# Patient Record
Sex: FEMALE | Race: WHITE | NOT HISPANIC OR LATINO
[De-identification: names, ages, dates, MRNs, and addresses within clinical notes are randomized per-mention and may not be internally consistent; named-entity substitution may affect disease eponyms.]

---

## 2017-09-05 ENCOUNTER — APPOINTMENT (OUTPATIENT)
Dept: MRI IMAGING | Facility: CLINIC | Age: 47
End: 2017-09-05
Payer: COMMERCIAL

## 2017-09-05 ENCOUNTER — OUTPATIENT (OUTPATIENT)
Dept: OUTPATIENT SERVICES | Facility: HOSPITAL | Age: 47
LOS: 1 days | End: 2017-09-05
Payer: COMMERCIAL

## 2017-09-05 ENCOUNTER — APPOINTMENT (OUTPATIENT)
Dept: RADIOLOGY | Facility: CLINIC | Age: 47
End: 2017-09-05
Payer: COMMERCIAL

## 2017-09-05 DIAGNOSIS — Z00.8 ENCOUNTER FOR OTHER GENERAL EXAMINATION: ICD-10-CM

## 2017-09-05 PROCEDURE — 27093 INJECTION FOR HIP X-RAY: CPT | Mod: LT

## 2017-09-05 PROCEDURE — 77002 NEEDLE LOCALIZATION BY XRAY: CPT | Mod: 26

## 2017-09-05 PROCEDURE — 73722 MRI JOINT OF LWR EXTR W/DYE: CPT | Mod: 26,LT

## 2017-09-05 PROCEDURE — 73722 MRI JOINT OF LWR EXTR W/DYE: CPT

## 2017-09-05 PROCEDURE — 27093 INJECTION FOR HIP X-RAY: CPT

## 2017-09-05 PROCEDURE — 77002 NEEDLE LOCALIZATION BY XRAY: CPT

## 2017-09-11 DIAGNOSIS — M76.02 GLUTEAL TENDINITIS, LEFT HIP: ICD-10-CM

## 2017-09-11 DIAGNOSIS — M25.852 OTHER SPECIFIED JOINT DISORDERS, LEFT HIP: ICD-10-CM

## 2017-09-11 DIAGNOSIS — M25.552 PAIN IN LEFT HIP: ICD-10-CM

## 2018-01-11 ENCOUNTER — APPOINTMENT (OUTPATIENT)
Dept: PULMONOLOGY | Facility: CLINIC | Age: 48
End: 2018-01-11
Payer: COMMERCIAL

## 2018-01-11 ENCOUNTER — LABORATORY RESULT (OUTPATIENT)
Age: 48
End: 2018-01-11

## 2018-01-11 VITALS
HEART RATE: 78 BPM | SYSTOLIC BLOOD PRESSURE: 124 MMHG | DIASTOLIC BLOOD PRESSURE: 80 MMHG | HEIGHT: 64 IN | BODY MASS INDEX: 25.27 KG/M2 | WEIGHT: 148 LBS | OXYGEN SATURATION: 98 %

## 2018-01-11 DIAGNOSIS — Z87.891 PERSONAL HISTORY OF NICOTINE DEPENDENCE: ICD-10-CM

## 2018-01-11 DIAGNOSIS — Z82.61 FAMILY HISTORY OF ARTHRITIS: ICD-10-CM

## 2018-01-11 DIAGNOSIS — Z82.49 FAMILY HISTORY OF ISCHEMIC HEART DISEASE AND OTHER DISEASES OF THE CIRCULATORY SYSTEM: ICD-10-CM

## 2018-01-11 DIAGNOSIS — Z86.39 PERSONAL HISTORY OF OTHER ENDOCRINE, NUTRITIONAL AND METABOLIC DISEASE: ICD-10-CM

## 2018-01-11 DIAGNOSIS — Z78.9 OTHER SPECIFIED HEALTH STATUS: ICD-10-CM

## 2018-01-11 DIAGNOSIS — Z87.01 PERSONAL HISTORY OF PNEUMONIA (RECURRENT): ICD-10-CM

## 2018-01-11 PROCEDURE — 71046 X-RAY EXAM CHEST 2 VIEWS: CPT

## 2018-01-11 PROCEDURE — 94729 DIFFUSING CAPACITY: CPT

## 2018-01-11 PROCEDURE — 94618 PULMONARY STRESS TESTING: CPT

## 2018-01-11 PROCEDURE — 99205 OFFICE O/P NEW HI 60 MIN: CPT | Mod: 25

## 2018-01-11 PROCEDURE — 94010 BREATHING CAPACITY TEST: CPT | Mod: 59

## 2018-01-11 RX ORDER — LINACLOTIDE 290 UG/1
290 CAPSULE, GELATIN COATED ORAL
Qty: 90 | Refills: 0 | Status: ACTIVE | COMMUNITY
Start: 2017-09-21

## 2018-01-11 RX ORDER — TEMAZEPAM 30 MG/1
30 CAPSULE ORAL
Qty: 30 | Refills: 0 | Status: ACTIVE | COMMUNITY
Start: 2017-11-08

## 2018-01-13 LAB
CLAM IGE QN: 0.14 KUA/L
CODFISH IGE QN: <0.1 KUA/L
CORN IGE QN: <0.1 KUA/L
COW MILK IGE QN: <0.1 KUA/L
DEPRECATED CLAM IGE RAST QL: NORMAL
DEPRECATED CODFISH IGE RAST QL: 0
DEPRECATED CORN IGE RAST QL: 0
DEPRECATED COW MILK IGE RAST QL: 0
DEPRECATED EGG WHITE IGE RAST QL: 0
DEPRECATED PEANUT IGE RAST QL: 0
DEPRECATED SCALLOP IGE RAST QL: 0.32 KUA/L
DEPRECATED SESAME SEED IGE RAST QL: 0
DEPRECATED SHRIMP IGE RAST QL: ABNORMAL
DEPRECATED SOYBEAN IGE RAST QL: 0
DEPRECATED WALNUT IGE RAST QL: 0
DEPRECATED WHEAT IGE RAST QL: 0
EGG WHITE IGE QN: <0.1 KUA/L
PEANUT IGE QN: <0.1 KUA/L
SCALLOP IGE QN: 1.78 KUA/L
SCALLOP IGE QN: NORMAL
SESAME SEED IGE QN: <0.1 KUA/L
SOYBEAN IGE QN: <0.1 KUA/L
WALNUT IGE QN: <0.1 KUA/L
WHEAT IGE QN: <0.1 KUA/L

## 2018-01-16 ENCOUNTER — FORM ENCOUNTER (OUTPATIENT)
Age: 48
End: 2018-01-16

## 2018-01-16 LAB
A ALTERNATA IGE QN: <0.1 KUA/L
A FUMIGATUS IGE QN: <0.1 KUA/L
C ALBICANS IGE QN: <0.1 KUA/L
C HERBARUM IGE QN: <0.1 KUA/L
CAT DANDER IGE QN: <0.1 KUA/L
CHLAMYDIA PNEUMONIAE AB IGA: NORMAL TITER
CHLAMYDIA PNEUMONIAE AB IGG: NORMAL TITER
CHLAMYDIA PNEUMONIAE AB IGM: NORMAL TITER
COMMON RAGWEED IGE QN: <0.1 KUA/L
D FARINAE IGE QN: 1.56 KUA/L
D PTERONYSS IGE QN: 1.87 KUA/L
DEPRECATED A ALTERNATA IGE RAST QL: 0
DEPRECATED A FUMIGATUS IGE RAST QL: 0
DEPRECATED C ALBICANS IGE RAST QL: 0
DEPRECATED C HERBARUM IGE RAST QL: 0
DEPRECATED CAT DANDER IGE RAST QL: 0
DEPRECATED COMMON RAGWEED IGE RAST QL: 0
DEPRECATED D FARINAE IGE RAST QL: ABNORMAL
DEPRECATED D PTERONYSS IGE RAST QL: ABNORMAL
DEPRECATED DOG DANDER IGE RAST QL: 0
DEPRECATED M RACEMOSUS IGE RAST QL: 0
DEPRECATED ROACH IGE RAST QL: ABNORMAL
DEPRECATED TIMOTHY IGE RAST QL: 0
DEPRECATED WHITE OAK IGE RAST QL: 0
DOG DANDER IGE QN: <0.1 KUA/L
M RACEMOSUS IGE QN: <0.1 KUA/L
ROACH IGE QN: 3.6 KUA/L
TIMOTHY IGE QN: <0.1 KUA/L
WHITE OAK IGE QN: <0.1 KUA/L

## 2018-01-17 ENCOUNTER — OUTPATIENT (OUTPATIENT)
Dept: OUTPATIENT SERVICES | Facility: HOSPITAL | Age: 48
LOS: 1 days | End: 2018-01-17
Payer: COMMERCIAL

## 2018-01-17 ENCOUNTER — APPOINTMENT (OUTPATIENT)
Dept: CT IMAGING | Facility: IMAGING CENTER | Age: 48
End: 2018-01-17

## 2018-01-17 DIAGNOSIS — J39.8 OTHER SPECIFIED DISEASES OF UPPER RESPIRATORY TRACT: ICD-10-CM

## 2018-01-17 PROCEDURE — 71250 CT THORAX DX C-: CPT | Mod: 26

## 2018-01-17 PROCEDURE — 71250 CT THORAX DX C-: CPT

## 2018-01-30 PROBLEM — J06.9 ACUTE URI: Status: RESOLVED | Noted: 2018-01-23 | Resolved: 2018-01-30

## 2018-01-30 RX ORDER — AZITHROMYCIN 500 MG/1
500 TABLET, FILM COATED ORAL DAILY
Qty: 7 | Refills: 0 | Status: DISCONTINUED | COMMUNITY
Start: 2018-01-17 | End: 2018-01-30

## 2018-01-30 RX ORDER — OXYCODONE AND ACETAMINOPHEN 5; 325 MG/1; MG/1
5-325 TABLET ORAL
Qty: 30 | Refills: 0 | Status: DISCONTINUED | COMMUNITY
Start: 2017-10-16 | End: 2018-01-30

## 2018-01-30 RX ORDER — AZITHROMYCIN 250 MG/1
250 TABLET, FILM COATED ORAL
Qty: 6 | Refills: 0 | Status: DISCONTINUED | COMMUNITY
Start: 2017-12-27 | End: 2018-01-30

## 2018-01-30 RX ORDER — DOXYCYCLINE 100 MG/1
100 CAPSULE ORAL
Qty: 20 | Refills: 0 | Status: DISCONTINUED | COMMUNITY
Start: 2017-11-16 | End: 2018-01-30

## 2018-02-01 ENCOUNTER — APPOINTMENT (OUTPATIENT)
Dept: THORACIC SURGERY | Facility: CLINIC | Age: 48
End: 2018-02-01
Payer: COMMERCIAL

## 2018-02-01 VITALS
RESPIRATION RATE: 18 BRPM | TEMPERATURE: 96.9 F | DIASTOLIC BLOOD PRESSURE: 70 MMHG | OXYGEN SATURATION: 97 % | SYSTOLIC BLOOD PRESSURE: 120 MMHG | HEART RATE: 77 BPM

## 2018-02-01 DIAGNOSIS — J06.9 ACUTE UPPER RESPIRATORY INFECTION, UNSPECIFIED: ICD-10-CM

## 2018-02-01 PROCEDURE — 99205 OFFICE O/P NEW HI 60 MIN: CPT

## 2018-02-01 RX ORDER — OMEPRAZOLE 40 MG/1
40 CAPSULE, DELAYED RELEASE ORAL
Qty: 30 | Refills: 0 | Status: COMPLETED | COMMUNITY
Start: 2017-10-16 | End: 2018-02-01

## 2018-02-01 RX ORDER — CELECOXIB 200 MG/1
200 CAPSULE ORAL
Qty: 30 | Refills: 0 | Status: COMPLETED | COMMUNITY
Start: 2017-10-16 | End: 2018-02-01

## 2018-02-01 RX ORDER — GUAIFENESIN AND CODEINE PHOSPHATE 10; 100 MG/5ML; MG/5ML
100-10 SOLUTION ORAL
Qty: 180 | Refills: 0 | Status: COMPLETED | COMMUNITY
Start: 2017-11-16 | End: 2018-02-01

## 2018-02-01 RX ORDER — PREDNISONE 10 MG/1
10 TABLET ORAL
Qty: 1 | Refills: 0 | Status: COMPLETED | COMMUNITY
Start: 2018-01-23 | End: 2018-02-01

## 2018-02-01 RX ORDER — ALPRAZOLAM 0.5 MG/1
0.5 TABLET ORAL
Qty: 60 | Refills: 0 | Status: COMPLETED | COMMUNITY
Start: 2017-11-08 | End: 2018-02-01

## 2018-02-01 RX ORDER — PREDNISONE 20 MG/1
20 TABLET ORAL
Qty: 7 | Refills: 0 | Status: COMPLETED | COMMUNITY
Start: 2017-12-22 | End: 2018-02-01

## 2018-02-01 RX ORDER — BECLOMETHASONE DIPROPIONATE 80 UG/1
80 AEROSOL, METERED RESPIRATORY (INHALATION) TWICE DAILY
Qty: 3 | Refills: 1 | Status: COMPLETED | COMMUNITY
Start: 2018-01-11 | End: 2018-02-01

## 2018-02-01 RX ORDER — DOXYCYCLINE 100 MG/1
100 TABLET, FILM COATED ORAL
Qty: 1 | Refills: 0 | Status: COMPLETED | COMMUNITY
Start: 2018-01-23 | End: 2018-02-01

## 2018-02-01 RX ORDER — VALACYCLOVIR 1 G/1
1 TABLET, FILM COATED ORAL
Qty: 21 | Refills: 0 | Status: COMPLETED | COMMUNITY
Start: 2017-07-21 | End: 2018-02-01

## 2018-02-01 RX ORDER — FLUTICASONE PROPIONATE 50 UG/1
50 SPRAY, METERED NASAL
Qty: 16 | Refills: 0 | Status: COMPLETED | COMMUNITY
Start: 2017-11-16 | End: 2018-02-01

## 2018-02-01 RX ORDER — TRAMADOL HYDROCHLORIDE 50 MG/1
50 TABLET, COATED ORAL
Qty: 40 | Refills: 0 | Status: COMPLETED | COMMUNITY
Start: 2017-07-27 | End: 2018-02-01

## 2018-02-06 VITALS
OXYGEN SATURATION: 100 % | WEIGHT: 179.24 LBS | DIASTOLIC BLOOD PRESSURE: 66 MMHG | SYSTOLIC BLOOD PRESSURE: 117 MMHG | HEIGHT: 64 IN | HEART RATE: 70 BPM | RESPIRATION RATE: 16 BRPM | TEMPERATURE: 97 F

## 2018-02-06 NOTE — ASU PATIENT PROFILE, ADULT - PSH
H/O arthroscopy of right knee    H/O bilateral breast reduction surgery    History of abdominoplasty    History of carpal tunnel release  left  History of cataract surgery  bilateral  History of parotidectomy  left  Status post arthroscopy of hip  left H/O arthroscopy of right knee    H/O bilateral breast reduction surgery    H/O tubal ligation    History of abdominoplasty    History of bladder surgery  sling  History of carpal tunnel release  left  History of cataract surgery  bilateral  History of lipoma  removed from left shoulder  History of parotidectomy  left  Status post arthroscopy of hip  left

## 2018-02-07 ENCOUNTER — APPOINTMENT (OUTPATIENT)
Dept: THORACIC SURGERY | Facility: HOSPITAL | Age: 48
End: 2018-02-07

## 2018-02-07 ENCOUNTER — OUTPATIENT (OUTPATIENT)
Dept: OUTPATIENT SERVICES | Facility: HOSPITAL | Age: 48
LOS: 1 days | Discharge: HOME | End: 2018-02-07
Payer: COMMERCIAL

## 2018-02-07 VITALS
OXYGEN SATURATION: 98 % | RESPIRATION RATE: 26 BRPM | DIASTOLIC BLOOD PRESSURE: 61 MMHG | HEART RATE: 72 BPM | TEMPERATURE: 99 F | SYSTOLIC BLOOD PRESSURE: 107 MMHG

## 2018-02-07 DIAGNOSIS — Z86.018 PERSONAL HISTORY OF OTHER BENIGN NEOPLASM: Chronic | ICD-10-CM

## 2018-02-07 DIAGNOSIS — Z98.890 OTHER SPECIFIED POSTPROCEDURAL STATES: Chronic | ICD-10-CM

## 2018-02-07 DIAGNOSIS — Z98.49 CATARACT EXTRACTION STATUS, UNSPECIFIED EYE: Chronic | ICD-10-CM

## 2018-02-07 DIAGNOSIS — Z98.51 TUBAL LIGATION STATUS: Chronic | ICD-10-CM

## 2018-02-07 PROCEDURE — 86901 BLOOD TYPING SEROLOGIC RH(D): CPT

## 2018-02-07 PROCEDURE — 86850 RBC ANTIBODY SCREEN: CPT

## 2018-02-07 PROCEDURE — 31622 DX BRONCHOSCOPE/WASH: CPT

## 2018-02-07 PROCEDURE — 86900 BLOOD TYPING SEROLOGIC ABO: CPT

## 2018-02-07 RX ORDER — SODIUM CHLORIDE 9 MG/ML
300 INJECTION INTRAMUSCULAR; INTRAVENOUS; SUBCUTANEOUS ONCE
Qty: 0 | Refills: 0 | Status: DISCONTINUED | OUTPATIENT
Start: 2018-02-07 | End: 2018-02-07

## 2018-02-17 PROBLEM — K58.9 IRRITABLE BOWEL SYNDROME, UNSPECIFIED: Chronic | Status: ACTIVE | Noted: 2018-02-06

## 2018-02-17 PROBLEM — E03.9 HYPOTHYROIDISM, UNSPECIFIED: Chronic | Status: ACTIVE | Noted: 2018-02-06

## 2018-02-17 PROBLEM — K58.9 IRRITABLE BOWEL SYNDROME WITHOUT DIARRHEA: Chronic | Status: ACTIVE | Noted: 2018-02-06

## 2018-02-17 PROBLEM — F41.9 ANXIETY DISORDER, UNSPECIFIED: Chronic | Status: ACTIVE | Noted: 2018-02-06

## 2018-02-21 ENCOUNTER — APPOINTMENT (OUTPATIENT)
Dept: PULMONOLOGY | Facility: CLINIC | Age: 48
End: 2018-02-21

## 2018-03-01 ENCOUNTER — LABORATORY RESULT (OUTPATIENT)
Age: 48
End: 2018-03-01

## 2018-03-01 ENCOUNTER — APPOINTMENT (OUTPATIENT)
Dept: THORACIC SURGERY | Facility: CLINIC | Age: 48
End: 2018-03-01
Payer: COMMERCIAL

## 2018-03-01 VITALS
SYSTOLIC BLOOD PRESSURE: 142 MMHG | TEMPERATURE: 97.7 F | HEART RATE: 71 BPM | RESPIRATION RATE: 18 BRPM | OXYGEN SATURATION: 98 % | DIASTOLIC BLOOD PRESSURE: 68 MMHG

## 2018-03-01 PROCEDURE — 99214 OFFICE O/P EST MOD 30 MIN: CPT

## 2018-03-01 RX ORDER — ACLIDINIUM BROMIDE 400 UG/1
400 POWDER, METERED RESPIRATORY (INHALATION) TWICE DAILY
Qty: 3 | Refills: 1 | Status: COMPLETED | COMMUNITY
Start: 2018-01-17 | End: 2018-03-01

## 2018-03-01 RX ORDER — DESLORATADINE 5 MG/1
5 TABLET, FILM COATED ORAL
Qty: 1 | Refills: 1 | Status: COMPLETED | COMMUNITY
Start: 2018-01-17 | End: 2018-03-01

## 2018-03-01 RX ORDER — GLYCOPYRROLATE AND FORMOTEROL FUMARATE 9; 4.8 UG/1; UG/1
9-4.8 AEROSOL, METERED RESPIRATORY (INHALATION)
Qty: 3 | Refills: 1 | Status: COMPLETED | COMMUNITY
Start: 2018-01-11 | End: 2018-03-01

## 2018-03-01 RX ORDER — IBUPROFEN AND FAMOTIDINE 800; 26.6 MG/1; MG/1
800-26.6 TABLET, COATED ORAL
Qty: 90 | Refills: 0 | Status: COMPLETED | COMMUNITY
Start: 2017-11-06 | End: 2018-03-01

## 2018-03-01 RX ORDER — AMITRIPTYLINE HYDROCHLORIDE 10 MG/1
10 TABLET, FILM COATED ORAL
Qty: 90 | Refills: 1 | Status: COMPLETED | COMMUNITY
Start: 2018-01-17 | End: 2018-03-01

## 2018-03-01 RX ORDER — INHALER, ASSIST DEVICES
SPACER (EA) MISCELLANEOUS
Qty: 1 | Refills: 2 | Status: COMPLETED | COMMUNITY
Start: 2018-01-11 | End: 2018-03-01

## 2018-03-01 RX ORDER — AZELASTINE HYDROCHLORIDE 137 UG/1
0.1 SPRAY, METERED NASAL TWICE DAILY
Qty: 1 | Refills: 0 | Status: COMPLETED | COMMUNITY
Start: 2018-01-23 | End: 2018-03-01

## 2018-03-01 RX ORDER — AMITRIPTYLINE HYDROCHLORIDE 10 MG/1
10 TABLET, FILM COATED ORAL
Qty: 120 | Refills: 3 | Status: COMPLETED | COMMUNITY
Start: 2018-02-07 | End: 2018-03-01

## 2018-03-01 RX ORDER — ERGOCALCIFEROL 1.25 MG/1
1.25 MG CAPSULE, LIQUID FILLED ORAL
Qty: 6 | Refills: 2 | Status: COMPLETED | COMMUNITY
Start: 2018-01-11 | End: 2018-03-01

## 2018-03-09 RX ORDER — FLUTICASONE PROPIONATE 220 MCG
1 AEROSOL WITH ADAPTER (GRAM) INHALATION
Qty: 0 | Refills: 0 | COMMUNITY

## 2018-03-09 NOTE — PATIENT PROFILE ADULT. - PSH
H/O arthroscopy of right knee    H/O bilateral breast reduction surgery    H/O tubal ligation    History of abdominoplasty    History of bladder surgery  sling  History of carpal tunnel release  left  History of cataract surgery  bilateral  History of lipoma  removed from left shoulder  History of parotidectomy  left  Status post arthroscopy of hip  left

## 2018-03-12 ENCOUNTER — APPOINTMENT (OUTPATIENT)
Dept: THORACIC SURGERY | Facility: HOSPITAL | Age: 48
End: 2018-03-12
Payer: COMMERCIAL

## 2018-03-12 ENCOUNTER — RESULT REVIEW (OUTPATIENT)
Age: 48
End: 2018-03-12

## 2018-03-12 ENCOUNTER — INPATIENT (INPATIENT)
Facility: HOSPITAL | Age: 48
LOS: 2 days | Discharge: HOME HEALTH | DRG: 165 | End: 2018-03-15
Attending: SPECIALIST | Admitting: SPECIALIST
Payer: COMMERCIAL

## 2018-03-12 VITALS — RESPIRATION RATE: 15 BRPM | HEART RATE: 92 BPM | OXYGEN SATURATION: 100 % | TEMPERATURE: 98 F

## 2018-03-12 DIAGNOSIS — Z86.018 PERSONAL HISTORY OF OTHER BENIGN NEOPLASM: Chronic | ICD-10-CM

## 2018-03-12 DIAGNOSIS — Z98.890 OTHER SPECIFIED POSTPROCEDURAL STATES: Chronic | ICD-10-CM

## 2018-03-12 DIAGNOSIS — Z98.49 CATARACT EXTRACTION STATUS, UNSPECIFIED EYE: Chronic | ICD-10-CM

## 2018-03-12 DIAGNOSIS — Z98.51 TUBAL LIGATION STATUS: Chronic | ICD-10-CM

## 2018-03-12 LAB
ANION GAP SERPL CALC-SCNC: 14 MMOL/L — SIGNIFICANT CHANGE UP (ref 5–17)
APTT BLD: 25.3 SEC — LOW (ref 27.5–37.4)
APTT BLD: 28.6 SEC — SIGNIFICANT CHANGE UP (ref 27.5–37.4)
BLD GP AB SCN SERPL QL: NEGATIVE — SIGNIFICANT CHANGE UP
BUN SERPL-MCNC: 16 MG/DL — SIGNIFICANT CHANGE UP (ref 7–23)
CALCIUM SERPL-MCNC: 8.7 MG/DL — SIGNIFICANT CHANGE UP (ref 8.4–10.5)
CHLORIDE SERPL-SCNC: 99 MMOL/L — SIGNIFICANT CHANGE UP (ref 96–108)
CO2 SERPL-SCNC: 22 MMOL/L — SIGNIFICANT CHANGE UP (ref 22–31)
CREAT SERPL-MCNC: 0.74 MG/DL — SIGNIFICANT CHANGE UP (ref 0.5–1.3)
GAS PNL BLDA: SIGNIFICANT CHANGE UP
GAS PNL BLDA: SIGNIFICANT CHANGE UP
GLUCOSE SERPL-MCNC: 150 MG/DL — HIGH (ref 70–99)
HCT VFR BLD CALC: 35.3 % — SIGNIFICANT CHANGE UP (ref 34.5–45)
HCT VFR BLD CALC: 41 % — SIGNIFICANT CHANGE UP (ref 34.5–45)
HGB BLD-MCNC: 12 G/DL — SIGNIFICANT CHANGE UP (ref 11.5–15.5)
HGB BLD-MCNC: 14 G/DL — SIGNIFICANT CHANGE UP (ref 11.5–15.5)
INR BLD: 1.01 — SIGNIFICANT CHANGE UP (ref 0.88–1.16)
INR BLD: 1.08 — SIGNIFICANT CHANGE UP (ref 0.88–1.16)
LACTATE SERPL-SCNC: 1 MMOL/L — SIGNIFICANT CHANGE UP (ref 0.5–2)
LYMPHOCYTES # BLD AUTO: 5 % — LOW (ref 13–44)
MAGNESIUM SERPL-MCNC: 2.1 MG/DL — SIGNIFICANT CHANGE UP (ref 1.6–2.6)
MCHC RBC-ENTMCNC: 29.6 PG — SIGNIFICANT CHANGE UP (ref 27–34)
MCHC RBC-ENTMCNC: 30.4 PG — SIGNIFICANT CHANGE UP (ref 27–34)
MCHC RBC-ENTMCNC: 34 G/DL — SIGNIFICANT CHANGE UP (ref 32–36)
MCHC RBC-ENTMCNC: 34.1 G/DL — SIGNIFICANT CHANGE UP (ref 32–36)
MCV RBC AUTO: 87.2 FL — SIGNIFICANT CHANGE UP (ref 80–100)
MCV RBC AUTO: 88.9 FL — SIGNIFICANT CHANGE UP (ref 80–100)
NEUTROPHILS NFR BLD AUTO: 95 % — HIGH (ref 43–77)
PHOSPHATE SERPL-MCNC: 4.2 MG/DL — SIGNIFICANT CHANGE UP (ref 2.5–4.5)
PLATELET # BLD AUTO: 258 K/UL — SIGNIFICANT CHANGE UP (ref 150–400)
PLATELET # BLD AUTO: 263 K/UL — SIGNIFICANT CHANGE UP (ref 150–400)
POTASSIUM SERPL-MCNC: 4.7 MMOL/L — SIGNIFICANT CHANGE UP (ref 3.5–5.3)
POTASSIUM SERPL-SCNC: 4.7 MMOL/L — SIGNIFICANT CHANGE UP (ref 3.5–5.3)
PROTHROM AB SERPL-ACNC: 11.2 SEC — SIGNIFICANT CHANGE UP (ref 9.8–12.7)
PROTHROM AB SERPL-ACNC: 12 SEC — SIGNIFICANT CHANGE UP (ref 9.8–12.7)
RBC # BLD: 4.05 M/UL — SIGNIFICANT CHANGE UP (ref 3.8–5.2)
RBC # BLD: 4.61 M/UL — SIGNIFICANT CHANGE UP (ref 3.8–5.2)
RBC # FLD: 12.6 % — SIGNIFICANT CHANGE UP (ref 10.3–16.9)
RBC # FLD: 13.3 % — SIGNIFICANT CHANGE UP (ref 10.3–16.9)
RH IG SCN BLD-IMP: POSITIVE — SIGNIFICANT CHANGE UP
SODIUM SERPL-SCNC: 135 MMOL/L — SIGNIFICANT CHANGE UP (ref 135–145)
WBC # BLD: 14.7 K/UL — HIGH (ref 3.8–10.5)
WBC # BLD: 20.3 K/UL — HIGH (ref 3.8–10.5)
WBC # FLD AUTO: 14.7 K/UL — HIGH (ref 3.8–10.5)
WBC # FLD AUTO: 20.3 K/UL — HIGH (ref 3.8–10.5)

## 2018-03-12 PROCEDURE — 31760 TRACHEOPLASTY INTRATHORACIC: CPT | Mod: 80

## 2018-03-12 PROCEDURE — 31770 REPAIR/GRAFT OF BRONCHUS: CPT

## 2018-03-12 PROCEDURE — 31622 DX BRONCHOSCOPE/WASH: CPT

## 2018-03-12 PROCEDURE — 71045 X-RAY EXAM CHEST 1 VIEW: CPT | Mod: 26,76

## 2018-03-12 PROCEDURE — S2900 ROBOTIC SURGICAL SYSTEM: CPT | Mod: NC

## 2018-03-12 PROCEDURE — 93010 ELECTROCARDIOGRAM REPORT: CPT

## 2018-03-12 PROCEDURE — 32674 THORACOSCOPY LYMPH NODE EXC: CPT

## 2018-03-12 PROCEDURE — 31770 REPAIR/GRAFT OF BRONCHUS: CPT | Mod: 80

## 2018-03-12 PROCEDURE — 32674 THORACOSCOPY LYMPH NODE EXC: CPT | Mod: 80

## 2018-03-12 PROCEDURE — 31760 TRACHEOPLASTY INTRATHORACIC: CPT

## 2018-03-12 RX ORDER — KETOROLAC TROMETHAMINE 30 MG/ML
15 SYRINGE (ML) INJECTION ONCE
Qty: 0 | Refills: 0 | Status: DISCONTINUED | OUTPATIENT
Start: 2018-03-12 | End: 2018-03-12

## 2018-03-12 RX ORDER — DEXTROSE 50 % IN WATER 50 %
25 SYRINGE (ML) INTRAVENOUS
Qty: 0 | Refills: 0 | Status: DISCONTINUED | OUTPATIENT
Start: 2018-03-12 | End: 2018-03-13

## 2018-03-12 RX ORDER — SODIUM CHLORIDE 9 MG/ML
1000 INJECTION, SOLUTION INTRAVENOUS
Qty: 0 | Refills: 0 | Status: DISCONTINUED | OUTPATIENT
Start: 2018-03-12 | End: 2018-03-13

## 2018-03-12 RX ORDER — FENTANYL CITRATE 50 UG/ML
25 INJECTION INTRAVENOUS
Qty: 0 | Refills: 0 | Status: DISCONTINUED | OUTPATIENT
Start: 2018-03-12 | End: 2018-03-13

## 2018-03-12 RX ORDER — FENTANYL CITRATE 50 UG/ML
12.5 INJECTION INTRAVENOUS ONCE
Qty: 0 | Refills: 0 | Status: DISCONTINUED | OUTPATIENT
Start: 2018-03-12 | End: 2018-03-12

## 2018-03-12 RX ORDER — MORPHINE SULFATE 50 MG/1
4 CAPSULE, EXTENDED RELEASE ORAL ONCE
Qty: 0 | Refills: 0 | Status: DISCONTINUED | OUTPATIENT
Start: 2018-03-12 | End: 2018-03-12

## 2018-03-12 RX ORDER — IPRATROPIUM BROMIDE 0.2 MG/ML
500 SOLUTION, NON-ORAL INHALATION ONCE
Qty: 0 | Refills: 0 | Status: COMPLETED | OUTPATIENT
Start: 2018-03-12 | End: 2018-03-13

## 2018-03-12 RX ORDER — ALBUMIN HUMAN 25 %
250 VIAL (ML) INTRAVENOUS ONCE
Qty: 0 | Refills: 0 | Status: COMPLETED | OUTPATIENT
Start: 2018-03-12 | End: 2018-03-13

## 2018-03-12 RX ORDER — INSULIN HUMAN 100 [IU]/ML
2 INJECTION, SOLUTION SUBCUTANEOUS
Qty: 50 | Refills: 0 | Status: DISCONTINUED | OUTPATIENT
Start: 2018-03-12 | End: 2018-03-13

## 2018-03-12 RX ORDER — HEPARIN SODIUM 5000 [USP'U]/ML
5000 INJECTION INTRAVENOUS; SUBCUTANEOUS EVERY 8 HOURS
Qty: 0 | Refills: 0 | Status: DISCONTINUED | OUTPATIENT
Start: 2018-03-12 | End: 2018-03-15

## 2018-03-12 RX ORDER — DEXMEDETOMIDINE HYDROCHLORIDE IN 0.9% SODIUM CHLORIDE 4 UG/ML
0.3 INJECTION INTRAVENOUS
Qty: 200 | Refills: 0 | Status: DISCONTINUED | OUTPATIENT
Start: 2018-03-12 | End: 2018-03-13

## 2018-03-12 RX ORDER — LEVOTHYROXINE SODIUM 125 MCG
12.5 TABLET ORAL AT BEDTIME
Qty: 0 | Refills: 0 | Status: DISCONTINUED | OUTPATIENT
Start: 2018-03-12 | End: 2018-03-12

## 2018-03-12 RX ORDER — BUPIVACAINE 13.3 MG/ML
20 INJECTION, SUSPENSION, LIPOSOMAL INFILTRATION ONCE
Qty: 0 | Refills: 0 | Status: DISCONTINUED | OUTPATIENT
Start: 2018-03-12 | End: 2018-03-13

## 2018-03-12 RX ORDER — HYDROMORPHONE HYDROCHLORIDE 2 MG/ML
0.5 INJECTION INTRAMUSCULAR; INTRAVENOUS; SUBCUTANEOUS ONCE
Qty: 0 | Refills: 0 | Status: DISCONTINUED | OUTPATIENT
Start: 2018-03-12 | End: 2018-03-12

## 2018-03-12 RX ORDER — LEVOTHYROXINE SODIUM 125 MCG
12.5 TABLET ORAL AT BEDTIME
Qty: 0 | Refills: 0 | Status: DISCONTINUED | OUTPATIENT
Start: 2018-03-12 | End: 2018-03-13

## 2018-03-12 RX ORDER — DEXTROSE 50 % IN WATER 50 %
50 SYRINGE (ML) INTRAVENOUS
Qty: 0 | Refills: 0 | Status: DISCONTINUED | OUTPATIENT
Start: 2018-03-12 | End: 2018-03-13

## 2018-03-12 RX ORDER — VANCOMYCIN HCL 1 G
1000 VIAL (EA) INTRAVENOUS EVERY 12 HOURS
Qty: 0 | Refills: 0 | Status: COMPLETED | OUTPATIENT
Start: 2018-03-12 | End: 2018-03-13

## 2018-03-12 RX ORDER — PANTOPRAZOLE SODIUM 20 MG/1
40 TABLET, DELAYED RELEASE ORAL DAILY
Qty: 0 | Refills: 0 | Status: DISCONTINUED | OUTPATIENT
Start: 2018-03-12 | End: 2018-03-13

## 2018-03-12 RX ADMIN — Medication 1200 MILLIGRAM(S): at 22:36

## 2018-03-12 RX ADMIN — HYDROMORPHONE HYDROCHLORIDE 0.5 MILLIGRAM(S): 2 INJECTION INTRAMUSCULAR; INTRAVENOUS; SUBCUTANEOUS at 20:15

## 2018-03-12 RX ADMIN — FENTANYL CITRATE 25 MICROGRAM(S): 50 INJECTION INTRAVENOUS at 15:35

## 2018-03-12 RX ADMIN — HEPARIN SODIUM 5000 UNIT(S): 5000 INJECTION INTRAVENOUS; SUBCUTANEOUS at 22:45

## 2018-03-12 RX ADMIN — Medication 15 MILLIGRAM(S): at 17:09

## 2018-03-12 RX ADMIN — Medication 15 MILLIGRAM(S): at 17:30

## 2018-03-12 RX ADMIN — HYDROMORPHONE HYDROCHLORIDE 0.5 MILLIGRAM(S): 2 INJECTION INTRAMUSCULAR; INTRAVENOUS; SUBCUTANEOUS at 19:45

## 2018-03-12 RX ADMIN — FENTANYL CITRATE 25 MICROGRAM(S): 50 INJECTION INTRAVENOUS at 15:05

## 2018-03-12 RX ADMIN — HEPARIN SODIUM 5000 UNIT(S): 5000 INJECTION INTRAVENOUS; SUBCUTANEOUS at 16:09

## 2018-03-12 RX ADMIN — Medication 250 MILLIGRAM(S): at 22:36

## 2018-03-12 RX ADMIN — FENTANYL CITRATE 12.5 MICROGRAM(S): 50 INJECTION INTRAVENOUS at 14:15

## 2018-03-12 RX ADMIN — FENTANYL CITRATE 12.5 MICROGRAM(S): 50 INJECTION INTRAVENOUS at 14:00

## 2018-03-12 NOTE — PROGRESS NOTE ADULT - ASSESSMENT
Pt is a 46 yo F with h/o pertussis, cough, SOB worked up with the following  1) PFT showed FEV1= 2.48, 93 % of predicted value, FVC =2.76, 86% of predicted value, PEF= 5.33, 89% of predicted value and DLCO -23.3, 108 % of predicted value.   2) CT chest on 1/17/18 revealed: 70% narrowing of trachea on the expiratory phase, which is consistent with tracheomalacia, as well as a 3 mm RLL nodule noted.   3) Bronchoscopy 2/7/18 demonstrated that there was a 75% distral tracheal collapse on forced expiration, 90 % collapse of the left main bronchus, and 75% collapse of the right main bronchus with force exhalation. There was no significant collapse of the trachea with quiet breathing.   Pt dx'd with tracheomalacia and today is s/p R VATS, robotic assisted tracheobronchoblasty with prolene mesh and placement of R CT. EBL 50ml Intra-op fluids 700ml Crystalloids UO 200ml    Resp: R CT removed/ Supplemental O2 prn  ID: Vanco prophylaxis  CVS:  HEME: Heparin sq  FEN: NPO  Endo: Adjust Insulin drip to Glu/ Cont Synthroid  Renal: Follow BUN/Cr and UO  Pain management prn/ OOB->chair    CCT: 40 min

## 2018-03-12 NOTE — H&P ADULT - HISTORY OF PRESENT ILLNESS
47 year old female with PMH of pertussis, cough, SOB, originally referred by Dr. Jack Summers, presents for surgical correction of tracheomalacia.     Patient chief complaint is a dry non productive cough and repeated pulmonary infections which started approximately 2 years ago. She has been evaluated by an ENT for post nasal drip and GERD, and she was told she had neither. She was diagnosed with pertussis 1 year ago, which was treated with antibiotics. She feels her cough has progressively worsened in the past few months and she is now experiencing SOB on exertion.     PFT showed FEV1= 2.48, 93 % of predicted value, FVC =2.76, 86% of predicted value, PEF= 5.33, 89% of predicted value and DLCO -23.3, 108 % of predicted value.     CT chest on 1/17/18 revealed: 70% narrowing of trachea on the expiratory phase, which is consistent with tracheomalacia, as well as a 3 mm RLL nodule noted.     Bronchoscopy 2/7/18 demonstrated that there was a 75% distral tracheal collapse on forced expiration, 90 % collapse of the left main bronchus, and 75% collapse of 47 year old female with PMH of pertussis, cough, SOB, originally referred by Dr. Jack Summers, presents for surgical correction of tracheomalacia.     Patient chief complaint is a dry non productive cough and repeated pulmonary infections which started approximately 2 years ago. She has been evaluated by an ENT for post nasal drip and GERD, and she was told she had neither. She was diagnosed with pertussis 1 year ago, which was treated with antibiotics. She feels her cough has progressively worsened in the past few months and she is now experiencing SOB on exertion.     PFT showed FEV1= 2.48, 93 % of predicted value, FVC =2.76, 86% of predicted value, PEF= 5.33, 89% of predicted value and DLCO -23.3, 108 % of predicted value.     CT chest on 1/17/18 revealed: 70% narrowing of trachea on the expiratory phase, which is consistent with tracheomalacia, as well as a 3 mm RLL nodule noted.     Bronchoscopy 2/7/18 demonstrated that there was a 75% distral tracheal collapse on forced expiration, 90 % collapse of the left main bronchus, and 75% collapse of the right main bronchus with force exhalation. There was no significant collapse of the trachea with quiet breathing. She was given a trial of Elavil for the cough and is on 30 mg nightly.     She presents today for surgical repair as her cough and shortness of breath are affecting her quality of life. She reports shortness of breath and cough on minimal exertion. Patient denies hemoptysis, nausea, vomiting, diarrhea, chest pain, fever, or chills.

## 2018-03-12 NOTE — BRIEF OPERATIVE NOTE - PROCEDURE
<<-----Click on this checkbox to enter Procedure VATS (video-assisted thoracoscopic surgery)  03/12/2018  right, robotic assisted, Tracheobronchoblasty with prolene mesh  Active  PMAINGON VATS (video-assisted thoracoscopic surgery)  03/12/2018  right, robotic assisted, Tracheobronchoblasty with prolene mesh  Active  Emma Rosario

## 2018-03-12 NOTE — H&P ADULT - NSHPPHYSICALEXAM_GEN_ALL_CORE
Eyes: the sclera and conjunctiva were normal and pupils were equal in size, round, and reactive to light  Neck: appearance of neck was normal and supple  Pulm: no resp distress, CTABL  Heart: RRR, no m/r/g  Abdomen: +BS, soft non tender  Vasc: Carotid, radials 2 + bilaterally  MS: normal gait  Skin: normal skin color, pigmentation, turgor. no rashes or lesions  Neurological: non focal, alert and oriented x 3

## 2018-03-12 NOTE — H&P ADULT - NSHPREVIEWOFSYSTEMS_GEN_ALL_CORE
Constitutional: as noted in HPI  Respiratory: as noted in HPI  Eyes, ENT, Cardiovascular, GI, , Musculoskeletal, Integumentary, Neurological, Psychiatric, Endocrine, and Heme/Lymph are otherwise negative.

## 2018-03-12 NOTE — PROGRESS NOTE ADULT - SUBJECTIVE AND OBJECTIVE BOX
HPI:  Pt is a 46 yo F with h/o pertussis, cough, SOB worked up with the following  1) PFT showed FEV1= 2.48, 93 % of predicted value, FVC =2.76, 86% of predicted value, PEF= 5.33, 89% of predicted value and DLCO -23.3, 108 % of predicted value.   2) CT chest on 1/17/18 revealed: 70% narrowing of trachea on the expiratory phase, which is consistent with tracheomalacia, as well as a 3 mm RLL nodule noted.   3) Bronchoscopy 2/7/18 demonstrated that there was a 75% distral tracheal collapse on forced expiration, 90 % collapse of the left main bronchus, and 75% collapse of the right main bronchus with force exhalation. There was no significant collapse of the trachea with quiet breathing.   Pt dx'd with tracheomalacia and today is s/p R VATS robotic assisted tracheobronchoblasty with prolene mesh and placement of R CT. EBL 50ml Intra-op fluids 700ml Crystalloids UO 200ml      24 hr events:      ## Labs:  CBC:                        14.0   20.3  )-----------( 258      ( 12 Mar 2018 14:11 )             41.0     Chem:  03-12    135  |  99  |  16  ----------------------------<  150<H>  4.7   |  22  |  0.74    Ca    8.7      12 Mar 2018 14:11  Phos  4.2     03-12  Mg     2.1     03-12      Coags:  PT/INR - ( 12 Mar 2018 14:11 )   PT: 11.2 sec;   INR: 1.01          PTT - ( 12 Mar 2018 14:11 )  PTT:25.3 sec        ## Imaging:    ## Medications:  vancomycin  IVPB 1000 milliGRAM(s) IV Intermittent every 12 hours        dextrose 50% Injectable 50 milliLiter(s) IV Push every 15 minutes  dextrose 50% Injectable 25 milliLiter(s) IV Push every 15 minutes  insulin Infusion 2 Unit(s)/Hr IV Continuous <Continuous>  levothyroxine Injectable 12.5 MICROGram(s) IV Push at bedtime    heparin  Injectable 5000 Unit(s) SubCutaneous every 8 hours    pantoprazole  Injectable 40 milliGRAM(s) IV Push daily    fentaNYL    Injectable 25 MICROGram(s) IV Push every 3 hours PRN      ## Vitals:  T(C): 36.7 (03-12-18 @ 17:14), Max: 36.7 (03-12-18 @ 17:14)  HR: 82 (03-12-18 @ 17:00) (82 - 92)  BP: --  BP(mean): --  RR: 20 (03-12-18 @ 17:00) (12 - 21)  SpO2: 98% (03-12-18 @ 17:00) (96% - 100%)  Wt(kg): --  Vent:   ABG: ABG - ( 12 Mar 2018 14:10 )  pH: 7.37  /  pCO2: 40    /  pO2: 102   / HCO3: 23    / Base Excess: -2.1  /  SaO2: 98                        ## P/E:  Gen: lying comfortably in bed in no apparent distress  Mouth:   Neck:  Lungs:   Heart:   Abd:  Ext:  Neuro:    CENTRAL LINE: [ ] YES [ ] NO  LOCATION:   DATE INSERTED:  REMOVE: [ ] YES [ ] NO      MELVIN: [ ] YES [ ] NO    DATE INSERTED:  REMOVE:  [ ] YES [ ] NO      A-LINE:  [ ] YES [ ] NO  LOCATION:   DATE INSERTED:  REMOVE:  [ ] YES [ ] NO  EXPLAIN:    GLOBAL ISSUE/BEST PRACTICE:  Analgesia:  Sedation:  HOB elevation: yes  Stress ulcer prophylaxis:  VTE prophylaxis:  Oral Care:  Glycemic control:  Nutrition:    CODE STATUS: [ ] full code  [ ] DNR  [ ] DNI  [ ] MOLST  Goals of care discussion: [ ] yes HPI:  Pt is a 46 yo F with h/o pertussis, cough, SOB worked up with the following  1) PFT showed FEV1= 2.48, 93 % of predicted value, FVC =2.76, 86% of predicted value, PEF= 5.33, 89% of predicted value and DLCO -23.3, 108 % of predicted value.   2) CT chest on 1/17/18 revealed: 70% narrowing of trachea on the expiratory phase, which is consistent with tracheomalacia, as well as a 3 mm RLL nodule noted.   3) Bronchoscopy 2/7/18 demonstrated that there was a 75% distral tracheal collapse on forced expiration, 90 % collapse of the left main bronchus, and 75% collapse of the right main bronchus with force exhalation. There was no significant collapse of the trachea with quiet breathing.   Pt dx'd with tracheomalacia and today is s/p R VATS, robotic assisted tracheobronchoblasty with prolene mesh and placement of R CT. EBL 50ml Intra-op fluids 700ml Crystalloids UO 200ml    ## Labs:  CBC:                        14.0   20.3  )-----------( 258      ( 12 Mar 2018 14:11 )             41.0     Chem:  03-12    135  |  99  |  16  ----------------------------<  150<H>  4.7   |  22  |  0.74    Ca    8.7      12 Mar 2018 14:11  Phos  4.2     03-12  Mg     2.1     03-12      Coags:  PT/INR - ( 12 Mar 2018 14:11 )   PT: 11.2 sec;   INR: 1.01        PTT - ( 12 Mar 2018 14:11 )  PTT:25.3 sec      ## Imaging:    ## Medications:  vancomycin  IVPB 1000 milliGRAM(s) IV Intermittent every 12 hours      dextrose 50% Injectable 50 milliLiter(s) IV Push every 15 minutes  dextrose 50% Injectable 25 milliLiter(s) IV Push every 15 minutes  insulin Infusion 2 Unit(s)/Hr IV Continuous <Continuous>  levothyroxine Injectable 12.5 MICROGram(s) IV Push at bedtime    heparin  Injectable 5000 Unit(s) SubCutaneous every 8 hours    pantoprazole  Injectable 40 milliGRAM(s) IV Push daily    fentaNYL    Injectable 25 MICROGram(s) IV Push every 3 hours PRN      ## Vitals:  T(C): 36.7 (03-12-18 @ 17:14), Max: 36.7 (03-12-18 @ 17:14)  HR: 82 (03-12-18 @ 17:00) (82 - 92)  BP: --  BP(mean): --  RR: 20 (03-12-18 @ 17:00) (12 - 21)  SpO2: 98% (03-12-18 @ 17:00) (96% - 100%)  Wt(kg): --  Vent:   ABG: ABG - ( 12 Mar 2018 14:10 )  pH: 7.37  /  pCO2: 40    /  pO2: 102   / HCO3: 23    / Base Excess: -2.1  /  SaO2: 98          ## P/E:  Gen: lying c/o pain  Lungs: CTA/ R CT  Heart: RRR  Abd: Soft/+BS  Ext: No edema  Neuro: Awake/alert    CENTRAL LINE: [ ] YES [ ] NO  LOCATION:   DATE INSERTED:  REMOVE: [ ] YES [ ] NO      MELVIN: [ ] YES [ ] NO    DATE INSERTED:  REMOVE:  [ ] YES [ ] NO      A-LINE:  [x ] YES [ ] NO  LOCATION:   DATE INSERTED:  REMOVE:  [ ] YES [ ] NO  EXPLAIN:    CODE STATUS: [x ] full code  [ ] DNR  [ ] DNI  [ ] [facility]  Goals of care discussion: [ ] yes

## 2018-03-12 NOTE — H&P ADULT - ASSESSMENT
47 year old female with PMH of pertussis, cough, SOB, originally referred by Dr. Jack Summers, presents for surgical correction of tracheomalacia.     -NPO since midnight  -Consent signed and in the chart  -Will send type and screen, blood on hold for the OR.

## 2018-03-13 LAB
ALBUMIN SERPL ELPH-MCNC: 3.5 G/DL — SIGNIFICANT CHANGE UP (ref 3.3–5)
ALP SERPL-CCNC: 41 U/L — SIGNIFICANT CHANGE UP (ref 40–120)
ALT FLD-CCNC: 19 U/L — SIGNIFICANT CHANGE UP (ref 10–45)
ANION GAP SERPL CALC-SCNC: 12 MMOL/L — SIGNIFICANT CHANGE UP (ref 5–17)
ANION GAP SERPL CALC-SCNC: 13 MMOL/L — SIGNIFICANT CHANGE UP (ref 5–17)
APTT BLD: 29.5 SEC — SIGNIFICANT CHANGE UP (ref 27.5–37.4)
AST SERPL-CCNC: 23 U/L — SIGNIFICANT CHANGE UP (ref 10–40)
BILIRUB SERPL-MCNC: 1.2 MG/DL — SIGNIFICANT CHANGE UP (ref 0.2–1.2)
BUN SERPL-MCNC: 17 MG/DL — SIGNIFICANT CHANGE UP (ref 7–23)
BUN SERPL-MCNC: 18 MG/DL — SIGNIFICANT CHANGE UP (ref 7–23)
CALCIUM SERPL-MCNC: 8.2 MG/DL — LOW (ref 8.4–10.5)
CALCIUM SERPL-MCNC: 8.3 MG/DL — LOW (ref 8.4–10.5)
CHLORIDE SERPL-SCNC: 100 MMOL/L — SIGNIFICANT CHANGE UP (ref 96–108)
CHLORIDE SERPL-SCNC: 98 MMOL/L — SIGNIFICANT CHANGE UP (ref 96–108)
CO2 SERPL-SCNC: 23 MMOL/L — SIGNIFICANT CHANGE UP (ref 22–31)
CO2 SERPL-SCNC: 25 MMOL/L — SIGNIFICANT CHANGE UP (ref 22–31)
CREAT SERPL-MCNC: 0.71 MG/DL — SIGNIFICANT CHANGE UP (ref 0.5–1.3)
CREAT SERPL-MCNC: 0.72 MG/DL — SIGNIFICANT CHANGE UP (ref 0.5–1.3)
GLUCOSE SERPL-MCNC: 106 MG/DL — HIGH (ref 70–99)
GLUCOSE SERPL-MCNC: 136 MG/DL — HIGH (ref 70–99)
HCT VFR BLD CALC: 35.5 % — SIGNIFICANT CHANGE UP (ref 34.5–45)
HGB BLD-MCNC: 11.7 G/DL — SIGNIFICANT CHANGE UP (ref 11.5–15.5)
INR BLD: 0.98 — SIGNIFICANT CHANGE UP (ref 0.88–1.16)
MAGNESIUM SERPL-MCNC: 2 MG/DL — SIGNIFICANT CHANGE UP (ref 1.6–2.6)
MCHC RBC-ENTMCNC: 29.4 PG — SIGNIFICANT CHANGE UP (ref 27–34)
MCHC RBC-ENTMCNC: 33 G/DL — SIGNIFICANT CHANGE UP (ref 32–36)
MCV RBC AUTO: 89.2 FL — SIGNIFICANT CHANGE UP (ref 80–100)
PHOSPHATE SERPL-MCNC: 4.1 MG/DL — SIGNIFICANT CHANGE UP (ref 2.5–4.5)
PLATELET # BLD AUTO: 239 K/UL — SIGNIFICANT CHANGE UP (ref 150–400)
POTASSIUM SERPL-MCNC: 3.8 MMOL/L — SIGNIFICANT CHANGE UP (ref 3.5–5.3)
POTASSIUM SERPL-MCNC: 4 MMOL/L — SIGNIFICANT CHANGE UP (ref 3.5–5.3)
POTASSIUM SERPL-SCNC: 3.8 MMOL/L — SIGNIFICANT CHANGE UP (ref 3.5–5.3)
POTASSIUM SERPL-SCNC: 4 MMOL/L — SIGNIFICANT CHANGE UP (ref 3.5–5.3)
PROT SERPL-MCNC: 6.4 G/DL — SIGNIFICANT CHANGE UP (ref 6–8.3)
PROTHROM AB SERPL-ACNC: 10.9 SEC — SIGNIFICANT CHANGE UP (ref 9.8–12.7)
RBC # BLD: 3.98 M/UL — SIGNIFICANT CHANGE UP (ref 3.8–5.2)
RBC # FLD: 13.1 % — SIGNIFICANT CHANGE UP (ref 10.3–16.9)
SODIUM SERPL-SCNC: 135 MMOL/L — SIGNIFICANT CHANGE UP (ref 135–145)
SODIUM SERPL-SCNC: 136 MMOL/L — SIGNIFICANT CHANGE UP (ref 135–145)
WBC # BLD: 11.2 K/UL — HIGH (ref 3.8–10.5)
WBC # FLD AUTO: 11.2 K/UL — HIGH (ref 3.8–10.5)

## 2018-03-13 PROCEDURE — 99292 CRITICAL CARE ADDL 30 MIN: CPT

## 2018-03-13 PROCEDURE — 71045 X-RAY EXAM CHEST 1 VIEW: CPT | Mod: 26,76

## 2018-03-13 PROCEDURE — 99291 CRITICAL CARE FIRST HOUR: CPT

## 2018-03-13 RX ORDER — FENTANYL CITRATE 50 UG/ML
30 INJECTION INTRAVENOUS
Qty: 0 | Refills: 0 | Status: DISCONTINUED | OUTPATIENT
Start: 2018-03-13 | End: 2018-03-13

## 2018-03-13 RX ORDER — NALOXONE HYDROCHLORIDE 4 MG/.1ML
0.1 SPRAY NASAL
Qty: 0 | Refills: 0 | Status: DISCONTINUED | OUTPATIENT
Start: 2018-03-13 | End: 2018-03-13

## 2018-03-13 RX ORDER — ONDANSETRON 8 MG/1
4 TABLET, FILM COATED ORAL EVERY 6 HOURS
Qty: 0 | Refills: 0 | Status: DISCONTINUED | OUTPATIENT
Start: 2018-03-13 | End: 2018-03-14

## 2018-03-13 RX ORDER — LEVOTHYROXINE SODIUM 125 MCG
25 TABLET ORAL DAILY
Qty: 0 | Refills: 0 | Status: DISCONTINUED | OUTPATIENT
Start: 2018-03-13 | End: 2018-03-13

## 2018-03-13 RX ORDER — HYDROMORPHONE HYDROCHLORIDE 2 MG/ML
30 INJECTION INTRAMUSCULAR; INTRAVENOUS; SUBCUTANEOUS
Qty: 0 | Refills: 0 | Status: DISCONTINUED | OUTPATIENT
Start: 2018-03-13 | End: 2018-03-13

## 2018-03-13 RX ORDER — HYDROMORPHONE HYDROCHLORIDE 2 MG/ML
0.5 INJECTION INTRAMUSCULAR; INTRAVENOUS; SUBCUTANEOUS ONCE
Qty: 0 | Refills: 0 | Status: DISCONTINUED | OUTPATIENT
Start: 2018-03-13 | End: 2018-03-13

## 2018-03-13 RX ORDER — ALPRAZOLAM 0.25 MG
0.5 TABLET ORAL THREE TIMES A DAY
Qty: 0 | Refills: 0 | Status: DISCONTINUED | OUTPATIENT
Start: 2018-03-13 | End: 2018-03-15

## 2018-03-13 RX ORDER — SODIUM CHLORIDE 9 MG/ML
3 INJECTION INTRAMUSCULAR; INTRAVENOUS; SUBCUTANEOUS EVERY 8 HOURS
Qty: 0 | Refills: 0 | Status: DISCONTINUED | OUTPATIENT
Start: 2018-03-13 | End: 2018-03-14

## 2018-03-13 RX ORDER — LEVOTHYROXINE SODIUM 125 MCG
25 TABLET ORAL DAILY
Qty: 0 | Refills: 0 | Status: DISCONTINUED | OUTPATIENT
Start: 2018-03-13 | End: 2018-03-15

## 2018-03-13 RX ORDER — OXYCODONE AND ACETAMINOPHEN 5; 325 MG/1; MG/1
1 TABLET ORAL EVERY 6 HOURS
Qty: 0 | Refills: 0 | Status: DISCONTINUED | OUTPATIENT
Start: 2018-03-13 | End: 2018-03-14

## 2018-03-13 RX ORDER — SENNA PLUS 8.6 MG/1
2 TABLET ORAL AT BEDTIME
Qty: 0 | Refills: 0 | Status: DISCONTINUED | OUTPATIENT
Start: 2018-03-13 | End: 2018-03-15

## 2018-03-13 RX ORDER — DOCUSATE SODIUM 100 MG
100 CAPSULE ORAL THREE TIMES A DAY
Qty: 0 | Refills: 0 | Status: DISCONTINUED | OUTPATIENT
Start: 2018-03-13 | End: 2018-03-15

## 2018-03-13 RX ORDER — KETOROLAC TROMETHAMINE 30 MG/ML
15 SYRINGE (ML) INJECTION EVERY 6 HOURS
Qty: 0 | Refills: 0 | Status: DISCONTINUED | OUTPATIENT
Start: 2018-03-13 | End: 2018-03-14

## 2018-03-13 RX ORDER — ONDANSETRON 8 MG/1
4 TABLET, FILM COATED ORAL EVERY 6 HOURS
Qty: 0 | Refills: 0 | Status: DISCONTINUED | OUTPATIENT
Start: 2018-03-13 | End: 2018-03-13

## 2018-03-13 RX ORDER — ACETAMINOPHEN 500 MG
1000 TABLET ORAL ONCE
Qty: 0 | Refills: 0 | Status: COMPLETED | OUTPATIENT
Start: 2018-03-13 | End: 2018-03-13

## 2018-03-13 RX ORDER — NALOXONE HYDROCHLORIDE 4 MG/.1ML
0.1 SPRAY NASAL
Qty: 0 | Refills: 0 | Status: DISCONTINUED | OUTPATIENT
Start: 2018-03-13 | End: 2018-03-15

## 2018-03-13 RX ORDER — POLYETHYLENE GLYCOL 3350 17 G/17G
17 POWDER, FOR SOLUTION ORAL DAILY
Qty: 0 | Refills: 0 | Status: DISCONTINUED | OUTPATIENT
Start: 2018-03-13 | End: 2018-03-15

## 2018-03-13 RX ORDER — ONDANSETRON 8 MG/1
4 TABLET, FILM COATED ORAL ONCE
Qty: 0 | Refills: 0 | Status: DISCONTINUED | OUTPATIENT
Start: 2018-03-13 | End: 2018-03-14

## 2018-03-13 RX ORDER — PANTOPRAZOLE SODIUM 20 MG/1
40 TABLET, DELAYED RELEASE ORAL
Qty: 0 | Refills: 0 | Status: DISCONTINUED | OUTPATIENT
Start: 2018-03-13 | End: 2018-03-15

## 2018-03-13 RX ORDER — TEMAZEPAM 15 MG/1
30 CAPSULE ORAL AT BEDTIME
Qty: 0 | Refills: 0 | Status: DISCONTINUED | OUTPATIENT
Start: 2018-03-13 | End: 2018-03-15

## 2018-03-13 RX ADMIN — Medication 100 MILLIGRAM(S): at 14:27

## 2018-03-13 RX ADMIN — HEPARIN SODIUM 5000 UNIT(S): 5000 INJECTION INTRAVENOUS; SUBCUTANEOUS at 14:27

## 2018-03-13 RX ADMIN — HEPARIN SODIUM 5000 UNIT(S): 5000 INJECTION INTRAVENOUS; SUBCUTANEOUS at 07:20

## 2018-03-13 RX ADMIN — SODIUM CHLORIDE 3 MILLILITER(S): 9 INJECTION INTRAMUSCULAR; INTRAVENOUS; SUBCUTANEOUS at 13:21

## 2018-03-13 RX ADMIN — Medication 15 MILLIGRAM(S): at 18:45

## 2018-03-13 RX ADMIN — Medication 250 MILLIGRAM(S): at 08:33

## 2018-03-13 RX ADMIN — OXYCODONE AND ACETAMINOPHEN 1 TABLET(S): 5; 325 TABLET ORAL at 16:23

## 2018-03-13 RX ADMIN — Medication 15 MILLIGRAM(S): at 23:19

## 2018-03-13 RX ADMIN — Medication 1200 MILLIGRAM(S): at 07:20

## 2018-03-13 RX ADMIN — Medication 400 MILLIGRAM(S): at 18:45

## 2018-03-13 RX ADMIN — HYDROMORPHONE HYDROCHLORIDE 0.5 MILLIGRAM(S): 2 INJECTION INTRAMUSCULAR; INTRAVENOUS; SUBCUTANEOUS at 02:00

## 2018-03-13 RX ADMIN — Medication 15 MILLIGRAM(S): at 23:35

## 2018-03-13 RX ADMIN — Medication 100 MILLIGRAM(S): at 22:08

## 2018-03-13 RX ADMIN — SENNA PLUS 2 TABLET(S): 8.6 TABLET ORAL at 22:08

## 2018-03-13 RX ADMIN — TEMAZEPAM 30 MILLIGRAM(S): 15 CAPSULE ORAL at 22:08

## 2018-03-13 RX ADMIN — Medication 15 MILLIGRAM(S): at 19:28

## 2018-03-13 RX ADMIN — Medication 1000 MILLIGRAM(S): at 19:27

## 2018-03-13 RX ADMIN — SODIUM CHLORIDE 3 MILLILITER(S): 9 INJECTION INTRAMUSCULAR; INTRAVENOUS; SUBCUTANEOUS at 21:48

## 2018-03-13 RX ADMIN — HYDROMORPHONE HYDROCHLORIDE 0.5 MILLIGRAM(S): 2 INJECTION INTRAMUSCULAR; INTRAVENOUS; SUBCUTANEOUS at 02:15

## 2018-03-13 RX ADMIN — OXYCODONE AND ACETAMINOPHEN 1 TABLET(S): 5; 325 TABLET ORAL at 11:00

## 2018-03-13 RX ADMIN — Medication 25 MICROGRAM(S): at 09:22

## 2018-03-13 RX ADMIN — Medication 125 MILLILITER(S): at 01:11

## 2018-03-13 RX ADMIN — Medication 1200 MILLIGRAM(S): at 18:45

## 2018-03-13 RX ADMIN — POLYETHYLENE GLYCOL 3350 17 GRAM(S): 17 POWDER, FOR SOLUTION ORAL at 10:39

## 2018-03-13 RX ADMIN — Medication 0.5 MILLIGRAM(S): at 14:31

## 2018-03-13 RX ADMIN — Medication 500 MICROGRAM(S): at 01:10

## 2018-03-13 RX ADMIN — OXYCODONE AND ACETAMINOPHEN 1 TABLET(S): 5; 325 TABLET ORAL at 18:42

## 2018-03-13 RX ADMIN — OXYCODONE AND ACETAMINOPHEN 1 TABLET(S): 5; 325 TABLET ORAL at 10:59

## 2018-03-13 RX ADMIN — HEPARIN SODIUM 5000 UNIT(S): 5000 INJECTION INTRAVENOUS; SUBCUTANEOUS at 22:08

## 2018-03-13 NOTE — PHYSICAL THERAPY INITIAL EVALUATION ADULT - DID THE PATIENT HAVE SURGERY?
yes/VATS (video-assisted thoracoscopic surgery),  right, robotic assisted, Tracheobronchoblasty with prolene mesh

## 2018-03-13 NOTE — PROGRESS NOTE ADULT - SUBJECTIVE AND OBJECTIVE BOX
CTICU  CRITICAL  CARE  attending     Hand off received 					   Pertinent clinical, laboratory, radiographic, hemodynamic, echocardiographic, respiratory data, microbiologic data and chart were reviewed and analyzed frequently throughout the course of the day and night  Patient seen and examined with [facility] attending Dr Singer at bedside  CRITICAL CARE TIME SPENT in evaluation and management, reassessments, review and interpretation of labs and x-rays, ventilator and hemodynamic management, formulating a plan and coordinating care: ___90____ MIN.  Time does not include procedural time.  CTICU ATTENDING     					    Kaleb Rojas MD

## 2018-03-13 NOTE — PROGRESS NOTE ADULT - SUBJECTIVE AND OBJECTIVE BOX
HPI:  Pt is a 46 yo F with h/o pertussis, cough, SOB worked up with the following  1) PFT showed FEV1= 2.48, 93 % of predicted value, FVC =2.76, 86% of predicted value, PEF= 5.33, 89% of predicted value and DLCO -23.3, 108 % of predicted value.   2) CT chest on 1/17/18 revealed: 70% narrowing of trachea on the expiratory phase, which is consistent with tracheomalacia, as well as a 3 mm RLL nodule noted.   3) Bronchoscopy 2/7/18 demonstrated that there was a 75% distral tracheal collapse on forced expiration, 90 % collapse of the left main bronchus, and 75% collapse of the right main bronchus with force exhalation. There was no significant collapse of the trachea with quiet breathing.   Pt dx'd with tracheomalacia and 3/12 s/p R VATS, robotic assisted tracheobronchoblasty with prolene mesh and placement of R CT. EBL 50ml Intra-op fluids 700ml Crystalloids UO 200ml. Today pt developed chest and neck sq emphysema    ## Labs:  CBC:                        11.7   11.2  )-----------( 239      ( 13 Mar 2018 06:08 )             35.5     Chem:  03-13    135  |  98  |  17  ----------------------------<  106<H>  3.8   |  25  |  0.72    Ca    8.2<L>      13 Mar 2018 06:08  Phos  4.1     03-13  Mg     2.0     03-13    TPro  6.4  /  Alb  3.5  /  TBili  1.2  /  DBili  x   /  AST  23  /  ALT  19  /  AlkPhos  41  03-13    Coags:  PT/INR - ( 13 Mar 2018 07:26 )   PT: 10.9 sec;   INR: 0.98        PTT - ( 13 Mar 2018 07:26 )  PTT:29.5 sec    ## Imaging:    ## Medications:    guaiFENesin ER 1200 milliGRAM(s) Oral every 12 hours    levothyroxine 25 MICROGram(s) Oral daily    heparin  Injectable 5000 Unit(s) SubCutaneous every 8 hours    docusate sodium 100 milliGRAM(s) Oral three times a day  pantoprazole    Tablet 40 milliGRAM(s) Oral before breakfast  polyethylene glycol 3350 17 Gram(s) Oral daily  senna 2 Tablet(s) Oral at bedtime    ALPRAZolam 0.5 milliGRAM(s) Oral three times a day PRN  ondansetron Injectable 4 milliGRAM(s) IV Push every 6 hours PRN  oxyCODONE    5 mG/acetaminophen 325 mG 1 Tablet(s) Oral every 6 hours PRN  temazepam 30 milliGRAM(s) Oral at bedtime PRN    ## Vitals:  T(C): 37.3 (03-13-18 @ 10:39), Max: 37.3 (03-13-18 @ 10:39)  HR: 76 (03-13-18 @ 14:00) (56 - 84)  BP: 108/55 (03-13-18 @ 13:17) (84/51 - 108/55)  BP(mean): 81 (03-13-18 @ 13:17) (51 - 81)  RR: 20 (03-13-18 @ 14:00) (12 - 34)  SpO2: 100% (03-13-18 @ 14:00) (92% - 100%)  Wt(kg): --  Vent:   ABG: ABG - ( 12 Mar 2018 23:16 )  pH: 7.43  /  pCO2: 33    /  pO2: 72    / HCO3: 22    / Base Excess: -2.0  /  SaO2: 95        03-12 @ 07:01  -  03-13 @ 07:00  --------------------------------------------------------  IN: 578.5 mL / OUT: 410 mL / NET: 168.5 mL    03-13 @ 07:01  -  03-13 @ 16:05  --------------------------------------------------------  IN: 550 mL / OUT: 520 mL / NET: 30 mL      ## P/E:  Gen: lying slightly ucomfortably in bed in no apparent distress  Neck/chest: sq emphysema  Lungs:   Heart:   Abd:  Ext:  Neuro:    CENTRAL LINE: [ ] YES [ ] NO  LOCATION:   DATE INSERTED:  REMOVE: [ ] YES [ ] NO      MELVIN: [ ] YES [ ] NO    DATE INSERTED:  REMOVE:  [ ] YES [ ] NO      A-LINE:  [ ] YES [ ] NO  LOCATION:   DATE INSERTED:  REMOVE:  [ ] YES [ ] NO  EXPLAIN:    GLOBAL ISSUE/BEST PRACTICE:  Analgesia:  Sedation:  HOB elevation: yes  Stress ulcer prophylaxis:  VTE prophylaxis:  Oral Care:  Glycemic control:  Nutrition:    CODE STATUS: [ ] full code  [ ] DNR  [ ] DNI  [ ] MOLST  Goals of care discussion: [ ] yes HPI:  Pt is a 46 yo F with h/o pertussis, cough, SOB worked up with the following  1) PFT showed FEV1= 2.48, 93 % of predicted value, FVC =2.76, 86% of predicted value, PEF= 5.33, 89% of predicted value and DLCO -23.3, 108 % of predicted value.   2) CT chest on 1/17/18 revealed: 70% narrowing of trachea on the expiratory phase, which is consistent with tracheomalacia, as well as a 3 mm RLL nodule noted.   3) Bronchoscopy 2/7/18 demonstrated that there was a 75% distral tracheal collapse on forced expiration, 90 % collapse of the left main bronchus, and 75% collapse of the right main bronchus with force exhalation. There was no significant collapse of the trachea with quiet breathing.   Pt dx'd with tracheomalacia and 3/12 s/p R VATS, robotic assisted tracheobronchoblasty with prolene mesh and placement of R CT. EBL 50ml Intra-op fluids 700ml Crystalloids UO 200ml. Today pt developed chest and neck sq emphysema    ## Labs:  CBC:                        11.7   11.2  )-----------( 239      ( 13 Mar 2018 06:08 )             35.5     Chem:  03-13    135  |  98  |  17  ----------------------------<  106<H>  3.8   |  25  |  0.72    Ca    8.2<L>      13 Mar 2018 06:08  Phos  4.1     03-13  Mg     2.0     03-13    TPro  6.4  /  Alb  3.5  /  TBili  1.2  /  DBili  x   /  AST  23  /  ALT  19  /  AlkPhos  41  03-13    Coags:  PT/INR - ( 13 Mar 2018 07:26 )   PT: 10.9 sec;   INR: 0.98        PTT - ( 13 Mar 2018 07:26 )  PTT:29.5 sec    ## Imaging:    ## Medications:    guaiFENesin ER 1200 milliGRAM(s) Oral every 12 hours    levothyroxine 25 MICROGram(s) Oral daily    heparin  Injectable 5000 Unit(s) SubCutaneous every 8 hours    docusate sodium 100 milliGRAM(s) Oral three times a day  pantoprazole    Tablet 40 milliGRAM(s) Oral before breakfast  polyethylene glycol 3350 17 Gram(s) Oral daily  senna 2 Tablet(s) Oral at bedtime    ALPRAZolam 0.5 milliGRAM(s) Oral three times a day PRN  ondansetron Injectable 4 milliGRAM(s) IV Push every 6 hours PRN  oxyCODONE    5 mG/acetaminophen 325 mG 1 Tablet(s) Oral every 6 hours PRN  temazepam 30 milliGRAM(s) Oral at bedtime PRN    ## Vitals:  T(C): 37.3 (03-13-18 @ 10:39), Max: 37.3 (03-13-18 @ 10:39)  HR: 76 (03-13-18 @ 14:00) (56 - 84)  BP: 108/55 (03-13-18 @ 13:17) (84/51 - 108/55)  BP(mean): 81 (03-13-18 @ 13:17) (51 - 81)  RR: 20 (03-13-18 @ 14:00) (12 - 34)  SpO2: 100% (03-13-18 @ 14:00) (92% - 100%)  Wt(kg): --  Vent:   ABG: ABG - ( 12 Mar 2018 23:16 )  pH: 7.43  /  pCO2: 33    /  pO2: 72    / HCO3: 22    / Base Excess: -2.0  /  SaO2: 95        03-12 @ 07:01  -  03-13 @ 07:00  --------------------------------------------------------  IN: 578.5 mL / OUT: 410 mL / NET: 168.5 mL    03-13 @ 07:01  -  03-13 @ 16:05  --------------------------------------------------------  IN: 550 mL / OUT: 520 mL / NET: 30 mL      ## P/E:  Gen: lying slightly ucomfortably in bed in no apparent distress  Neck/chest: sq emphysema  Lungs: Good air entry b/l  Heart: RRR  Abd: Soft/+BS  Ext: No edema  Neuro: Awake/alert    CENTRAL LINE: [ ] YES [x ] NO  LOCATION:   DATE INSERTED:  REMOVE: [ ] YES [ ] NO      MELVIN: [ ] YES [ x] NO    DATE INSERTED:  REMOVE:  [ ] YES [ ] NO      A-LINE:  [ ] YES [x ] NO  LOCATION:   DATE INSERTED:  REMOVE:  [ ] YES [ ] NO  EXPLAIN:    CODE STATUS: [ x] full code  [ ] DNR  [ ] DNI  [ ] MOLST  Goals of care discussion: [ ] yes

## 2018-03-13 NOTE — PROGRESS NOTE ADULT - ASSESSMENT
Pt is a 46 yo F with h/o pertussis, cough, SOB worked up with the following  1) PFT showed FEV1= 2.48, 93 % of predicted value, FVC =2.76, 86% of predicted value, PEF= 5.33, 89% of predicted value and DLCO -23.3, 108 % of predicted value.   2) CT chest on 1/17/18 revealed: 70% narrowing of trachea on the expiratory phase, which is consistent with tracheomalacia, as well as a 3 mm RLL nodule noted.   3) Bronchoscopy 2/7/18 demonstrated that there was a 75% distral tracheal collapse on forced expiration, 90 % collapse of the left main bronchus, and 75% collapse of the right main bronchus with force exhalation. There was no significant collapse of the trachea with quiet breathing.   Pt dx'd with tracheomalacia and today is s/p R VATS, robotic assisted tracheobronchoblasty with prolene mesh and placement of R CT. EBL 50ml Intra-op fluids 700ml Crystalloids UO 200ml    Resp: R CT removed/ Supplemental O2 prn  ID: Vanco prophylaxis  CVS:  HEME: Heparin sq  FEN: NPO  Endo: Adjust Insulin drip to Glu/ Cont Synthroid  Renal: Follow BUN/Cr and UO  Pain management prn/ OOB->chair    CCT: 40 min Pt is a 46 yo F with h/o pertussis, cough, SOB worked up with the following  1) PFT showed FEV1= 2.48, 93 % of predicted value, FVC =2.76, 86% of predicted value, PEF= 5.33, 89% of predicted value and DLCO -23.3, 108 % of predicted value.   2) CT chest on 1/17/18 revealed: 70% narrowing of trachea on the expiratory phase, which is consistent with tracheomalacia, as well as a 3 mm RLL nodule noted.   3) Bronchoscopy 2/7/18 demonstrated that there was a 75% distral tracheal collapse on forced expiration, 90 % collapse of the left main bronchus, and 75% collapse of the right main bronchus with force exhalation. There was no significant collapse of the trachea with quiet breathing.   Pt dx'd with tracheomalacia and today is s/p R VATS, robotic assisted tracheobronchoblasty with prolene mesh and placement of R CT. EBL 50ml Intra-op fluids 700ml Crystalloids UO 200ml. R CT pulled 3/12.  Today pt developed chest and neck sq emphysema    Resp: s/p incision R shoulder to decrease sq emphysema  ID: Vanco prophylaxis  CVS:  HEME: Heparin sq  FEN: NPO  Endo: Adjust Insulin drip to Glu/ Cont Synthroid  Renal: Follow BUN/Cr and UO  Pain management prn/ OOB->chair    CCT: 40 min Pt is a 48 yo F with h/o pertussis, cough, SOB worked up with the following  1) PFT showed FEV1= 2.48, 93 % of predicted value, FVC =2.76, 86% of predicted value, PEF= 5.33, 89% of predicted value and DLCO -23.3, 108 % of predicted value.   2) CT chest on 1/17/18 revealed: 70% narrowing of trachea on the expiratory phase, which is consistent with tracheomalacia, as well as a 3 mm RLL nodule noted.   3) Bronchoscopy 2/7/18 demonstrated that there was a 75% distral tracheal collapse on forced expiration, 90 % collapse of the left main bronchus, and 75% collapse of the right main bronchus with force exhalation. There was no significant collapse of the trachea with quiet breathing.   Pt dx'd with tracheomalacia and today is s/p R VATS, robotic assisted tracheobronchoblasty with prolene mesh and placement of R CT. EBL 50ml Intra-op fluids 700ml Crystalloids UO 200ml. R CT pulled 3/12.  Today pt developed chest and neck sq emphysema    Resp: s/p incision R shoulder to decrease sq emphysema  ID: s/p Vanco prophylaxis  HEME: Heparin sq  FEN: Po diet  Endo: Cont Synthroid  Renal: Follow BUN/Cr and UO  Pain management prn/ OOB->chair    CCT: 35 min

## 2018-03-14 LAB
ANION GAP SERPL CALC-SCNC: 12 MMOL/L — SIGNIFICANT CHANGE UP (ref 5–17)
BUN SERPL-MCNC: 16 MG/DL — SIGNIFICANT CHANGE UP (ref 7–23)
CALCIUM SERPL-MCNC: 8.6 MG/DL — SIGNIFICANT CHANGE UP (ref 8.4–10.5)
CHLORIDE SERPL-SCNC: 103 MMOL/L — SIGNIFICANT CHANGE UP (ref 96–108)
CO2 SERPL-SCNC: 25 MMOL/L — SIGNIFICANT CHANGE UP (ref 22–31)
CREAT SERPL-MCNC: 0.71 MG/DL — SIGNIFICANT CHANGE UP (ref 0.5–1.3)
GLUCOSE SERPL-MCNC: 92 MG/DL — SIGNIFICANT CHANGE UP (ref 70–99)
HCT VFR BLD CALC: 35.9 % — SIGNIFICANT CHANGE UP (ref 34.5–45)
HGB BLD-MCNC: 11.8 G/DL — SIGNIFICANT CHANGE UP (ref 11.5–15.5)
MAGNESIUM SERPL-MCNC: 2.1 MG/DL — SIGNIFICANT CHANGE UP (ref 1.6–2.6)
MCHC RBC-ENTMCNC: 29.3 PG — SIGNIFICANT CHANGE UP (ref 27–34)
MCHC RBC-ENTMCNC: 32.9 G/DL — SIGNIFICANT CHANGE UP (ref 32–36)
MCV RBC AUTO: 89.1 FL — SIGNIFICANT CHANGE UP (ref 80–100)
PHOSPHATE SERPL-MCNC: 3.4 MG/DL — SIGNIFICANT CHANGE UP (ref 2.5–4.5)
PLATELET # BLD AUTO: 256 K/UL — SIGNIFICANT CHANGE UP (ref 150–400)
POTASSIUM SERPL-MCNC: 3.8 MMOL/L — SIGNIFICANT CHANGE UP (ref 3.5–5.3)
POTASSIUM SERPL-SCNC: 3.8 MMOL/L — SIGNIFICANT CHANGE UP (ref 3.5–5.3)
RBC # BLD: 4.03 M/UL — SIGNIFICANT CHANGE UP (ref 3.8–5.2)
RBC # FLD: 13.1 % — SIGNIFICANT CHANGE UP (ref 10.3–16.9)
SODIUM SERPL-SCNC: 140 MMOL/L — SIGNIFICANT CHANGE UP (ref 135–145)
WBC # BLD: 9.7 K/UL — SIGNIFICANT CHANGE UP (ref 3.8–10.5)
WBC # FLD AUTO: 9.7 K/UL — SIGNIFICANT CHANGE UP (ref 3.8–10.5)

## 2018-03-14 PROCEDURE — 71045 X-RAY EXAM CHEST 1 VIEW: CPT | Mod: 26

## 2018-03-14 RX ORDER — ACETAMINOPHEN 500 MG
650 TABLET ORAL EVERY 6 HOURS
Qty: 0 | Refills: 0 | Status: DISCONTINUED | OUTPATIENT
Start: 2018-03-14 | End: 2018-03-15

## 2018-03-14 RX ORDER — SODIUM CHLORIDE 9 MG/ML
3 INJECTION INTRAMUSCULAR; INTRAVENOUS; SUBCUTANEOUS EVERY 8 HOURS
Qty: 0 | Refills: 0 | Status: DISCONTINUED | OUTPATIENT
Start: 2018-03-14 | End: 2018-03-15

## 2018-03-14 RX ORDER — LINACLOTIDE 145 UG/1
290 CAPSULE, GELATIN COATED ORAL DAILY
Qty: 0 | Refills: 0 | Status: DISCONTINUED | OUTPATIENT
Start: 2018-03-14 | End: 2018-03-15

## 2018-03-14 RX ADMIN — Medication 25 MICROGRAM(S): at 05:33

## 2018-03-14 RX ADMIN — HEPARIN SODIUM 5000 UNIT(S): 5000 INJECTION INTRAVENOUS; SUBCUTANEOUS at 05:33

## 2018-03-14 RX ADMIN — Medication 15 MILLIGRAM(S): at 11:40

## 2018-03-14 RX ADMIN — Medication 100 MILLIGRAM(S): at 21:59

## 2018-03-14 RX ADMIN — Medication 15 MILLIGRAM(S): at 11:08

## 2018-03-14 RX ADMIN — Medication 650 MILLIGRAM(S): at 15:20

## 2018-03-14 RX ADMIN — Medication 15 MILLIGRAM(S): at 06:02

## 2018-03-14 RX ADMIN — Medication 15 MILLIGRAM(S): at 05:33

## 2018-03-14 RX ADMIN — SODIUM CHLORIDE 3 MILLILITER(S): 9 INJECTION INTRAMUSCULAR; INTRAVENOUS; SUBCUTANEOUS at 21:00

## 2018-03-14 RX ADMIN — Medication 1200 MILLIGRAM(S): at 05:32

## 2018-03-14 RX ADMIN — Medication 650 MILLIGRAM(S): at 20:20

## 2018-03-14 RX ADMIN — Medication 650 MILLIGRAM(S): at 19:22

## 2018-03-14 RX ADMIN — SODIUM CHLORIDE 3 MILLILITER(S): 9 INJECTION INTRAMUSCULAR; INTRAVENOUS; SUBCUTANEOUS at 15:12

## 2018-03-14 RX ADMIN — Medication 100 MILLIGRAM(S): at 05:33

## 2018-03-14 RX ADMIN — Medication 100 MILLIGRAM(S): at 14:36

## 2018-03-14 RX ADMIN — HEPARIN SODIUM 5000 UNIT(S): 5000 INJECTION INTRAVENOUS; SUBCUTANEOUS at 21:59

## 2018-03-14 RX ADMIN — PANTOPRAZOLE SODIUM 40 MILLIGRAM(S): 20 TABLET, DELAYED RELEASE ORAL at 06:01

## 2018-03-14 RX ADMIN — SODIUM CHLORIDE 3 MILLILITER(S): 9 INJECTION INTRAMUSCULAR; INTRAVENOUS; SUBCUTANEOUS at 05:32

## 2018-03-14 RX ADMIN — HEPARIN SODIUM 5000 UNIT(S): 5000 INJECTION INTRAVENOUS; SUBCUTANEOUS at 14:37

## 2018-03-14 RX ADMIN — TEMAZEPAM 30 MILLIGRAM(S): 15 CAPSULE ORAL at 22:03

## 2018-03-14 RX ADMIN — Medication 650 MILLIGRAM(S): at 14:35

## 2018-03-14 RX ADMIN — Medication 1200 MILLIGRAM(S): at 19:56

## 2018-03-14 NOTE — PROGRESS NOTE ADULT - SUBJECTIVE AND OBJECTIVE BOX
Patient discussed on morning rounds with Dr. Goodson     Operation / Date: 3/12/18 R VATS RA tracheobronchoplasty    SUBJECTIVE ASSESSMENT:    Pain well controlled, denies dyspnea/SOB    Vital Signs Last 24 Hrs  T(C): 37.2 (14 Mar 2018 17:16), Max: 37.2 (14 Mar 2018 17:16)  T(F): 98.9 (14 Mar 2018 17:16), Max: 98.9 (14 Mar 2018 17:16)  HR: 76 (14 Mar 2018 18:45) (62 - 76)  BP: 133/62 (14 Mar 2018 18:45) (93/48 - 133/65)  BP(mean): 87 (14 Mar 2018 18:45) (70 - 93)  RR: 18 (14 Mar 2018 18:45) (11 - 27)  SpO2: 96% (14 Mar 2018 18:45) (95% - 99%)  I&O's Detail    13 Mar 2018 07:01  -  14 Mar 2018 07:00  --------------------------------------------------------  IN:    IV PiggyBack: 250 mL    Oral Fluid: 400 mL  Total IN: 650 mL    OUT:    Voided: 1020 mL  Total OUT: 1020 mL    Total NET: -370 mL    CHEST TUBE:  No. .   LINDY DRAIN:  No.  EPICARDIAL WIRES: No.  TIE DOWNS: Yes.  MELVIN: No.    PHYSICAL EXAM:    General: NAD    Neurological: A&Ox3    Cardiovascular: S1S2 RRR    Respiratory: CTA b/l no W/R/R.  Sq air up to right    Gastrointestinal: soft NT/ND +BS    Extremities: no edema    Vascular: warm and well perfused bilaterally    Incision Sites: R VATS incisions C/D/i    LABS:                        11.8   9.7   )-----------( 256      ( 14 Mar 2018 03:55 )             35.9       COUMADIN:  Yes/No. REASON: .    PT/INR - ( 13 Mar 2018 07:26 )   PT: 10.9 sec;   INR: 0.98          PTT - ( 13 Mar 2018 07:26 )  PTT:29.5 sec    03-14    140  |  103  |  16  ----------------------------<  92  3.8   |  25  |  0.71    Ca    8.6      14 Mar 2018 03:55  Phos  3.4     03-14  Mg     2.1     03-14    TPro  6.4  /  Alb  3.5  /  TBili  1.2  /  DBili  x   /  AST  23  /  ALT  19  /  AlkPhos  41  03-13          MEDICATIONS  (STANDING):  benzonatate 100 milliGRAM(s) Oral every 8 hours  docusate sodium 100 milliGRAM(s) Oral three times a day  guaiFENesin ER 1200 milliGRAM(s) Oral every 12 hours  heparin  Injectable 5000 Unit(s) SubCutaneous every 8 hours  levothyroxine 25 MICROGram(s) Oral daily  linaclotide 290 MICROGram(s) Oral daily  pantoprazole    Tablet 40 milliGRAM(s) Oral before breakfast  polyethylene glycol 3350 17 Gram(s) Oral daily  senna 2 Tablet(s) Oral at bedtime  sodium chloride 0.9% lock flush 3 milliLiter(s) IV Push every 8 hours    MEDICATIONS  (PRN):  acetaminophen   Tablet. 650 milliGRAM(s) Oral every 6 hours PRN Moderate Pain (4 - 6)  ALPRAZolam 0.5 milliGRAM(s) Oral three times a day PRN anxiety  naloxone Injectable 0.1 milliGRAM(s) IV Push every 3 minutes PRN For ANY of the following changes in patient status:  A. RR LESS THAN 10 breaths per minute, B. Oxygen saturation LESS THAN 90%, C. Sedation score of 6  temazepam 30 milliGRAM(s) Oral at bedtime PRN Insomnia        RADIOLOGY & ADDITIONAL TESTS:    CXR; stable SQ air, no PTX

## 2018-03-14 NOTE — PROGRESS NOTE ADULT - ASSESSMENT
A/P:48 y/o F pod#2 s/p R VATS RA tracheobronchoplasty    Pulm: Subq air extending up right neck, satting 96% on 2L   - repeat CXR in AM   - encouraged IS/ambulation   - wean O2 as tolerated    Neuro/psych: pain well controlled, anxiety controlled   - c/w xanax prn and temazepam qhs prn   -tylenol prn pain    GI: tolerating PO   - c/w GI ppx and bowel regimen    Dispo: Home when medically ready

## 2018-03-14 NOTE — DIETITIAN INITIAL EVALUATION ADULT. - OTHER INFO
46y/o F with h/o TBM s/p Rt RA tracheobronchoplasty with prolene mesh. Pt stepped down today. She reports a fair appetite and intake; 50-75%. Denies GI distress, pain, and mechanical issues. PTA pt reports eating well and denies wt changes. Encouraged adequate intake.

## 2018-03-15 ENCOUNTER — TRANSCRIPTION ENCOUNTER (OUTPATIENT)
Age: 48
End: 2018-03-15

## 2018-03-15 VITALS — TEMPERATURE: 98 F

## 2018-03-15 LAB
ANION GAP SERPL CALC-SCNC: 13 MMOL/L — SIGNIFICANT CHANGE UP (ref 5–17)
BASOPHILS NFR BLD AUTO: 0.3 % — SIGNIFICANT CHANGE UP (ref 0–2)
BUN SERPL-MCNC: 12 MG/DL — SIGNIFICANT CHANGE UP (ref 7–23)
CALCIUM SERPL-MCNC: 8.5 MG/DL — SIGNIFICANT CHANGE UP (ref 8.4–10.5)
CHLORIDE SERPL-SCNC: 100 MMOL/L — SIGNIFICANT CHANGE UP (ref 96–108)
CO2 SERPL-SCNC: 23 MMOL/L — SIGNIFICANT CHANGE UP (ref 22–31)
CREAT SERPL-MCNC: 0.64 MG/DL — SIGNIFICANT CHANGE UP (ref 0.5–1.3)
EOSINOPHIL NFR BLD AUTO: 1.5 % — SIGNIFICANT CHANGE UP (ref 0–6)
GLUCOSE SERPL-MCNC: 93 MG/DL — SIGNIFICANT CHANGE UP (ref 70–99)
HCT VFR BLD CALC: 37.2 % — SIGNIFICANT CHANGE UP (ref 34.5–45)
HGB BLD-MCNC: 12.2 G/DL — SIGNIFICANT CHANGE UP (ref 11.5–15.5)
LYMPHOCYTES # BLD AUTO: 19.3 % — SIGNIFICANT CHANGE UP (ref 13–44)
MAGNESIUM SERPL-MCNC: 2.1 MG/DL — SIGNIFICANT CHANGE UP (ref 1.6–2.6)
MCHC RBC-ENTMCNC: 29 PG — SIGNIFICANT CHANGE UP (ref 27–34)
MCHC RBC-ENTMCNC: 32.8 G/DL — SIGNIFICANT CHANGE UP (ref 32–36)
MCV RBC AUTO: 88.6 FL — SIGNIFICANT CHANGE UP (ref 80–100)
MONOCYTES NFR BLD AUTO: 7.5 % — SIGNIFICANT CHANGE UP (ref 2–14)
NEUTROPHILS NFR BLD AUTO: 71.4 % — SIGNIFICANT CHANGE UP (ref 43–77)
PLATELET # BLD AUTO: 279 K/UL — SIGNIFICANT CHANGE UP (ref 150–400)
POTASSIUM SERPL-MCNC: 3.9 MMOL/L — SIGNIFICANT CHANGE UP (ref 3.5–5.3)
POTASSIUM SERPL-SCNC: 3.9 MMOL/L — SIGNIFICANT CHANGE UP (ref 3.5–5.3)
RBC # BLD: 4.2 M/UL — SIGNIFICANT CHANGE UP (ref 3.8–5.2)
RBC # FLD: 12.9 % — SIGNIFICANT CHANGE UP (ref 10.3–16.9)
SODIUM SERPL-SCNC: 136 MMOL/L — SIGNIFICANT CHANGE UP (ref 135–145)
SURGICAL PATHOLOGY STUDY: SIGNIFICANT CHANGE UP
WBC # BLD: 7.8 K/UL — SIGNIFICANT CHANGE UP (ref 3.8–10.5)
WBC # FLD AUTO: 7.8 K/UL — SIGNIFICANT CHANGE UP (ref 3.8–10.5)

## 2018-03-15 PROCEDURE — 36415 COLL VENOUS BLD VENIPUNCTURE: CPT

## 2018-03-15 PROCEDURE — 83735 ASSAY OF MAGNESIUM: CPT

## 2018-03-15 PROCEDURE — 93005 ELECTROCARDIOGRAM TRACING: CPT

## 2018-03-15 PROCEDURE — 82330 ASSAY OF CALCIUM: CPT

## 2018-03-15 PROCEDURE — 80048 BASIC METABOLIC PNL TOTAL CA: CPT

## 2018-03-15 PROCEDURE — 85027 COMPLETE CBC AUTOMATED: CPT

## 2018-03-15 PROCEDURE — 84295 ASSAY OF SERUM SODIUM: CPT

## 2018-03-15 PROCEDURE — 84100 ASSAY OF PHOSPHORUS: CPT

## 2018-03-15 PROCEDURE — S2900: CPT

## 2018-03-15 PROCEDURE — 83605 ASSAY OF LACTIC ACID: CPT

## 2018-03-15 PROCEDURE — 85610 PROTHROMBIN TIME: CPT

## 2018-03-15 PROCEDURE — 82803 BLOOD GASES ANY COMBINATION: CPT

## 2018-03-15 PROCEDURE — 86901 BLOOD TYPING SEROLOGIC RH(D): CPT

## 2018-03-15 PROCEDURE — 85730 THROMBOPLASTIN TIME PARTIAL: CPT

## 2018-03-15 PROCEDURE — 85025 COMPLETE CBC W/AUTO DIFF WBC: CPT

## 2018-03-15 PROCEDURE — 97161 PT EVAL LOW COMPLEX 20 MIN: CPT

## 2018-03-15 PROCEDURE — 71045 X-RAY EXAM CHEST 1 VIEW: CPT

## 2018-03-15 PROCEDURE — 94640 AIRWAY INHALATION TREATMENT: CPT

## 2018-03-15 PROCEDURE — 71045 X-RAY EXAM CHEST 1 VIEW: CPT | Mod: 26,76

## 2018-03-15 PROCEDURE — 88305 TISSUE EXAM BY PATHOLOGIST: CPT

## 2018-03-15 PROCEDURE — P9045: CPT

## 2018-03-15 PROCEDURE — 80053 COMPREHEN METABOLIC PANEL: CPT

## 2018-03-15 PROCEDURE — 86900 BLOOD TYPING SEROLOGIC ABO: CPT

## 2018-03-15 PROCEDURE — 84132 ASSAY OF SERUM POTASSIUM: CPT

## 2018-03-15 PROCEDURE — 86850 RBC ANTIBODY SCREEN: CPT

## 2018-03-15 PROCEDURE — C1781: CPT

## 2018-03-15 RX ORDER — TEMAZEPAM 15 MG/1
1 CAPSULE ORAL
Qty: 0 | Refills: 0 | COMMUNITY

## 2018-03-15 RX ORDER — LEVOTHYROXINE SODIUM 125 MCG
1 TABLET ORAL
Qty: 30 | Refills: 0
Start: 2018-03-15 | End: 2018-04-13

## 2018-03-15 RX ORDER — POLYETHYLENE GLYCOL 3350 17 G/17G
17 POWDER, FOR SOLUTION ORAL
Qty: 1 | Refills: 0 | OUTPATIENT
Start: 2018-03-15

## 2018-03-15 RX ORDER — LEVOTHYROXINE SODIUM 125 MCG
1 TABLET ORAL
Qty: 0 | Refills: 0 | COMMUNITY

## 2018-03-15 RX ORDER — ALPRAZOLAM 0.25 MG
1 TABLET ORAL
Qty: 0 | Refills: 0 | COMMUNITY

## 2018-03-15 RX ORDER — TEMAZEPAM 15 MG/1
1 CAPSULE ORAL
Qty: 30 | Refills: 0
Start: 2018-03-15 | End: 2018-04-13

## 2018-03-15 RX ORDER — ALPRAZOLAM 0.25 MG
1 TABLET ORAL
Qty: 60 | Refills: 0
Start: 2018-03-15

## 2018-03-15 RX ORDER — BACITRACIN ZINC 500 UNIT/G
1 OINTMENT IN PACKET (EA) TOPICAL DAILY
Qty: 0 | Refills: 0 | Status: DISCONTINUED | OUTPATIENT
Start: 2018-03-15 | End: 2018-03-15

## 2018-03-15 RX ORDER — LINACLOTIDE 145 UG/1
1 CAPSULE, GELATIN COATED ORAL
Qty: 30 | Refills: 0
Start: 2018-03-15 | End: 2018-04-13

## 2018-03-15 RX ORDER — POTASSIUM CHLORIDE 20 MEQ
20 PACKET (EA) ORAL ONCE
Qty: 0 | Refills: 0 | Status: COMPLETED | OUTPATIENT
Start: 2018-03-15 | End: 2018-03-15

## 2018-03-15 RX ORDER — ACETAMINOPHEN 500 MG
2 TABLET ORAL
Qty: 0 | Refills: 0 | COMMUNITY
Start: 2018-03-15

## 2018-03-15 RX ORDER — LINACLOTIDE 145 UG/1
1 CAPSULE, GELATIN COATED ORAL
Qty: 0 | Refills: 0 | COMMUNITY

## 2018-03-15 RX ADMIN — Medication 20 MILLIEQUIVALENT(S): at 09:12

## 2018-03-15 RX ADMIN — Medication 650 MILLIGRAM(S): at 04:00

## 2018-03-15 RX ADMIN — LINACLOTIDE 290 MICROGRAM(S): 145 CAPSULE, GELATIN COATED ORAL at 05:50

## 2018-03-15 RX ADMIN — Medication 100 MILLIGRAM(S): at 05:51

## 2018-03-15 RX ADMIN — Medication 25 MICROGRAM(S): at 05:51

## 2018-03-15 RX ADMIN — SODIUM CHLORIDE 3 MILLILITER(S): 9 INJECTION INTRAMUSCULAR; INTRAVENOUS; SUBCUTANEOUS at 05:55

## 2018-03-15 RX ADMIN — Medication 1200 MILLIGRAM(S): at 05:51

## 2018-03-15 RX ADMIN — Medication 650 MILLIGRAM(S): at 03:01

## 2018-03-15 NOTE — DISCHARGE NOTE ADULT - CARE PROVIDER_API CALL
Nathaniel Goodson (MD), Surgery; Thoracic Surgery  [address]  4th Floor  [place], [place] [address]  Phone: [phone]  Fax: [phone]

## 2018-03-15 NOTE — DISCHARGE NOTE ADULT - NSFTFSERV1RD_GEN_ALL_CORE
teaching and training/observation and assessment/medication teaching and assessment/wound care and assessment

## 2018-03-15 NOTE — DISCHARGE NOTE ADULT - PLAN OF CARE
s/p tracheobronchoplasty -Please follow up with Dr. Goodson on 3/19/18 at 10:30AM.  The office is located at [facility], [facility], 4th floor. Call us with any questions #[phone].  - Please make a follow-up appointment with your primary care provider within 1-2 weeks of discharge to discuss your recent surgery.  -Walk daily as tolerated and use your incentive spirometer every hour.  -No driving or strenuous activity/exercise for 6 weeks, or until cleared by your surgeon. Please do not lift anything greater than ten pounds.   -Gently clean your incisions with anti-bacterial soap and water, pat dry.  You may leave them open to air. Please keep your chest incision dressing intact until your follow-up appointment with Dr. Goodson.   -Call your doctor if you have shortness of breath, chest pain not relieved by pain medication, dizziness, fever >101.5, or increased redness or drainage from incisions. -Please follow up with Dr. Goodson on 3/19/18 at 10:30AM.  The office is located at [facility], [facility], 4th floor. Call us with any questions #[phone].  - Please make a follow-up appointment with your primary care provider within 1-2 weeks of discharge to discuss your recent surgery.  -Walk daily as tolerated and use your incentive spirometer every hour.  -No driving or strenuous activity/exercise for 6 weeks, or until cleared by your surgeon. Please do not lift anything greater than ten pounds.   -Gently clean your incisions with anti-bacterial soap and water, pat dry.  You may leave them open to air. Please keep your chest incision dressing intact until your follow-up appointment with Dr. Goodson.   -Call your doctor if you have shortness of breath, chest pain not relieved by pain medication, dizziness, fever >101.5, or increased redness or drainage from incisions, please go to your nearest ED if you experience increased subcutaneous air and swelling in the chest/neck or face, if you have difficulty breathing or voice changes.

## 2018-03-15 NOTE — DISCHARGE NOTE ADULT - CARE PLAN
Principal Discharge DX:	Tracheomalacia  Goal:	s/p tracheobronchoplasty  Assessment and plan of treatment:	-Please follow up with Dr. Goodson on 3/19/18 at 10:30AM.  The office is located at [facility], [facility], 4th floor. Call us with any questions #[phone].  - Please make a follow-up appointment with your primary care provider within 1-2 weeks of discharge to discuss your recent surgery.  -Walk daily as tolerated and use your incentive spirometer every hour.  -No driving or strenuous activity/exercise for 6 weeks, or until cleared by your surgeon. Please do not lift anything greater than ten pounds.   -Gently clean your incisions with anti-bacterial soap and water, pat dry.  You may leave them open to air. Please keep your chest incision dressing intact until your follow-up appointment with Dr. Goodson.   -Call your doctor if you have shortness of breath, chest pain not relieved by pain medication, dizziness, fever >101.5, or increased redness or drainage from incisions. Principal Discharge DX:	Tracheomalacia  Goal:	s/p tracheobronchoplasty  Assessment and plan of treatment:	-Please follow up with Dr. Goodson on 3/19/18 at 10:30AM.  The office is located at [facility], [facility], 4th floor. Call us with any questions #[phone].  - Please make a follow-up appointment with your primary care provider within 1-2 weeks of discharge to discuss your recent surgery.  -Walk daily as tolerated and use your incentive spirometer every hour.  -No driving or strenuous activity/exercise for 6 weeks, or until cleared by your surgeon. Please do not lift anything greater than ten pounds.   -Gently clean your incisions with anti-bacterial soap and water, pat dry.  You may leave them open to air. Please keep your chest incision dressing intact until your follow-up appointment with Dr. Goodson.   -Call your doctor if you have shortness of breath, chest pain not relieved by pain medication, dizziness, fever >101.5, or increased redness or drainage from incisions, please go to your nearest ED if you experience increased subcutaneous air and swelling in the chest/neck or face, if you have difficulty breathing or voice changes.

## 2018-03-15 NOTE — DISCHARGE NOTE ADULT - MEDICATION SUMMARY - MEDICATIONS TO TAKE
I will START or STAY ON the medications listed below when I get home from the hospital:    acetaminophen 325 mg oral tablet  -- 2 tab(s) by mouth every 6 hours, As needed, Moderate Pain (4 - 6)  -- Indication: For as needed for pain    benzonatate 100 mg oral capsule  -- 1 cap(s) by mouth every 8 hours, As Needed   -- Indication: For as needed for cough    Xanax 0.5 mg oral tablet  -- 1 tab(s) by mouth 3 times a day, As Needed MDD:3 tablets  -- Indication: For as needed for anxiety    temazepam 30 mg oral capsule  -- 1 cap(s) by mouth once a day (at bedtime) MDD:1 capsule  -- Indication: For sleep aid    guaiFENesin 1200 mg oral tablet, extended release  -- 1 tab(s) by mouth every 12 hours, As Needed     dispense 1 month supply  -- Indication: For as needed for cough    Linzess 290 mcg oral capsule  -- 1 cap(s) by mouth once a day  -- Indication: For gastrointestinal agent    polyethylene glycol 3350 oral powder for reconstitution  -- 17 gram(s) by mouth once a day, As Needed     Dispense one month supply  -- Indication: For as needed for constipation    sulfamethoxazole-trimethoprim 800 mg-160 mg oral tablet  -- 1 tab(s) by mouth once a day  -- Indication: For skin infection protection    levothyroxine 25 mcg (0.025 mg) oral tablet  -- 1 tab(s) by mouth once a day  -- Indication: For hypothyroidism I will START or STAY ON the medications listed below when I get home from the hospital:    acetaminophen 325 mg oral tablet  -- 2 tab(s) by mouth every 6 hours, As needed, Moderate Pain (4 - 6)  -- Indication: For as needed for pain    Percocet 5/325 oral tablet  -- 1 tab(s) by mouth 2 times a day, As Needed severe pain MDD:2 tablets  -- Caution federal law prohibits the transfer of this drug to any person other  than the person for whom it was prescribed.  May cause drowsiness.  Alcohol may intensify this effect.  Use care when operating dangerous machinery.  This prescription cannot be refilled.  This product contains acetaminophen.  Do not use  with any other product containing acetaminophen to prevent possible liver damage.  Using more of this medication than prescribed may cause serious breathing problems.    -- Indication: For as needed for severe pain    benzonatate 100 mg oral capsule  -- 1 cap(s) by mouth every 8 hours, As Needed   -- Indication: For as needed for cough    Xanax 0.5 mg oral tablet  -- 1 tab(s) by mouth 3 times a day, As Needed MDD:3 tablets  -- Indication: For as needed for anxiety    temazepam 30 mg oral capsule  -- 1 cap(s) by mouth once a day (at bedtime) MDD:1 capsule  -- Indication: For sleep aid    guaiFENesin 1200 mg oral tablet, extended release  -- 1 tab(s) by mouth every 12 hours, As Needed     dispense 1 month supply  -- Indication: For as needed for cough    Linzess 290 mcg oral capsule  -- 1 cap(s) by mouth once a day  -- Indication: For gastrointestinal agent    polyethylene glycol 3350 oral powder for reconstitution  -- 17 gram(s) by mouth once a day, As Needed     Dispense one month supply  -- Indication: For as needed for constipation    sulfamethoxazole-trimethoprim 800 mg-160 mg oral tablet  -- 1 tab(s) by mouth once a day  -- Indication: For skin infection protection    levothyroxine 25 mcg (0.025 mg) oral tablet  -- 1 tab(s) by mouth once a day  -- Indication: For hypothyroidism

## 2018-03-15 NOTE — DISCHARGE NOTE ADULT - HOSPITAL COURSE
This is a 46 y/o F w/ PMH of pertussis, cough, SOB, originally referred by Dr. Jack Summers who presented for surgical correction of tracheomalacia. Pt's chief complaint was a dry non productive cough and repeated pulmonary infections which started approximately 2 years ago. Her cough had progressively worsened in the past few months and she was experiencing SOB on exertion. CT chest on 1/17/18 revealed: 70% narrowing of trachea on the expiratory phase, which is consistent with tracheomalacia, as well as a 3 mm RLL nodule noted.  Bronchoscopy 2/7/18 demonstrated that there was a 75% distal tracheal collapse on forced expiration, 90 % collapse of the left main bronchus, and 75% collapse of the right main bronchus with force exhalation. There was no significant collapse of the trachea with quiet breathing. She was given a trial of Elavil for the cough and is on 30 mg nightly. Pt was admitted on 3/12/18 for elective Tracheobronchoplasty with prolene mesh. Intra-op was uneventful and pt arrived to CTICU extubated. POD#0, CT was removed. POD#1, SQ emphysema was noted on exam, CXR remained stable and pt was transferred to . POD#2, Incisional decompression was made to chest for SQ air with improvement. POD#3, as per Dr. Goodson pt stable and ready for discharge home.

## 2018-03-15 NOTE — PROGRESS NOTE ADULT - SUBJECTIVE AND OBJECTIVE BOX
Patient discussed on morning rounds with Dr. Goodson     Operation / Date: Tracheobronchoplasty    Surgeon: Dr. Goodson     Referring Physician:     SUBJECTIVE ASSESSMENT:  47y Female no acute events overnight. Pt feels tired from lack of sleep and feels ready for discharge home.     Hospital Course:      Vital Signs Last 24 Hrs  T(C): 36.9 (15 Mar 2018 09:30), Max: 37.2 (14 Mar 2018 17:16)  T(F): 98.5 (15 Mar 2018 09:30), Max: 98.9 (14 Mar 2018 17:16)  HR: 76 (15 Mar 2018 08:31) (64 - 76)  BP: 126/72 (15 Mar 2018 08:31) (113/62 - 133/65)  BP(mean): 90 (15 Mar 2018 08:31) (81 - 90)  RR: 16 (15 Mar 2018 08:31) (16 - 18)  SpO2: 99% (15 Mar 2018 08:31) (96% - 99%)  I&O's Detail    15 Mar 2018 07:01  -  15 Mar 2018 10:17  --------------------------------------------------------  IN:    Oral Fluid: 240 mL  Total IN: 240 mL    OUT:  Total OUT: 0 mL    Total NET: 240 mL          EPICARDIAL WIRES REMOVED: Yes/No.  TIE DOWNS REMOVED: Yes/No.    PHYSICAL EXAM:    General:     Neurological:    Cardiovascular:    Respiratory:    Gastrointestinal:    Extremities:    Vascular:    Incision Sites:    LABS:                        12.2   7.8   )-----------( 279      ( 15 Mar 2018 05:49 )             37.2       COUMADIN:  Yes/No.        DOSE:                  INDICATION:                GOAL INR:        03-15    136  |  100  |  12  ----------------------------<  93  3.9   |  23  |  0.64    Ca    8.5      15 Mar 2018 05:49  Phos  3.4     03-14  Mg     2.1     03-15      MEDICATIONS  (STANDING):  BACItracin   Ointment 1 Application(s) Topical daily  benzonatate 100 milliGRAM(s) Oral every 8 hours  docusate sodium 100 milliGRAM(s) Oral three times a day  guaiFENesin ER 1200 milliGRAM(s) Oral every 12 hours  heparin  Injectable 5000 Unit(s) SubCutaneous every 8 hours  levothyroxine 25 MICROGram(s) Oral daily  linaclotide 290 MICROGram(s) Oral daily  pantoprazole    Tablet 40 milliGRAM(s) Oral before breakfast  polyethylene glycol 3350 17 Gram(s) Oral daily  senna 2 Tablet(s) Oral at bedtime  sodium chloride 0.9% lock flush 3 milliLiter(s) IV Push every 8 hours  trimethoprim   80 mG/sulfamethoxazole 400 mG 1 Tablet(s) Oral every 12 hours      Discharge CXR:    Discharge ECHO: Patient discussed on morning rounds with Dr. Goodson     Operation / Date: Tracheobronchoplasty    Surgeon: Dr. Goodson     SUBJECTIVE ASSESSMENT:  47y Female no acute events overnight. Pt feels tired from lack of sleep and feels ready for discharge home.     Hospital Course:  46 y/o F w/ PMH of pertussis, cough, SOB, originally referred by Dr. Jack Summers who presented for surgical correction of tracheomalacia. Pt's chief complaint was a dry non productive cough and repeated pulmonary infections which started approximately 2 years ago. Her cough had progressively worsened in the past few months and she was experiencing SOB on exertion. CT chest on 1/17/18 revealed: 70% narrowing of trachea on the expiratory phase, which is consistent with tracheomalacia, as well as a 3 mm RLL nodule noted.  Bronchoscopy 2/7/18 demonstrated that there was a 75% distal tracheal collapse on forced expiration, 90 % collapse of the left main bronchus, and 75% collapse of the right main bronchus with force exhalation. There was no significant collapse of the trachea with quiet breathing. She was given a trial of Elavil for the cough and is on 30 mg nightly. Pt was admitted on 3/12/18 for elective Tracheobronchoplasty with prolene mesh. Intra-op was uneventful and pt arrived to CTICU extubated. POD#0, CT was removed. POD#1, SQ emphysema was noted on exam, CXR remained stable and pt was transferred to . POD#2, Incisional decompression was made to chest for SQ air with improvement. POD#3, as per Dr. Goodson pt stable and ready for discharge home.     Of note, pt has nasal voice on day of discharge, denies SOB. Pt was examined by Dr. Goodson and Dr. Rojas and cleared for discharge. Pt and MDs report improvement of SQ emphysema on physical exam and discharge CXR was stable.       Vital Signs Last 24 Hrs  T(C): 36.9 (15 Mar 2018 09:30), Max: 37.2 (14 Mar 2018 17:16)  T(F): 98.5 (15 Mar 2018 09:30), Max: 98.9 (14 Mar 2018 17:16)  HR: 76 (15 Mar 2018 08:31) (64 - 76)  BP: 126/72 (15 Mar 2018 08:31) (113/62 - 133/65)  BP(mean): 90 (15 Mar 2018 08:31) (81 - 90)  RR: 16 (15 Mar 2018 08:31) (16 - 18)  SpO2: 99% (15 Mar 2018 08:31) (96% - 99%)  I&O's Detail    15 Mar 2018 07:01  -  15 Mar 2018 10:17  --------------------------------------------------------  IN:    Oral Fluid: 240 mL  Total IN: 240 mL    OUT:  Total OUT: 0 mL    Total NET: 240 mL      EPICARDIAL WIRES REMOVED: Yes  TIE DOWNS REMOVED: No.    PHYSICAL EXAM:    General: NAD, sitting upright in bed     Neurological: moving all extremities with no focal deficits     Cardiovascular: RRR, no m/r/g    Respiratory: CTA b/l, slight SQ emphysema to lateral neck (pt states much improved from previous)    Gastrointestinal: NT-ND, soft     Extremities: warm and well perfused no edema or calf tenderness b/l     Incision Sites: right VATS sites CDI, no erythema, ecchymosis or swelling. SQ emphysema right anterior decompression incisional dressing CDI, no swelling.     LABS:                        12.2   7.8   )-----------( 279      ( 15 Mar 2018 05:49 )             37.2       COUMADIN:  No.              03-15    136  |  100  |  12  ----------------------------<  93  3.9   |  23  |  0.64    Ca    8.5      15 Mar 2018 05:49  Phos  3.4     03-14  Mg     2.1     03-15      MEDICATIONS  (STANDING):  BACItracin   Ointment 1 Application(s) Topical daily  benzonatate 100 milliGRAM(s) Oral every 8 hours  docusate sodium 100 milliGRAM(s) Oral three times a day  guaiFENesin ER 1200 milliGRAM(s) Oral every 12 hours  heparin  Injectable 5000 Unit(s) SubCutaneous every 8 hours  levothyroxine 25 MICROGram(s) Oral daily  linaclotide 290 MICROGram(s) Oral daily  pantoprazole    Tablet 40 milliGRAM(s) Oral before breakfast  polyethylene glycol 3350 17 Gram(s) Oral daily  senna 2 Tablet(s) Oral at bedtime  sodium chloride 0.9% lock flush 3 milliLiter(s) IV Push every 8 hours  trimethoprim   80 mG/sulfamethoxazole 400 mG 1 Tablet(s) Oral every 12 hours      Discharge CXR:  < from: Xray Chest 1 View AP/PA (03.15.18 @ 05:29) >  Suggestion of slight improvement in subcutaneous emphysema with   persistent pneumomediastinum. No pneumothorax is identified.        D/C Instructions:  -Please follow up with Dr. Goodson on 3/19/18 at 10:30AM.  The office is located at [facility], [facility], 4th floor. Call us with any questions #[phone].  - Please make a follow-up appointment with your primary care provider within 1-2 weeks of discharge to discuss your recent surgery.  -Walk daily as tolerated and use your incentive spirometer every hour.  -No driving or strenuous activity/exercise for 6 weeks, or until cleared by your surgeon. Please do not lift anything greater than ten pounds.   -Gently clean your incisions with anti-bacterial soap and water, pat dry.  You may leave them open to air. Please keep your chest incision dressing intact until your follow-up appointment with Dr. Goodson.   -Call your doctor if you have shortness of breath, chest pain not relieved by pain medication, dizziness, fever >101.5, or increased redness or drainage from incisions, please go to your nearest ED if you experience increased subcutaneous air and swelling in the chest/neck or face, if you have difficulty breathing or voice changes.

## 2018-03-15 NOTE — DISCHARGE NOTE ADULT - PATIENT PORTAL LINK FT
You can access the [facility] Patient Portal, offered by [facility], by registering with the following website: [email or web address]

## 2018-03-16 ENCOUNTER — APPOINTMENT (OUTPATIENT)
Dept: PULMONOLOGY | Facility: CLINIC | Age: 48
End: 2018-03-16

## 2018-03-18 ENCOUNTER — FORM ENCOUNTER (OUTPATIENT)
Age: 48
End: 2018-03-18

## 2018-03-18 DIAGNOSIS — Z98.890 OTHER SPECIFIED POSTPROCEDURAL STATES: ICD-10-CM

## 2018-03-18 DIAGNOSIS — K21.9 GASTRO-ESOPHAGEAL REFLUX DISEASE WITHOUT ESOPHAGITIS: ICD-10-CM

## 2018-03-18 DIAGNOSIS — J39.8 OTHER SPECIFIED DISEASES OF UPPER RESPIRATORY TRACT: ICD-10-CM

## 2018-03-18 DIAGNOSIS — E03.9 HYPOTHYROIDISM, UNSPECIFIED: ICD-10-CM

## 2018-03-18 DIAGNOSIS — Z88.8 ALLERGY STATUS TO OTHER DRUGS, MEDICAMENTS AND BIOLOGICAL SUBSTANCES STATUS: ICD-10-CM

## 2018-03-18 DIAGNOSIS — K58.9 IRRITABLE BOWEL SYNDROME WITHOUT DIARRHEA: ICD-10-CM

## 2018-03-18 DIAGNOSIS — F41.9 ANXIETY DISORDER, UNSPECIFIED: ICD-10-CM

## 2018-03-18 DIAGNOSIS — Z88.6 ALLERGY STATUS TO ANALGESIC AGENT: ICD-10-CM

## 2018-03-18 DIAGNOSIS — R05 COUGH: ICD-10-CM

## 2018-03-18 DIAGNOSIS — Z88.0 ALLERGY STATUS TO PENICILLIN: ICD-10-CM

## 2018-03-19 ENCOUNTER — APPOINTMENT (OUTPATIENT)
Dept: THORACIC SURGERY | Facility: CLINIC | Age: 48
End: 2018-03-19
Payer: COMMERCIAL

## 2018-03-19 ENCOUNTER — OUTPATIENT (OUTPATIENT)
Dept: OUTPATIENT SERVICES | Facility: HOSPITAL | Age: 48
LOS: 1 days | End: 2018-03-19
Payer: COMMERCIAL

## 2018-03-19 VITALS
HEART RATE: 90 BPM | TEMPERATURE: 97.1 F | SYSTOLIC BLOOD PRESSURE: 137 MMHG | OXYGEN SATURATION: 95 % | DIASTOLIC BLOOD PRESSURE: 68 MMHG | RESPIRATION RATE: 19 BRPM

## 2018-03-19 DIAGNOSIS — Z98.890 OTHER SPECIFIED POSTPROCEDURAL STATES: Chronic | ICD-10-CM

## 2018-03-19 DIAGNOSIS — Z86.018 PERSONAL HISTORY OF OTHER BENIGN NEOPLASM: Chronic | ICD-10-CM

## 2018-03-19 DIAGNOSIS — Z98.49 CATARACT EXTRACTION STATUS, UNSPECIFIED EYE: Chronic | ICD-10-CM

## 2018-03-19 DIAGNOSIS — Z98.51 TUBAL LIGATION STATUS: Chronic | ICD-10-CM

## 2018-03-19 PROCEDURE — 99024 POSTOP FOLLOW-UP VISIT: CPT

## 2018-03-19 PROCEDURE — 71046 X-RAY EXAM CHEST 2 VIEWS: CPT | Mod: 26

## 2018-03-19 PROCEDURE — 71046 X-RAY EXAM CHEST 2 VIEWS: CPT

## 2018-03-25 ENCOUNTER — FORM ENCOUNTER (OUTPATIENT)
Age: 48
End: 2018-03-25

## 2018-03-26 ENCOUNTER — OUTPATIENT (OUTPATIENT)
Dept: OUTPATIENT SERVICES | Facility: HOSPITAL | Age: 48
LOS: 1 days | End: 2018-03-26
Payer: COMMERCIAL

## 2018-03-26 ENCOUNTER — APPOINTMENT (OUTPATIENT)
Dept: THORACIC SURGERY | Facility: CLINIC | Age: 48
End: 2018-03-26
Payer: COMMERCIAL

## 2018-03-26 VITALS
OXYGEN SATURATION: 100 % | HEART RATE: 66 BPM | SYSTOLIC BLOOD PRESSURE: 117 MMHG | RESPIRATION RATE: 18 BRPM | DIASTOLIC BLOOD PRESSURE: 68 MMHG | TEMPERATURE: 97.2 F

## 2018-03-26 DIAGNOSIS — Z98.890 OTHER SPECIFIED POSTPROCEDURAL STATES: Chronic | ICD-10-CM

## 2018-03-26 DIAGNOSIS — Z98.49 CATARACT EXTRACTION STATUS, UNSPECIFIED EYE: Chronic | ICD-10-CM

## 2018-03-26 DIAGNOSIS — Z98.51 TUBAL LIGATION STATUS: Chronic | ICD-10-CM

## 2018-03-26 DIAGNOSIS — Z86.018 PERSONAL HISTORY OF OTHER BENIGN NEOPLASM: Chronic | ICD-10-CM

## 2018-03-26 PROCEDURE — 71046 X-RAY EXAM CHEST 2 VIEWS: CPT | Mod: 26

## 2018-03-26 PROCEDURE — 99024 POSTOP FOLLOW-UP VISIT: CPT

## 2018-03-26 PROCEDURE — 71046 X-RAY EXAM CHEST 2 VIEWS: CPT

## 2018-04-02 RX ORDER — GUAIFENESIN AND CODEINE PHOSPHATE 10; 100 MG/5ML; MG/5ML
100-10 SOLUTION ORAL EVERY 6 HOURS
Qty: 140 | Refills: 0 | Status: DISCONTINUED | COMMUNITY
Start: 2018-03-26 | End: 2018-04-02

## 2018-04-04 ENCOUNTER — FORM ENCOUNTER (OUTPATIENT)
Age: 48
End: 2018-04-04

## 2018-04-04 PROBLEM — Z72.820 POOR SLEEP: Status: RESOLVED | Noted: 2018-01-11 | Resolved: 2018-04-04

## 2018-04-04 PROBLEM — T79.7XXA SUBCUTANEOUS EMPHYSEMA: Status: RESOLVED | Noted: 2018-03-19 | Resolved: 2018-04-04

## 2018-04-05 ENCOUNTER — OUTPATIENT (OUTPATIENT)
Dept: OUTPATIENT SERVICES | Facility: HOSPITAL | Age: 48
LOS: 1 days | End: 2018-04-05
Payer: COMMERCIAL

## 2018-04-05 ENCOUNTER — APPOINTMENT (OUTPATIENT)
Dept: THORACIC SURGERY | Facility: CLINIC | Age: 48
End: 2018-04-05
Payer: COMMERCIAL

## 2018-04-05 VITALS
HEART RATE: 78 BPM | OXYGEN SATURATION: 99 % | DIASTOLIC BLOOD PRESSURE: 68 MMHG | RESPIRATION RATE: 16 BRPM | TEMPERATURE: 97.5 F | SYSTOLIC BLOOD PRESSURE: 110 MMHG

## 2018-04-05 DIAGNOSIS — Z98.890 OTHER SPECIFIED POSTPROCEDURAL STATES: Chronic | ICD-10-CM

## 2018-04-05 DIAGNOSIS — Z98.51 TUBAL LIGATION STATUS: Chronic | ICD-10-CM

## 2018-04-05 DIAGNOSIS — Z98.49 CATARACT EXTRACTION STATUS, UNSPECIFIED EYE: Chronic | ICD-10-CM

## 2018-04-05 DIAGNOSIS — Z86.018 PERSONAL HISTORY OF OTHER BENIGN NEOPLASM: Chronic | ICD-10-CM

## 2018-04-05 DIAGNOSIS — Z72.820 SLEEP DEPRIVATION: ICD-10-CM

## 2018-04-05 DIAGNOSIS — T79.7XXA TRAUMATIC SUBCUTANEOUS EMPHYSEMA, INITIAL ENCOUNTER: ICD-10-CM

## 2018-04-05 PROCEDURE — 71046 X-RAY EXAM CHEST 2 VIEWS: CPT

## 2018-04-05 PROCEDURE — 99024 POSTOP FOLLOW-UP VISIT: CPT

## 2018-04-05 PROCEDURE — 71046 X-RAY EXAM CHEST 2 VIEWS: CPT | Mod: 26

## 2018-04-18 ENCOUNTER — FORM ENCOUNTER (OUTPATIENT)
Age: 48
End: 2018-04-18

## 2018-04-19 ENCOUNTER — OUTPATIENT (OUTPATIENT)
Dept: OUTPATIENT SERVICES | Facility: HOSPITAL | Age: 48
LOS: 1 days | End: 2018-04-19
Payer: COMMERCIAL

## 2018-04-19 ENCOUNTER — APPOINTMENT (OUTPATIENT)
Dept: THORACIC SURGERY | Facility: CLINIC | Age: 48
End: 2018-04-19
Payer: COMMERCIAL

## 2018-04-19 VITALS
SYSTOLIC BLOOD PRESSURE: 122 MMHG | OXYGEN SATURATION: 97 % | TEMPERATURE: 97.3 F | DIASTOLIC BLOOD PRESSURE: 60 MMHG | HEART RATE: 69 BPM | RESPIRATION RATE: 18 BRPM

## 2018-04-19 DIAGNOSIS — Z98.890 OTHER SPECIFIED POSTPROCEDURAL STATES: Chronic | ICD-10-CM

## 2018-04-19 DIAGNOSIS — Z09 ENCOUNTER FOR FOLLOW-UP EXAMINATION AFTER COMPLETED TREATMENT FOR CONDITIONS OTHER THAN MALIGNANT NEOPLASM: ICD-10-CM

## 2018-04-19 DIAGNOSIS — Z86.018 PERSONAL HISTORY OF OTHER BENIGN NEOPLASM: Chronic | ICD-10-CM

## 2018-04-19 DIAGNOSIS — Z98.49 CATARACT EXTRACTION STATUS, UNSPECIFIED EYE: Chronic | ICD-10-CM

## 2018-04-19 DIAGNOSIS — Z98.51 TUBAL LIGATION STATUS: Chronic | ICD-10-CM

## 2018-04-19 PROCEDURE — 71046 X-RAY EXAM CHEST 2 VIEWS: CPT | Mod: 26

## 2018-04-19 PROCEDURE — 71046 X-RAY EXAM CHEST 2 VIEWS: CPT

## 2018-04-19 PROCEDURE — 99024 POSTOP FOLLOW-UP VISIT: CPT

## 2018-04-19 RX ORDER — SULFAMETHOXAZOLE AND TRIMETHOPRIM 800; 160 MG/1; MG/1
800-160 TABLET ORAL
Qty: 7 | Refills: 0 | Status: COMPLETED | COMMUNITY
Start: 2018-03-15

## 2018-04-19 RX ORDER — BENZONATATE 100 MG/1
100 CAPSULE ORAL
Qty: 100 | Refills: 0 | Status: COMPLETED | COMMUNITY
Start: 2018-03-15

## 2018-04-30 ENCOUNTER — MEDICATION RENEWAL (OUTPATIENT)
Age: 48
End: 2018-04-30

## 2018-04-30 RX ORDER — AMITRIPTYLINE HYDROCHLORIDE 10 MG/1
10 TABLET, FILM COATED ORAL
Qty: 60 | Refills: 1 | Status: COMPLETED | COMMUNITY
Start: 2018-04-19 | End: 2018-04-30

## 2018-05-14 RX ORDER — GUAIFENESIN AND CODEINE PHOSPHATE 10; 100 MG/5ML; MG/5ML
100-10 SOLUTION ORAL
Qty: 1 | Refills: 0 | Status: COMPLETED | COMMUNITY
Start: 2018-04-02 | End: 2018-05-14

## 2018-05-15 ENCOUNTER — MEDICATION RENEWAL (OUTPATIENT)
Age: 48
End: 2018-05-15

## 2018-05-16 ENCOUNTER — MEDICATION RENEWAL (OUTPATIENT)
Age: 48
End: 2018-05-16

## 2018-06-06 ENCOUNTER — FORM ENCOUNTER (OUTPATIENT)
Age: 48
End: 2018-06-06

## 2018-06-07 ENCOUNTER — OUTPATIENT (OUTPATIENT)
Dept: OUTPATIENT SERVICES | Facility: HOSPITAL | Age: 48
LOS: 1 days | End: 2018-06-07
Payer: COMMERCIAL

## 2018-06-07 ENCOUNTER — APPOINTMENT (OUTPATIENT)
Dept: THORACIC SURGERY | Facility: CLINIC | Age: 48
End: 2018-06-07
Payer: COMMERCIAL

## 2018-06-07 VITALS
OXYGEN SATURATION: 98 % | SYSTOLIC BLOOD PRESSURE: 124 MMHG | HEART RATE: 70 BPM | DIASTOLIC BLOOD PRESSURE: 61 MMHG | RESPIRATION RATE: 19 BRPM | TEMPERATURE: 97.7 F

## 2018-06-07 DIAGNOSIS — Z98.890 OTHER SPECIFIED POSTPROCEDURAL STATES: Chronic | ICD-10-CM

## 2018-06-07 DIAGNOSIS — Z86.018 PERSONAL HISTORY OF OTHER BENIGN NEOPLASM: Chronic | ICD-10-CM

## 2018-06-07 DIAGNOSIS — Z98.49 CATARACT EXTRACTION STATUS, UNSPECIFIED EYE: Chronic | ICD-10-CM

## 2018-06-07 DIAGNOSIS — Z98.51 TUBAL LIGATION STATUS: Chronic | ICD-10-CM

## 2018-06-07 PROCEDURE — 71046 X-RAY EXAM CHEST 2 VIEWS: CPT

## 2018-06-07 PROCEDURE — 99024 POSTOP FOLLOW-UP VISIT: CPT

## 2018-06-07 PROCEDURE — 71046 X-RAY EXAM CHEST 2 VIEWS: CPT | Mod: 26

## 2018-06-18 ENCOUNTER — APPOINTMENT (OUTPATIENT)
Dept: PULMONOLOGY | Facility: CLINIC | Age: 48
End: 2018-06-18
Payer: COMMERCIAL

## 2018-06-18 ENCOUNTER — NON-APPOINTMENT (OUTPATIENT)
Age: 48
End: 2018-06-18

## 2018-06-18 VITALS
HEART RATE: 76 BPM | WEIGHT: 148 LBS | OXYGEN SATURATION: 98 % | DIASTOLIC BLOOD PRESSURE: 68 MMHG | BODY MASS INDEX: 25.27 KG/M2 | RESPIRATION RATE: 17 BRPM | SYSTOLIC BLOOD PRESSURE: 114 MMHG | HEIGHT: 64 IN

## 2018-06-18 PROCEDURE — 99214 OFFICE O/P EST MOD 30 MIN: CPT | Mod: 25

## 2018-06-18 PROCEDURE — 94618 PULMONARY STRESS TESTING: CPT

## 2018-06-18 PROCEDURE — 94010 BREATHING CAPACITY TEST: CPT | Mod: 59

## 2018-06-18 RX ORDER — AMITRIPTYLINE HYDROCHLORIDE 10 MG/1
10 TABLET, FILM COATED ORAL AT BEDTIME
Qty: 360 | Refills: 3 | Status: DISCONTINUED | COMMUNITY
Start: 2018-04-30 | End: 2018-06-18

## 2018-06-25 ENCOUNTER — APPOINTMENT (OUTPATIENT)
Dept: PULMONOLOGY | Facility: CLINIC | Age: 48
End: 2018-06-25

## 2018-07-05 ENCOUNTER — APPOINTMENT (OUTPATIENT)
Dept: THORACIC SURGERY | Facility: CLINIC | Age: 48
End: 2018-07-05
Payer: COMMERCIAL

## 2018-07-13 ENCOUNTER — APPOINTMENT (OUTPATIENT)
Dept: PULMONOLOGY | Facility: CLINIC | Age: 48
End: 2018-07-13

## 2018-07-19 ENCOUNTER — APPOINTMENT (OUTPATIENT)
Dept: THORACIC SURGERY | Facility: CLINIC | Age: 48
End: 2018-07-19
Payer: COMMERCIAL

## 2018-07-19 VITALS
DIASTOLIC BLOOD PRESSURE: 65 MMHG | TEMPERATURE: 97.2 F | OXYGEN SATURATION: 99 % | SYSTOLIC BLOOD PRESSURE: 124 MMHG | HEART RATE: 64 BPM | RESPIRATION RATE: 18 BRPM

## 2018-07-19 PROCEDURE — 99214 OFFICE O/P EST MOD 30 MIN: CPT

## 2018-07-27 ENCOUNTER — APPOINTMENT (OUTPATIENT)
Dept: PULMONOLOGY | Facility: CLINIC | Age: 48
End: 2018-07-27

## 2018-08-23 ENCOUNTER — OUTPATIENT (OUTPATIENT)
Dept: OUTPATIENT SERVICES | Facility: HOSPITAL | Age: 48
LOS: 1 days | End: 2018-08-23
Payer: SELF-PAY

## 2018-08-23 VITALS
DIASTOLIC BLOOD PRESSURE: 81 MMHG | WEIGHT: 170.64 LBS | OXYGEN SATURATION: 99 % | SYSTOLIC BLOOD PRESSURE: 118 MMHG | TEMPERATURE: 98 F | RESPIRATION RATE: 12 BRPM | HEART RATE: 68 BPM | HEIGHT: 64 IN

## 2018-08-23 VITALS — WEIGHT: 170.64 LBS | HEIGHT: 64 IN

## 2018-08-23 DIAGNOSIS — Z98.890 OTHER SPECIFIED POSTPROCEDURAL STATES: Chronic | ICD-10-CM

## 2018-08-23 DIAGNOSIS — Z41.1 ENCOUNTER FOR COSMETIC SURGERY: ICD-10-CM

## 2018-08-23 DIAGNOSIS — Z98.51 TUBAL LIGATION STATUS: Chronic | ICD-10-CM

## 2018-08-23 DIAGNOSIS — L91.8 OTHER HYPERTROPHIC DISORDERS OF THE SKIN: ICD-10-CM

## 2018-08-23 DIAGNOSIS — Z86.018 PERSONAL HISTORY OF OTHER BENIGN NEOPLASM: Chronic | ICD-10-CM

## 2018-08-23 DIAGNOSIS — Z98.49 CATARACT EXTRACTION STATUS, UNSPECIFIED EYE: Chronic | ICD-10-CM

## 2018-08-23 DIAGNOSIS — Z01.818 ENCOUNTER FOR OTHER PREPROCEDURAL EXAMINATION: ICD-10-CM

## 2018-08-23 DIAGNOSIS — E88.1 LIPODYSTROPHY, NOT ELSEWHERE CLASSIFIED: ICD-10-CM

## 2018-08-23 LAB
BLD GP AB SCN SERPL QL: SIGNIFICANT CHANGE UP
HCT VFR BLD CALC: 44.6 % — SIGNIFICANT CHANGE UP (ref 34.5–45)
HGB BLD-MCNC: 14.9 G/DL — SIGNIFICANT CHANGE UP (ref 11.5–15.5)
MCHC RBC-ENTMCNC: 30.5 PG — SIGNIFICANT CHANGE UP (ref 27–34)
MCHC RBC-ENTMCNC: 33.3 GM/DL — SIGNIFICANT CHANGE UP (ref 32–36)
MCV RBC AUTO: 91.4 FL — SIGNIFICANT CHANGE UP (ref 80–100)
PLATELET # BLD AUTO: 309 K/UL — SIGNIFICANT CHANGE UP (ref 150–400)
RBC # BLD: 4.88 M/UL — SIGNIFICANT CHANGE UP (ref 3.8–5.2)
RBC # FLD: 12.3 % — SIGNIFICANT CHANGE UP (ref 10.3–14.5)
WBC # BLD: 6.5 K/UL — SIGNIFICANT CHANGE UP (ref 3.8–10.5)
WBC # FLD AUTO: 6.5 K/UL — SIGNIFICANT CHANGE UP (ref 3.8–10.5)

## 2018-08-23 PROCEDURE — 86900 BLOOD TYPING SEROLOGIC ABO: CPT

## 2018-08-23 PROCEDURE — 93005 ELECTROCARDIOGRAM TRACING: CPT

## 2018-08-23 PROCEDURE — 86901 BLOOD TYPING SEROLOGIC RH(D): CPT

## 2018-08-23 PROCEDURE — G0463: CPT

## 2018-08-23 PROCEDURE — 86850 RBC ANTIBODY SCREEN: CPT

## 2018-08-23 PROCEDURE — 36415 COLL VENOUS BLD VENIPUNCTURE: CPT

## 2018-08-23 PROCEDURE — 85027 COMPLETE CBC AUTOMATED: CPT

## 2018-08-23 PROCEDURE — 93010 ELECTROCARDIOGRAM REPORT: CPT | Mod: NC

## 2018-08-23 NOTE — H&P PST ADULT - HISTORY OF PRESENT ILLNESS
47 y/o female presents to PST.  She states she wants her abdomen to be firmer.  Scheduled for abdominoplasty 9/4/18.

## 2018-08-23 NOTE — H&P PST ADULT - PSH
H/O arthroscopy of right knee    H/O bilateral breast reduction surgery    H/O tubal ligation  bilateral  History of abdominoplasty  2012, mini  History of bladder surgery  sling  History of carpal tunnel release  left  History of cataract surgery  bilateral  History of lipoma  removed from left shoulder  History of parotidectomy  left  Status post arthroscopy of hip  left  Status post right breast lumpectomy  1996

## 2018-08-23 NOTE — H&P PST ADULT - PMH
Anxiety    Chronic cough    Encounter for cosmetic surgery    Hypothyroidism    IBS (irritable bowel syndrome)    Tracheomalacia

## 2018-08-23 NOTE — H&P PST ADULT - FAMILY HISTORY
Father  Still living? No  Family history of heart disease, Age at diagnosis: Age Unknown  Family history of diabetes mellitus, Age at diagnosis: Age Unknown  Family history of hypertension, Age at diagnosis: Age Unknown     Mother  Still living? Yes, Estimated age: 61-70  Family history of diabetes mellitus, Age at diagnosis: Age Unknown

## 2018-09-03 ENCOUNTER — TRANSCRIPTION ENCOUNTER (OUTPATIENT)
Age: 48
End: 2018-09-03

## 2018-09-04 ENCOUNTER — OUTPATIENT (OUTPATIENT)
Dept: OUTPATIENT SERVICES | Facility: HOSPITAL | Age: 48
LOS: 1 days | End: 2018-09-04
Payer: SELF-PAY

## 2018-09-04 ENCOUNTER — RESULT REVIEW (OUTPATIENT)
Age: 48
End: 2018-09-04

## 2018-09-04 VITALS
SYSTOLIC BLOOD PRESSURE: 104 MMHG | HEART RATE: 77 BPM | TEMPERATURE: 97 F | DIASTOLIC BLOOD PRESSURE: 60 MMHG | RESPIRATION RATE: 16 BRPM | OXYGEN SATURATION: 95 %

## 2018-09-04 VITALS
RESPIRATION RATE: 16 BRPM | TEMPERATURE: 98 F | HEIGHT: 64 IN | OXYGEN SATURATION: 100 % | WEIGHT: 170.64 LBS | HEART RATE: 65 BPM | SYSTOLIC BLOOD PRESSURE: 114 MMHG | DIASTOLIC BLOOD PRESSURE: 76 MMHG

## 2018-09-04 DIAGNOSIS — Z98.51 TUBAL LIGATION STATUS: Chronic | ICD-10-CM

## 2018-09-04 DIAGNOSIS — Z98.890 OTHER SPECIFIED POSTPROCEDURAL STATES: Chronic | ICD-10-CM

## 2018-09-04 DIAGNOSIS — Z86.018 PERSONAL HISTORY OF OTHER BENIGN NEOPLASM: Chronic | ICD-10-CM

## 2018-09-04 DIAGNOSIS — E88.1 LIPODYSTROPHY, NOT ELSEWHERE CLASSIFIED: ICD-10-CM

## 2018-09-04 DIAGNOSIS — Z41.1 ENCOUNTER FOR COSMETIC SURGERY: ICD-10-CM

## 2018-09-04 DIAGNOSIS — Z98.49 CATARACT EXTRACTION STATUS, UNSPECIFIED EYE: Chronic | ICD-10-CM

## 2018-09-04 DIAGNOSIS — L91.8 OTHER HYPERTROPHIC DISORDERS OF THE SKIN: ICD-10-CM

## 2018-09-04 PROCEDURE — 15830 EXC EXCESSIVE SKIN ABDOMEN: CPT

## 2018-09-04 RX ORDER — SODIUM CHLORIDE 9 MG/ML
1000 INJECTION, SOLUTION INTRAVENOUS
Qty: 0 | Refills: 0 | Status: DISCONTINUED | OUTPATIENT
Start: 2018-09-04 | End: 2018-09-04

## 2018-09-04 RX ORDER — HYDROMORPHONE HYDROCHLORIDE 2 MG/ML
0.5 INJECTION INTRAMUSCULAR; INTRAVENOUS; SUBCUTANEOUS
Qty: 0 | Refills: 0 | Status: DISCONTINUED | OUTPATIENT
Start: 2018-09-04 | End: 2018-09-04

## 2018-09-04 RX ORDER — OXYCODONE AND ACETAMINOPHEN 5; 325 MG/1; MG/1
1 TABLET ORAL EVERY 4 HOURS
Qty: 0 | Refills: 0 | Status: DISCONTINUED | OUTPATIENT
Start: 2018-09-04 | End: 2018-09-04

## 2018-09-04 RX ORDER — ACETAMINOPHEN 500 MG
1 TABLET ORAL
Qty: 0 | Refills: 0 | COMMUNITY

## 2018-09-04 RX ORDER — ACETAMINOPHEN 500 MG
650 TABLET ORAL EVERY 6 HOURS
Qty: 0 | Refills: 0 | Status: DISCONTINUED | OUTPATIENT
Start: 2018-09-04 | End: 2018-09-19

## 2018-09-04 RX ORDER — ONDANSETRON 8 MG/1
4 TABLET, FILM COATED ORAL ONCE
Qty: 0 | Refills: 0 | Status: DISCONTINUED | OUTPATIENT
Start: 2018-09-04 | End: 2018-09-04

## 2018-09-04 RX ADMIN — OXYCODONE AND ACETAMINOPHEN 1 TABLET(S): 5; 325 TABLET ORAL at 17:14

## 2018-09-04 RX ADMIN — SODIUM CHLORIDE 50 MILLILITER(S): 9 INJECTION, SOLUTION INTRAVENOUS at 08:57

## 2018-09-04 RX ADMIN — HYDROMORPHONE HYDROCHLORIDE 0.5 MILLIGRAM(S): 2 INJECTION INTRAMUSCULAR; INTRAVENOUS; SUBCUTANEOUS at 14:07

## 2018-09-04 RX ADMIN — HYDROMORPHONE HYDROCHLORIDE 0.5 MILLIGRAM(S): 2 INJECTION INTRAMUSCULAR; INTRAVENOUS; SUBCUTANEOUS at 14:17

## 2018-09-04 RX ADMIN — OXYCODONE AND ACETAMINOPHEN 1 TABLET(S): 5; 325 TABLET ORAL at 16:42

## 2018-09-04 NOTE — ASU PREOP CHECKLIST - DENTURES
pt alert and awake x3, arrived with feelings of anxiety. states gets shaky and heart palpitations while driving and at night, has stress in her life. denies chest pain, denies SI/HI
no

## 2018-09-04 NOTE — ASU PATIENT PROFILE, ADULT - PSH
H/O arthroscopy of right knee    H/O bilateral breast reduction surgery    H/O tubal ligation  bilateral  History of abdominoplasty  2012, mini  History of bladder surgery  sling  History of carpal tunnel release  left  History of cataract surgery  bilateral  History of lipoma  removed from left shoulder  History of parotidectomy  left  Status post arthroscopy of hip  left  Status post right breast lumpectomy  1996 H/O arthroscopy of right knee    H/O bilateral breast reduction surgery    H/O tubal ligation  bilateral  History of abdominoplasty  2012, mini  History of bladder surgery  sling  History of carpal tunnel release  left  History of cataract surgery  bilateral  History of lipoma  removed from left shoulder  History of parotidectomy  left  S/P tracheoplasty    Status post arthroscopy of hip  left  Status post right breast lumpectomy  1996

## 2018-09-04 NOTE — ASU DISCHARGE PLAN (ADULT/PEDIATRIC). - MEDICATION SUMMARY - MEDICATIONS TO TAKE
I will START or STAY ON the medications listed below when I get home from the hospital:    Advil  -- 1 tab(s) by mouth , As Needed  -- Indication: For home    Percocet 5/325 oral tablet  -- 1 tab(s) by mouth every 8 hours -for moderate pain - for severe pain MDD:3  -- Caution federal law prohibits the transfer of this drug to any person other  than the person for whom it was prescribed.  May cause drowsiness.  Alcohol may intensify this effect.  Use care when operating dangerous machinery.  This prescription cannot be refilled.  This product contains acetaminophen.  Do not use  with any other product containing acetaminophen to prevent possible liver damage.  Using more of this medication than prescribed may cause serious breathing problems.    -- Indication: For post op pain    Xanax 0.5 mg oral tablet  -- 1 tab(s) by mouth 3 times a day, As Needed MDD:3 tablets  -- Indication: For Encounter for cosmetic surgery    temazepam 30 mg oral capsule  -- 1 cap(s) by mouth once a day (at bedtime) MDD:1 capsule  -- Indication: For home    Linzess 290 mcg oral capsule  -- 1 cap(s) by mouth once a day  -- Indication: For home    levothyroxine 25 mcg (0.025 mg) oral tablet  -- 1 tab(s) by mouth once a day  -- Indication: For home    Vitamin D2 50,000 intl units (1.25 mg) oral capsule  -- 1 cap(s) by mouth every 3 to 4 weeks  -- Indication: For home

## 2018-09-04 NOTE — ASU PATIENT PROFILE, ADULT - VISION (WITH CORRECTIVE LENSES IF THE PATIENT USUALLY WEARS THEM):
reading and distance glasses needed/Normal vision: sees adequately in most situations; can see medication labels, newsprint

## 2018-09-10 LAB — SURGICAL PATHOLOGY STUDY: SIGNIFICANT CHANGE UP

## 2018-09-18 ENCOUNTER — APPOINTMENT (OUTPATIENT)
Dept: PULMONOLOGY | Facility: CLINIC | Age: 48
End: 2018-09-18

## 2018-09-18 DIAGNOSIS — Z87.898 PERSONAL HISTORY OF OTHER SPECIFIED CONDITIONS: ICD-10-CM

## 2018-11-22 ENCOUNTER — FORM ENCOUNTER (OUTPATIENT)
Age: 48
End: 2018-11-22

## 2018-11-23 ENCOUNTER — APPOINTMENT (OUTPATIENT)
Dept: RADIOLOGY | Facility: HOSPITAL | Age: 48
End: 2018-11-23

## 2018-11-23 ENCOUNTER — OUTPATIENT (OUTPATIENT)
Dept: OUTPATIENT SERVICES | Facility: HOSPITAL | Age: 48
LOS: 1 days | End: 2018-11-23
Payer: COMMERCIAL

## 2018-11-23 DIAGNOSIS — Z98.890 OTHER SPECIFIED POSTPROCEDURAL STATES: Chronic | ICD-10-CM

## 2018-11-23 DIAGNOSIS — K21.9 GASTRO-ESOPHAGEAL REFLUX DISEASE WITHOUT ESOPHAGITIS: ICD-10-CM

## 2018-11-23 DIAGNOSIS — Z86.018 PERSONAL HISTORY OF OTHER BENIGN NEOPLASM: Chronic | ICD-10-CM

## 2018-11-23 DIAGNOSIS — Z98.51 TUBAL LIGATION STATUS: Chronic | ICD-10-CM

## 2018-11-23 DIAGNOSIS — Z98.49 CATARACT EXTRACTION STATUS, UNSPECIFIED EYE: Chronic | ICD-10-CM

## 2018-11-23 PROCEDURE — 74220 X-RAY XM ESOPHAGUS 1CNTRST: CPT

## 2018-11-23 PROCEDURE — 74241: CPT | Mod: 26

## 2018-11-23 PROCEDURE — 74241: CPT

## 2018-11-30 NOTE — BRIEF OPERATIVE NOTE - IV INFUSIONS - CRYSTALLOIDS
A Randomized, Double-blind, Placebo-controlled Study to Assess the Effects of Bempedoic Acid (ETC-1002) on the Occurrence of Major Cardiovascular Events in Patients with, or at high risk for, Cardiovascular Disease who are Statin Intolerant    Bempedoic acid is a first-in-class small molecule inhibitor of adenosine triphosphate (ATP) - citrate lyase (ACL), an enzyme upstream of HMG-CoA reductase (molecular target of statins) in the cholesterol biosynthesis pathway. Bempedoic acid decreases cholesterol synthesis in liver leading to increased LDL receptor (LDLR) expression and LDL particle clearance from the blood. Therefore, inhibition of ACL by [id number] reduces LDL-C via the same pathway as HMG-CoA reductase inhibition by statins.    An important differentiating feature of bempedoic acid is that, unlike statins, it does not inhibit cholesterol synthesis in skeletal muscle. Therefore, it is not expected to cause the adverse effects associated with inhibition of the cholesterol biosynthesis pathway in skeletal muscle.     The primary objective of this trial is to evaluate whether long-term treatment with bempedoic acid 180 mg/day versus placebo reduces the risk of major adverse cardiovascular events (MACE) in patients with, or at high risk for, cardiovascular disease (CVD) who are statin intolerant. This superiority study will test the hypothesis that bempedoic acid, compared with placebo, will reduce the risk of CV events in patients with, or at high risk for, CVD who are statin intolerant.    It is estimated the mean treatment duration will be approximately 42 months (3.5 years), with all patients remaining in the study for a minimum of 24 months (2 years) and some patients remaining in the study for up to approximately 57.5 months (4.75 years).    If you have questions regarding this study or this subject's participation, call [facility] Clinical Research at [phone] between the hours of 8am-430pm. For  emergency information outside of these hours, call KAMARI Lew, CNP (Director of Research) at [phone] or Ita Adame MS (Manager of Research) at [phone].    VISIT SUMMARY:    Patient presents for SCREENING / ENROLLMENT visit for the CLEAR trial.  The subject was initially contacted by telephone for potential trial participation after the investigative staff determined that the subject meets the initial inclusion and exclusion criteria.  The study was explained in detail including the protocol concept and all the contents of the informed consent document.  The subject was given adequate time to read the informed consent document and the opportunity to discuss it.  The subject was encouraged to ask questions and all questions were answered to the subject’s satisfaction.  After going over the informed consent and answering all questions, the subject did elect to participate in the CLEAR trial and signed the informed consent.  A copy of the signed informed consent was given to the subject.  The informed consent was signed prior to any protocol procedures being performed.      Vital were obtained without complications.  A blood and urine sample was obtained from the patient without complications and sent to [facility] for processing.  All required procedures were completed.  Subject’s next visit has been scheduled for pending her labs today.    Patient was thanked for her participation in research.   100

## 2018-12-20 ENCOUNTER — APPOINTMENT (OUTPATIENT)
Dept: THORACIC SURGERY | Facility: CLINIC | Age: 48
End: 2018-12-20
Payer: COMMERCIAL

## 2018-12-20 ENCOUNTER — TRANSCRIPTION ENCOUNTER (OUTPATIENT)
Age: 48
End: 2018-12-20

## 2018-12-20 VITALS
OXYGEN SATURATION: 97 % | TEMPERATURE: 97.2 F | DIASTOLIC BLOOD PRESSURE: 59 MMHG | HEART RATE: 80 BPM | RESPIRATION RATE: 19 BRPM | SYSTOLIC BLOOD PRESSURE: 116 MMHG

## 2018-12-20 PROCEDURE — 99213 OFFICE O/P EST LOW 20 MIN: CPT

## 2019-01-09 NOTE — BRIEF OPERATIVE NOTE - OPERATION/FINDINGS
[Good] : ~his/her~  mood as  good [] : No [No falls in past year] : Patient reported no falls in the past year [0] : 2) Feeling down, depressed, or hopeless: Not at all (0) [PSD9Cuqmc] : 0 [Patient reported PAP Smear was normal] : Patient reported PAP Smear was normal [HIV Test offered] : HIV Test offered [Hepatitis C test offered] : Hepatitis C test offered [Change in mental status noted] : No change in mental status noted see operative report [None] : None [With Family] : lives with family [Employed] : employed [# Of Children ___] : has [unfilled] children [Feels Safe at Home] : Feels safe at home [Fully functional (bathing, dressing, toileting, transferring, walking, feeding)] : Fully functional (bathing, dressing, toileting, transferring, walking, feeding) [Fully functional (using the telephone, shopping, preparing meals, housekeeping, doing laundry, using] : Fully functional and needs no help or supervision to perform IADLs (using the telephone, shopping, preparing meals, housekeeping, doing laundry, using transportation, managing medications and managing finances) [Smoke Detector] : smoke detector [Seat Belt] :  uses seat belt [PapSmearDate] : 2017 [de-identified] : with daughter [de-identified] : BENNY [Discussed at today's visit] : Advance Directives Discussed at today's visit [FreeTextEntry4] : none

## 2019-01-14 ENCOUNTER — APPOINTMENT (OUTPATIENT)
Dept: GASTROENTEROLOGY | Facility: CLINIC | Age: 49
End: 2019-01-14
Payer: COMMERCIAL

## 2019-01-14 VITALS
OXYGEN SATURATION: 99 % | DIASTOLIC BLOOD PRESSURE: 80 MMHG | TEMPERATURE: 97.9 F | SYSTOLIC BLOOD PRESSURE: 120 MMHG | HEART RATE: 69 BPM | HEIGHT: 64 IN

## 2019-01-14 DIAGNOSIS — Z12.11 ENCOUNTER FOR SCREENING FOR MALIGNANT NEOPLASM OF COLON: ICD-10-CM

## 2019-01-14 PROCEDURE — 99244 OFF/OP CNSLTJ NEW/EST MOD 40: CPT

## 2019-01-14 RX ORDER — GABAPENTIN 100 MG/1
100 CAPSULE ORAL 3 TIMES DAILY
Qty: 90 | Refills: 0 | Status: COMPLETED | COMMUNITY
Start: 2018-06-07 | End: 2019-01-14

## 2019-01-14 RX ORDER — MONTELUKAST SODIUM 10 MG/1
10 TABLET, FILM COATED ORAL
Qty: 1 | Refills: 1 | Status: COMPLETED | COMMUNITY
Start: 2018-06-18 | End: 2019-01-14

## 2019-01-14 RX ORDER — GUAIFENESIN AND CODEINE PHOSPHATE 10; 100 MG/5ML; MG/5ML
100-10 SOLUTION ORAL AS DIRECTED
Qty: 150 | Refills: 0 | Status: COMPLETED | COMMUNITY
Start: 2018-05-16 | End: 2019-01-14

## 2019-01-14 RX ORDER — DESLORATADINE 5 MG/1
5 TABLET, FILM COATED ORAL
Qty: 1 | Refills: 1 | Status: COMPLETED | COMMUNITY
Start: 2018-06-18 | End: 2019-01-14

## 2019-01-14 RX ORDER — ALPRAZOLAM 1 MG/1
1 TABLET ORAL
Refills: 0 | Status: ACTIVE | COMMUNITY
Start: 2019-01-14

## 2019-01-15 NOTE — CONSULT LETTER
[Dear  ___] : Dear  [unfilled], [Consult Letter:] : I had the pleasure of evaluating your patient, [unfilled]. [Please see my note below.] : Please see my note below. [Consult Closing:] : Thank you very much for allowing me to participate in the care of this patient.  If you have any questions, please do not hesitate to contact me. [Sincerely,] : Sincerely, [FreeTextEntry2] : Dr Nathaniel Díaz [FreeTextEntry3] : Dasha BALDERASBenita Melara\par  of Medicine\par Director of Gastrointestinal Motility, [facility] \par Division of Gastroenterology\par [facility]/[address] [place] [address], [address]\[place] [address]\par Tel: [phone]\par Motility Appts-Motility Coordinator: Rina Louis: [phone]\par

## 2019-01-15 NOTE — ASSESSMENT
[FreeTextEntry1] : 48-year-old female with history of tracheomalacia status post tracheobronchoplasty in 2018, now with oropharyngeal dysphagia, severe coughing with ingestion of solids, no issues with liquids, as well as esophageal dysphagia and chest pressure with solid food, concerning for esophageal dysmotility. I have scheduled the patient for esophageal manometry to rule out esophageal dysmotility disorder such as ineffective motility versus esophageal spasm. I have also referred the patient for modified barium swallow to evaluate for oral pharyngeal dysphagia, and may refer her to speech and swallow for further evaluation.   The risks, benefits and alternatives of the procedure were discussed with the patient in detail. Risks include nasal irritation, bleeding, coughing, sore throat and sinusitis. All questions were answered. The patient expressed understanding of these risks and is agreeable to proceed. \par  \par In terms of colon cancer screening, patient emphatically refuses colonoscopy at this time. We discussed other screening modality such a stool for testing, and we will order stool fit testing for her as a noninvasive screening modality at this time.

## 2019-01-15 NOTE — HISTORY OF PRESENT ILLNESS
[de-identified] : 48-year-old female with history of chronic cough since 2016, at which time she was diagnosed with initially pertussis then meningococcal pneumonia, subsequently with tracheomalacia and status post tracheobronchoplasty and 2018. Subsequent to that surgery, patient's chronic cough had subsided, now she is having coughing with the ingestion of solids. Between November and now, patient had 4 very violent episodes of coughing and choking after the ingestion of solid food. She underwent upper GI series in November 2018, which demonstrated mild transient delay of the pill in the esophagus. There was no gastroesophageal reflux seen. Patient has never had cervical dysphagia prior to her surgery.\par \par On GI review of systems, patient also endorses dysphagia at the level of the mid sternum.  She denies any nausea or vomiting, diarrhea. She has chronic constipation, for which she is taking Linzess 290.  Patient denies any classic heartburn symptoms. Patient states she has never had an upper endoscopy, nor a colonoscopy.\par \par Patient's family history significant for stomach cancer in the maternal grandmother. Paternal grandmother had breast cancer.

## 2019-01-15 NOTE — PHYSICAL EXAM
[General Appearance - Alert] : alert [General Appearance - In No Acute Distress] : in no acute distress [Sclera] : the sclera and conjunctiva were normal [PERRL With Normal Accommodation] : pupils were equal in size, round, and reactive to light [Extraocular Movements] : extraocular movements were intact [Outer Ear] : the ears and nose were normal in appearance [Oropharynx] : the oropharynx was normal [Neck Appearance] : the appearance of the neck was normal [Neck Cervical Mass (___cm)] : no neck mass was observed [Jugular Venous Distention Increased] : there was no jugular-venous distention [Thyroid Diffuse Enlargement] : the thyroid was not enlarged [Thyroid Nodule] : there were no palpable thyroid nodules [Auscultation Breath Sounds / Voice Sounds] : lungs were clear to auscultation bilaterally [Heart Rate And Rhythm] : heart rate was normal and rhythm regular [Heart Sounds] : normal S1 and S2 [Heart Sounds Gallop] : no gallops [Murmurs] : no murmurs [Heart Sounds Pericardial Friction Rub] : no pericardial rub [Bowel Sounds] : normal bowel sounds [Abdomen Soft] : soft [Abdomen Tenderness] : non-tender [Abdomen Mass (___ Cm)] : no abdominal mass palpated [FreeTextEntry1] : abdominoplasty [No CVA Tenderness] : no ~M costovertebral angle tenderness [No Spinal Tenderness] : no spinal tenderness [Abnormal Walk] : normal gait [Nail Clubbing] : no clubbing  or cyanosis of the fingernails [Musculoskeletal - Swelling] : no joint swelling seen [Motor Tone] : muscle strength and tone were normal [Skin Color & Pigmentation] : normal skin color and pigmentation [Skin Turgor] : normal skin turgor [] : no rash [Deep Tendon Reflexes (DTR)] : deep tendon reflexes were 2+ and symmetric [Sensation] : the sensory exam was normal to light touch and pinprick [No Focal Deficits] : no focal deficits [Oriented To Time, Place, And Person] : oriented to person, place, and time [Impaired Insight] : insight and judgment were intact [Affect] : the affect was normal

## 2019-01-30 ENCOUNTER — OUTPATIENT (OUTPATIENT)
Dept: OUTPATIENT SERVICES | Facility: HOSPITAL | Age: 49
LOS: 1 days | Discharge: HOME | End: 2019-01-30
Payer: COMMERCIAL

## 2019-01-30 ENCOUNTER — APPOINTMENT (OUTPATIENT)
Dept: GASTROENTEROLOGY | Facility: HOSPITAL | Age: 49
End: 2019-01-30

## 2019-01-30 DIAGNOSIS — Z98.890 OTHER SPECIFIED POSTPROCEDURAL STATES: Chronic | ICD-10-CM

## 2019-01-30 DIAGNOSIS — Z98.51 TUBAL LIGATION STATUS: Chronic | ICD-10-CM

## 2019-01-30 DIAGNOSIS — Z86.018 PERSONAL HISTORY OF OTHER BENIGN NEOPLASM: Chronic | ICD-10-CM

## 2019-01-30 DIAGNOSIS — Z98.49 CATARACT EXTRACTION STATUS, UNSPECIFIED EYE: Chronic | ICD-10-CM

## 2019-01-30 DIAGNOSIS — K21.9 GASTRO-ESOPHAGEAL REFLUX DISEASE WITHOUT ESOPHAGITIS: ICD-10-CM

## 2019-01-30 PROCEDURE — 91037 ESOPH IMPED FUNCTION TEST: CPT | Mod: 26,GC

## 2019-01-30 PROCEDURE — 91010 ESOPHAGUS MOTILITY STUDY: CPT | Mod: 26,GC

## 2019-02-21 ENCOUNTER — APPOINTMENT (OUTPATIENT)
Dept: NEUROLOGY | Facility: CLINIC | Age: 49
End: 2019-02-21

## 2019-02-24 ENCOUNTER — FORM ENCOUNTER (OUTPATIENT)
Age: 49
End: 2019-02-24

## 2019-02-25 ENCOUNTER — OUTPATIENT (OUTPATIENT)
Dept: OUTPATIENT SERVICES | Facility: HOSPITAL | Age: 49
LOS: 1 days | End: 2019-02-25

## 2019-02-25 ENCOUNTER — OUTPATIENT (OUTPATIENT)
Dept: OUTPATIENT SERVICES | Facility: HOSPITAL | Age: 49
LOS: 1 days | Discharge: HOME | End: 2019-02-25

## 2019-02-25 ENCOUNTER — APPOINTMENT (OUTPATIENT)
Dept: RADIOLOGY | Facility: HOSPITAL | Age: 49
End: 2019-02-25
Payer: COMMERCIAL

## 2019-02-25 ENCOUNTER — APPOINTMENT (OUTPATIENT)
Dept: SPEECH THERAPY | Facility: HOSPITAL | Age: 49
End: 2019-02-25
Payer: COMMERCIAL

## 2019-02-25 DIAGNOSIS — Z98.51 TUBAL LIGATION STATUS: Chronic | ICD-10-CM

## 2019-02-25 DIAGNOSIS — Z98.890 OTHER SPECIFIED POSTPROCEDURAL STATES: Chronic | ICD-10-CM

## 2019-02-25 DIAGNOSIS — Z86.018 PERSONAL HISTORY OF OTHER BENIGN NEOPLASM: Chronic | ICD-10-CM

## 2019-02-25 DIAGNOSIS — R05 COUGH: ICD-10-CM

## 2019-02-25 DIAGNOSIS — Z98.49 CATARACT EXTRACTION STATUS, UNSPECIFIED EYE: Chronic | ICD-10-CM

## 2019-02-25 PROCEDURE — 74230 X-RAY XM SWLNG FUNCJ C+: CPT | Mod: 26

## 2019-02-27 DIAGNOSIS — R13.10 DYSPHAGIA, UNSPECIFIED: ICD-10-CM

## 2019-02-28 ENCOUNTER — APPOINTMENT (OUTPATIENT)
Dept: GASTROENTEROLOGY | Facility: CLINIC | Age: 49
End: 2019-02-28
Payer: COMMERCIAL

## 2019-02-28 VITALS
HEART RATE: 68 BPM | HEIGHT: 64 IN | DIASTOLIC BLOOD PRESSURE: 70 MMHG | TEMPERATURE: 97.7 F | RESPIRATION RATE: 14 BRPM | OXYGEN SATURATION: 98 % | SYSTOLIC BLOOD PRESSURE: 120 MMHG

## 2019-02-28 PROCEDURE — 99214 OFFICE O/P EST MOD 30 MIN: CPT

## 2019-02-28 NOTE — ASSESSMENT
[FreeTextEntry1] : 48-year-old female with history of tracheomalacia status post tracheobronchoplasty in 2018, now with oropharyngeal dysphagia, severe coughing with ingestion of solids, no issues with liquids, as well as esophageal dysphagia and chest pressure with solid food, With an abnormal esophageal motility that does not fulfill any singular criteria for esophageal motility abnormalities as per [place] classification Versed 3.0. I have recommended that this patient undergo an upper endoscopy with biopsies to rule out infiltrative diseases of the esophagus such as eosinophilic esophagitis, stricture, and we will perform an EndoFlip that day to measure the elasticity of her esophagus, to assess LES distensibility. If her LES distensibility index is abnormally high, and we will plan on placing a bravo pH capsule to rule out GERD.

## 2019-02-28 NOTE — HISTORY OF PRESENT ILLNESS
[de-identified] : 48-year-old female status post tracheobronchoplasty in 2018 for chronic cough, here for followup care. Patient initially saw me one month ago for coffee with the ingestion of solids, abnormal upper GI series with mild transient delay of barium tablet in the lower esophagus. Patient states that prior to her surgery she has never had cervical dysphagia. Patient underwent esophageal motility study which was abnormal, multiple swallows demonstrated ineffective esophageal motility, with some panesophageal pressurization, LES overall did seem to relax.\par \par (last visit- Jan 2019)\par 48-year-old female with history of chronic cough since 2016, at which time she was diagnosed with initially pertussis then meningococcal pneumonia, subsequently with tracheomalacia and status post tracheobronchoplasty and 2018. Subsequent to that surgery, patient's chronic cough had subsided, now she is having coughing with the ingestion of solids. Between November and now, patient had 4 very violent episodes of coughing and choking after the ingestion of solid food. She underwent upper GI series in November 2018, which demonstrated mild transient delay of the pill in the esophagus. There was no gastroesophageal reflux seen. Patient has never had cervical dysphagia prior to her surgery.\par \par On GI review of systems, patient also endorses dysphagia at the level of the mid sternum.  She denies any nausea or vomiting, diarrhea. She has chronic constipation, for which she is taking Linzess 290.  Patient denies any classic heartburn symptoms. Patient states she has never had an upper endoscopy, nor a colonoscopy.\par \par Patient's family history significant for stomach cancer in the maternal grandmother. Paternal grandmother had breast cancer.

## 2019-02-28 NOTE — PHYSICAL EXAM

## 2019-03-02 ENCOUNTER — TRANSCRIPTION ENCOUNTER (OUTPATIENT)
Age: 49
End: 2019-03-02

## 2019-03-06 ENCOUNTER — OTHER (OUTPATIENT)
Age: 49
End: 2019-03-06

## 2019-03-18 ENCOUNTER — OTHER (OUTPATIENT)
Age: 49
End: 2019-03-18

## 2019-03-25 ENCOUNTER — CLINICAL ADVICE (OUTPATIENT)
Age: 49
End: 2019-03-25

## 2019-03-29 ENCOUNTER — APPOINTMENT (OUTPATIENT)
Dept: GASTROENTEROLOGY | Facility: HOSPITAL | Age: 49
End: 2019-03-29

## 2019-03-29 ENCOUNTER — OUTPATIENT (OUTPATIENT)
Dept: OUTPATIENT SERVICES | Facility: HOSPITAL | Age: 49
LOS: 1 days | Discharge: HOME | End: 2019-03-29
Payer: COMMERCIAL

## 2019-03-29 ENCOUNTER — RESULT REVIEW (OUTPATIENT)
Age: 49
End: 2019-03-29

## 2019-03-29 DIAGNOSIS — Z98.890 OTHER SPECIFIED POSTPROCEDURAL STATES: Chronic | ICD-10-CM

## 2019-03-29 DIAGNOSIS — Z98.49 CATARACT EXTRACTION STATUS, UNSPECIFIED EYE: Chronic | ICD-10-CM

## 2019-03-29 DIAGNOSIS — Z86.018 PERSONAL HISTORY OF OTHER BENIGN NEOPLASM: Chronic | ICD-10-CM

## 2019-03-29 DIAGNOSIS — R13.19 OTHER DYSPHAGIA: ICD-10-CM

## 2019-03-29 DIAGNOSIS — Z98.51 TUBAL LIGATION STATUS: Chronic | ICD-10-CM

## 2019-03-29 PROCEDURE — 91040 ESOPH BALLOON DISTENSION TST: CPT | Mod: 26,GC

## 2019-03-29 PROCEDURE — 88305 TISSUE EXAM BY PATHOLOGIST: CPT | Mod: 26

## 2019-03-29 PROCEDURE — 43239 EGD BIOPSY SINGLE/MULTIPLE: CPT | Mod: GC

## 2019-03-29 PROCEDURE — 88312 SPECIAL STAINS GROUP 1: CPT | Mod: 26

## 2019-03-31 PROCEDURE — 91035 G-ESOPH REFLX TST W/ELECTROD: CPT | Mod: 26

## 2019-04-02 LAB — SURGICAL PATHOLOGY STUDY: SIGNIFICANT CHANGE UP

## 2019-04-08 ENCOUNTER — APPOINTMENT (OUTPATIENT)
Dept: GASTROENTEROLOGY | Facility: CLINIC | Age: 49
End: 2019-04-08
Payer: COMMERCIAL

## 2019-04-08 VITALS
HEIGHT: 64 IN | HEART RATE: 76 BPM | RESPIRATION RATE: 15 BRPM | OXYGEN SATURATION: 99 % | TEMPERATURE: 98 F | SYSTOLIC BLOOD PRESSURE: 130 MMHG | DIASTOLIC BLOOD PRESSURE: 78 MMHG

## 2019-04-08 PROCEDURE — 99215 OFFICE O/P EST HI 40 MIN: CPT

## 2019-04-08 NOTE — HISTORY OF PRESENT ILLNESS
[de-identified] : 48-year-old female with chronic cough status post tracheobronchial plasty and 2018 here for followup care. Patient was initially complaining of coughing with the ingestion of solids, and abnormal upper GI series with mild transient delay of the barium tablet in the lower esophagus. Patient underwent modified barium swallow with different textures, patient demonstrated no signs of laryngeal penetration or aspiration, this was a thin and thick liquids as well as puréed and solids. Despite this, patient is still complaining of severe coughing with and without ingestion of solid food. Patient also complains of severe dysphagia. \par \par Patient's manometry study with borderline demonstrating motility abnormalities not classifiable by [facility] classification scheme, as 33% of swallows demonstrated pan-esophageal pressurization, other swallows demonstrated ineffective esophageal motility, LES pressures were borderline relaxation was overall pressure of 14.4. Patient then underwent upper endoscopy with EndoFlip with me on March 29, findings were significant by large gastric inlet patch in the upper esophagus, LA grade A esophagitis.  Patient's EndoFlip initially demonstrated normal repetitive anterograde contraction pattern and a normal distensibility index of 4-6, esophageal diameter was 13.4-14.4 cm. As we continued the EndoFlip, at volumes of 50ml back down to 40ml, distensibility index decreased to 0.98 and the esophageal diameter was 6.1, consistent with EGD outflow obstruction. We then noted that there was a loss of repetitive anterograde contractions. The second EndoFlip portion is consistent with the patient's abnormal esophageal motility, consistent with likely involving achalasia.\par \par Patient's BRAVO study demonstrated a DeMeester score of 17.5, there was good symptom association probability in regards to cough of greater than 95%, however patient had low symptom index in regards to cough and reflux events. Patient is here in the office today to discuss all testing results, as well as recommendations for how to proceed given her symptoms of severe dysphagia.\par \par (Last visit- Feb 2019)\par 48-year-old female status post tracheobronchoplasty in 2018 for chronic cough, here for followup care. Patient initially saw me one month ago for coughing with the ingestion of solids, abnormal upper GI series with mild transient delay of barium tablet in the lower esophagus. Patient states that prior to her surgery she has never had cervical dysphagia. Patient underwent esophageal motility study which was abnormal, multiple swallows demonstrated ineffective esophageal motility, with some panesophageal pressurization, LES overall did seem to relax.\par \par (last visit- Jan 2019)\par 48-year-old female with history of chronic cough since 2016, at which time she was diagnosed with initially pertussis then meningococcal pneumonia, subsequently with tracheomalacia and status post tracheobronchoplasty and 2018. Subsequent to that surgery, patient's chronic cough had subsided, now she is having coughing with the ingestion of solids. Between November and now, patient had 4 very violent episodes of coughing and choking after the ingestion of solid food. She underwent upper GI series in November 2018, which demonstrated mild transient delay of the pill in the esophagus. There was no gastroesophageal reflux seen. Patient has never had cervical dysphagia prior to her surgery.\par \par On GI review of systems, patient also endorses dysphagia at the level of the mid sternum.  She denies any nausea or vomiting, diarrhea. She has chronic constipation, for which she is taking Linzess 290.  Patient denies any classic heartburn symptoms. Patient states she has never had an upper endoscopy, nor a colonoscopy.\par \par Patient's family history significant for stomach cancer in the maternal grandmother. Paternal grandmother had breast cancer.

## 2019-04-11 ENCOUNTER — APPOINTMENT (OUTPATIENT)
Dept: THORACIC SURGERY | Facility: CLINIC | Age: 49
End: 2019-04-11
Payer: COMMERCIAL

## 2019-04-11 PROCEDURE — 99214 OFFICE O/P EST MOD 30 MIN: CPT

## 2019-04-12 NOTE — REVIEW OF SYSTEMS
[Abdominal Pain] : abdominal pain [Vomiting] : vomiting [Heartburn] : heartburn [Negative] : Respiratory

## 2019-04-12 NOTE — PHYSICAL EXAM
[] : no respiratory distress [Respiration, Rhythm And Depth] : normal respiratory rhythm and effort [Exaggerated Use Of Accessory Muscles For Inspiration] : no accessory muscle use [Heart Rate And Rhythm] : heart rate was normal and rhythm regular [Apical Impulse] : the apical impulse was normal [Heart Sounds] : normal S1 and S2 [Examination Of The Chest] : the chest was normal in appearance [2+] : left 2+ [Abnormal Walk] : normal gait [Oriented To Time, Place, And Person] : oriented to person, place, and time

## 2019-04-15 NOTE — END OF VISIT
[FreeTextEntry3] : All medical record entries made by the Scribe were at my, Dr. Joyce's direction and personally dictated by me on 04/11/2019  I have reviewed the chart and agree that the record accurately reflects my personal performance of the history, physical exam, assessment and plan. I have also personally directed, reviewed, and agreed with the chart.

## 2019-04-15 NOTE — ASSESSMENT
[FreeTextEntry1] : 48-year-old female with history of tracheomalacia status post tracheobronchoplasty on 3/12/2018, now with dysphagia, severe coughing and regurgitation with ingestion of solids, and shortness of breath. \par \par She underwent manometry testing with Dr. Melara who suspects achalasia and recommends a Heller myotomy. I recommend we proceed with awake dynamic bronchoscopy to reassess tracheobronchoplasty and rule out airway etiology, followed by EGD to assess for achalasia. \par \par The patient was advised on the implications of awake bronchoscopy under local anesthetic. The patient was counseled on the risks, benefits and alternatives of proceeding with this procedure. Risk of bleeding, need for transfusion, nerve injury, and need for additional testing, biopsy and/or treatment discussed.\par \par If achalasia is presents I agree with Dr. Melara's recommendation and will likely proceed with Heller myotomy. Of note, patient complains of significant right sided abdominal pain after an episode of coughing, suspect that she strained her rectus muscle. Will obtain imaging from CT abdomen/pelvis ordered by Dr. Jesus. \par \par I have reviewed the patient's medical records and diagnostic images at the time of this office visit and have made the following recommendations\par Plan:\par 1. Bronchoscopy/EGD to reassess airway and esophagus (04/17/19)\par 2. Return after this to discuss results and plan of care \par 3. If airway is normal and achalasia is present will likely proceed with Heller myotomy\par \par

## 2019-04-15 NOTE — HISTORY OF PRESENT ILLNESS
[FreeTextEntry1] : 48 year old female with history of cough, pertussis, presents for a followup visit. She is status post Right VATS, robotic assisted, tracheobronchoplasty with Prolene mesh on 3/12/18.\par \par Postoperatively, patient complained of persistent, dry cough. Guaifenesin -Codeine 100-10mg/5 ml was prescribed. She was started on Elavil and her dose was titrated up to 40 mg daily at bedtime, however she complained of daytime fatigue and sleepiness without any significant relief of her cough. She continued taking guaifenesin -Codeine 100-10mg/5 ml as she reported that was the only medication which relieved her cough. She was ultimately weaned off both the Elavil and cough syrup and started on Gabapentin 100mg 3x/day.\par \par Spirometry completed on 6/18/18 showed FEV1 2.54 = , 91% of predicted value, FVC =2.87 , 81% of predicted value, PEF =5.29 ,79 % of predicted value \par \par Upper GI series esophagram completed on 11/23/18:\par -normal esophagram and unremarkable upper GI series\par \par Manometry testing completed on 03/29/19:\par -DeMeester score of 17.5\par -patient had significant symptoms association probability with cough during the study\par \par PFT done on  showed FEV1 = , % of predicted value, FVC = , % of predicted value, PEF = , % of predicted value and DLCO = , % of predicted value.\par

## 2019-04-16 NOTE — ASU PATIENT PROFILE, ADULT - PSH
H/O arthroscopy of right knee    H/O bilateral breast reduction surgery    H/O tubal ligation  bilateral  History of abdominoplasty  2012, mini  History of bladder surgery  sling  History of carpal tunnel release  left  History of cataract surgery  bilateral  History of lipoma  removed from left shoulder  History of parotidectomy  left  S/P tracheoplasty    Status post arthroscopy of hip  left  Status post right breast lumpectomy  1996

## 2019-04-17 ENCOUNTER — APPOINTMENT (OUTPATIENT)
Dept: THORACIC SURGERY | Facility: HOSPITAL | Age: 49
End: 2019-04-17

## 2019-04-17 ENCOUNTER — OUTPATIENT (OUTPATIENT)
Dept: OUTPATIENT SERVICES | Facility: HOSPITAL | Age: 49
LOS: 1 days | Discharge: HOME | End: 2019-04-17
Payer: COMMERCIAL

## 2019-04-17 ENCOUNTER — RESULT REVIEW (OUTPATIENT)
Age: 49
End: 2019-04-17

## 2019-04-17 VITALS
TEMPERATURE: 98 F | HEART RATE: 68 BPM | DIASTOLIC BLOOD PRESSURE: 67 MMHG | SYSTOLIC BLOOD PRESSURE: 113 MMHG | OXYGEN SATURATION: 99 % | HEIGHT: 64 IN | WEIGHT: 166.01 LBS | RESPIRATION RATE: 16 BRPM

## 2019-04-17 VITALS
HEART RATE: 72 BPM | DIASTOLIC BLOOD PRESSURE: 63 MMHG | OXYGEN SATURATION: 99 % | RESPIRATION RATE: 15 BRPM | SYSTOLIC BLOOD PRESSURE: 115 MMHG

## 2019-04-17 DIAGNOSIS — Z98.890 OTHER SPECIFIED POSTPROCEDURAL STATES: Chronic | ICD-10-CM

## 2019-04-17 DIAGNOSIS — Z98.49 CATARACT EXTRACTION STATUS, UNSPECIFIED EYE: Chronic | ICD-10-CM

## 2019-04-17 DIAGNOSIS — Z98.51 TUBAL LIGATION STATUS: Chronic | ICD-10-CM

## 2019-04-17 DIAGNOSIS — Z86.018 PERSONAL HISTORY OF OTHER BENIGN NEOPLASM: Chronic | ICD-10-CM

## 2019-04-17 PROCEDURE — 31622 DX BRONCHOSCOPE/WASH: CPT

## 2019-04-17 PROCEDURE — 88305 TISSUE EXAM BY PATHOLOGIST: CPT

## 2019-04-17 PROCEDURE — 43200 ESOPHAGOSCOPY FLEXIBLE BRUSH: CPT

## 2019-04-17 PROCEDURE — 43235 EGD DIAGNOSTIC BRUSH WASH: CPT

## 2019-04-19 LAB — SURGICAL PATHOLOGY STUDY: SIGNIFICANT CHANGE UP

## 2019-04-29 ENCOUNTER — CLINICAL ADVICE (OUTPATIENT)
Age: 49
End: 2019-04-29

## 2019-05-01 ENCOUNTER — OUTPATIENT (OUTPATIENT)
Dept: OUTPATIENT SERVICES | Facility: HOSPITAL | Age: 49
LOS: 1 days | Discharge: HOME | End: 2019-05-01
Payer: COMMERCIAL

## 2019-05-01 ENCOUNTER — APPOINTMENT (OUTPATIENT)
Dept: GASTROENTEROLOGY | Facility: HOSPITAL | Age: 49
End: 2019-05-01

## 2019-05-01 DIAGNOSIS — Z98.890 OTHER SPECIFIED POSTPROCEDURAL STATES: Chronic | ICD-10-CM

## 2019-05-01 DIAGNOSIS — Z86.018 PERSONAL HISTORY OF OTHER BENIGN NEOPLASM: Chronic | ICD-10-CM

## 2019-05-01 DIAGNOSIS — K21.9 GASTRO-ESOPHAGEAL REFLUX DISEASE WITHOUT ESOPHAGITIS: ICD-10-CM

## 2019-05-01 DIAGNOSIS — Z98.49 CATARACT EXTRACTION STATUS, UNSPECIFIED EYE: Chronic | ICD-10-CM

## 2019-05-01 DIAGNOSIS — Z98.51 TUBAL LIGATION STATUS: Chronic | ICD-10-CM

## 2019-05-01 PROCEDURE — 91010 ESOPHAGUS MOTILITY STUDY: CPT | Mod: 26,GC

## 2019-05-01 PROCEDURE — 91037 ESOPH IMPED FUNCTION TEST: CPT | Mod: 26,GC

## 2019-05-14 ENCOUNTER — OTHER (OUTPATIENT)
Age: 49
End: 2019-05-14

## 2019-05-17 ENCOUNTER — CLINICAL ADVICE (OUTPATIENT)
Age: 49
End: 2019-05-17

## 2019-05-20 ENCOUNTER — OTHER (OUTPATIENT)
Age: 49
End: 2019-05-20

## 2019-05-21 ENCOUNTER — CLINICAL ADVICE (OUTPATIENT)
Age: 49
End: 2019-05-21

## 2019-05-23 ENCOUNTER — OTHER (OUTPATIENT)
Age: 49
End: 2019-05-23

## 2019-05-30 ENCOUNTER — APPOINTMENT (OUTPATIENT)
Dept: THORACIC SURGERY | Facility: CLINIC | Age: 49
End: 2019-05-30

## 2019-06-06 ENCOUNTER — APPOINTMENT (OUTPATIENT)
Dept: GASTROENTEROLOGY | Facility: CLINIC | Age: 49
End: 2019-06-06
Payer: COMMERCIAL

## 2019-06-06 VITALS
RESPIRATION RATE: 15 BRPM | SYSTOLIC BLOOD PRESSURE: 128 MMHG | BODY MASS INDEX: 25.27 KG/M2 | HEIGHT: 64 IN | HEART RATE: 73 BPM | WEIGHT: 148 LBS | DIASTOLIC BLOOD PRESSURE: 70 MMHG | OXYGEN SATURATION: 98 %

## 2019-06-06 PROCEDURE — 99214 OFFICE O/P EST MOD 30 MIN: CPT

## 2019-06-06 NOTE — ASSESSMENT
[FreeTextEntry1] : 49-year-old female with history of tracheomalacia status post tracheobronchoplasty in 2018, now with oropharyngeal dysphagia, severe coughing with ingestion of solids, no issues with liquids, as well as esophageal dysphagia and chest pressure with solid food, With an abnormal esophageal motility that does not fulfill any singular criteria for esophageal motility abnormalities as per Altha classification V3.0, initially.  But repeat extended esophageal manometry demonstrates ineffective esophageal motility. Her EndoFlip demonstrated loss of repetitive anterograde contractions, suspicious for either involving achalasia or ineffective motility. Patient was unable to tolerate bethanechol secondary to serious side effects such as shortness of breath, extreme dizziness and increased coughing. I explained to the patient and her  that there is no other medications that can help augment the strength of her esophagus, there is only lifestyle and behavioral modification, of which a handout was given to her.  The patient should continue her antacid, as she had a borderline positive bravo.

## 2019-06-06 NOTE — CONSULT LETTER
[Dear  ___] : Dear  [unfilled], [Consult Letter:] : I had the pleasure of evaluating your patient, [unfilled]. [Please see my note below.] : Please see my note below. [Consult Closing:] : Thank you very much for allowing me to participate in the care of this patient.  If you have any questions, please do not hesitate to contact me. [Sincerely,] : Sincerely, [FreeTextEntry2] : Dr. Nathaniel Goodson [FreeTextEntry3] : Dasha BALDERASBenita Melara\par  of Medicine\par Director of Gastrointestinal Motility, [facility] \par Division of Gastroenterology\par [facility]/[address] [place] [address], [address]\[place] [address]\par Tel: [phone]\par Motility Appts-Motility Coordinator: Rina Louis: [phone]\par

## 2019-06-06 NOTE — HISTORY OF PRESENT ILLNESS
[de-identified] : 49-year-old female with chronic cough status post tracheobronchoplasty in 2018, here for followup care. Patient does complain significantly of coughing with the ingestion of liquids. She had an abnormal barium swallow which showed a mild transient delay of the tablet in the lower esophagus. She underwent modified barium swallow with no demonstration of laryngeal penetration or aspiration to different textures. Patient is status post esophageal manometry x2, upper endoscopy with EndoFlip, which demonstrated ineffective esophageal motility. Patient was then started on a trial of bethanechol, but experienced significant shortness of breath, worsening cough, and extreme dizziness. Patient is experiencing extreme frustration, and stating that her symptoms are becoming worse. She is having significant choking with every swallow. Patient feels her shortness of breath and coughing is significantly worsened.  Patient is inquiring as to whether or not her surgery last year has contributed to her symptoms she did not have any experience of dysphagia or choking prior to her surgery.\par \par (last visit- April 2019)\par 48-year-old female with chronic cough status post tracheobronchial plasty and 2018 here for followup care. Patient was initially complaining of coughing with the ingestion of solids, and abnormal upper GI series with mild transient delay of the barium tablet in the lower esophagus. Patient underwent modified barium swallow with different textures, patient demonstrated no signs of laryngeal penetration or aspiration, this was a thin and thick liquids as well as puréed and solids. Despite this, patient is still complaining of severe coughing with and without ingestion of solid food. Patient also complains of severe dysphagia. \par \par Patient's manometry study with borderline demonstrating motility abnormalities not classifiable by Lavinia classification scheme, as 33% of swallows demonstrated pan-esophageal pressurization, other swallows demonstrated ineffective esophageal motility, LES pressures were borderline relaxation was overall pressure of 14.4. Patient then underwent upper endoscopy with EndoFlip with me on March 29, findings were significant by large gastric inlet patch in the upper esophagus, LA grade A esophagitis.  Patient's EndoFlip initially demonstrated normal repetitive anterograde contraction pattern and a normal distensibility index of 4-6, esophageal diameter was 13.4-14.4 cm. As we continued the EndoFlip, at volumes of 50ml back down to 40ml, distensibility index decreased to 0.98 and the esophageal diameter was 6.1, consistent with EGD outflow obstruction. We then noted that there was a loss of repetitive anterograde contractions. The second EndoFlip portion is consistent with the patient's abnormal esophageal motility, consistent with likely involving achalasia.\par \par Patient's BRAVO study demonstrated a DeMeester score of 17.5, there was good symptom association probability in regards to cough of greater than 95%, however patient had low symptom index in regards to cough and reflux events. Patient is here in the office today to discuss all testing results, as well as recommendations for how to proceed given her symptoms of severe dysphagia.\par \par (Last visit- Feb 2019)\par 48-year-old female status post tracheobronchoplasty in 2018 for chronic cough, here for followup care. Patient initially saw me one month ago for coughing with the ingestion of solids, abnormal upper GI series with mild transient delay of barium tablet in the lower esophagus. Patient states that prior to her surgery she has never had cervical dysphagia. Patient underwent esophageal motility study which was abnormal, multiple swallows demonstrated ineffective esophageal motility, with some panesophageal pressurization, LES overall did seem to relax.\par \par (last visit- Jan 2019)\par 48-year-old female with history of chronic cough since 2016, at which time she was diagnosed with initially pertussis then meningococcal pneumonia, subsequently with tracheomalacia and status post tracheobronchoplasty and 2018. Subsequent to that surgery, patient's chronic cough had subsided, now she is having coughing with the ingestion of solids. Between November and now, patient had 4 very violent episodes of coughing and choking after the ingestion of solid food. She underwent upper GI series in November 2018, which demonstrated mild transient delay of the pill in the esophagus. There was no gastroesophageal reflux seen. Patient has never had cervical dysphagia prior to her surgery.\par \par On GI review of systems, patient also endorses dysphagia at the level of the mid sternum.  She denies any nausea or vomiting, diarrhea. She has chronic constipation, for which she is taking Linzess 290.  Patient denies any classic heartburn symptoms. Patient states she has never had an upper endoscopy, nor a colonoscopy.\par \par Patient's family history significant for stomach cancer in the maternal grandmother. Paternal grandmother had breast cancer.

## 2019-06-17 ENCOUNTER — OTHER (OUTPATIENT)
Age: 49
End: 2019-06-17

## 2019-06-17 LAB
CK SERPL-CCNC: 196 U/L
ENA SCL70 IGG SER IA-ACNC: <0.2 AL

## 2019-06-27 ENCOUNTER — FORM ENCOUNTER (OUTPATIENT)
Age: 49
End: 2019-06-27

## 2019-06-27 ENCOUNTER — APPOINTMENT (OUTPATIENT)
Dept: THORACIC SURGERY | Facility: CLINIC | Age: 49
End: 2019-06-27
Payer: COMMERCIAL

## 2019-06-27 VITALS
HEIGHT: 64 IN | HEART RATE: 72 BPM | WEIGHT: 148 LBS | SYSTOLIC BLOOD PRESSURE: 151 MMHG | RESPIRATION RATE: 18 BRPM | BODY MASS INDEX: 25.27 KG/M2 | DIASTOLIC BLOOD PRESSURE: 70 MMHG | TEMPERATURE: 97.9 F

## 2019-06-27 PROCEDURE — 99215 OFFICE O/P EST HI 40 MIN: CPT

## 2019-06-28 ENCOUNTER — APPOINTMENT (OUTPATIENT)
Dept: RADIOLOGY | Facility: HOSPITAL | Age: 49
End: 2019-06-28
Payer: COMMERCIAL

## 2019-06-28 ENCOUNTER — OUTPATIENT (OUTPATIENT)
Dept: OUTPATIENT SERVICES | Facility: HOSPITAL | Age: 49
LOS: 1 days | End: 2019-06-28
Payer: COMMERCIAL

## 2019-06-28 DIAGNOSIS — Z98.890 OTHER SPECIFIED POSTPROCEDURAL STATES: Chronic | ICD-10-CM

## 2019-06-28 DIAGNOSIS — Z98.49 CATARACT EXTRACTION STATUS, UNSPECIFIED EYE: Chronic | ICD-10-CM

## 2019-06-28 DIAGNOSIS — Z86.018 PERSONAL HISTORY OF OTHER BENIGN NEOPLASM: Chronic | ICD-10-CM

## 2019-06-28 DIAGNOSIS — Z98.51 TUBAL LIGATION STATUS: Chronic | ICD-10-CM

## 2019-06-28 PROCEDURE — 74220 X-RAY XM ESOPHAGUS 1CNTRST: CPT

## 2019-06-28 PROCEDURE — 74220 X-RAY XM ESOPHAGUS 1CNTRST: CPT | Mod: 26

## 2019-06-28 NOTE — PHYSICAL EXAM
[Respiration, Rhythm And Depth] : normal respiratory rhythm and effort [Exaggerated Use Of Accessory Muscles For Inspiration] : no accessory muscle use [] : no respiratory distress [Apical Impulse] : the apical impulse was normal [Auscultation Breath Sounds / Voice Sounds] : lungs were clear to auscultation bilaterally [Heart Rate And Rhythm] : heart rate was normal and rhythm regular [Examination Of The Chest] : the chest was normal in appearance [Heart Sounds] : normal S1 and S2 [2+] : left 2+ [Oriented To Time, Place, And Person] : oriented to person, place, and time [Abnormal Walk] : normal gait

## 2019-06-29 ENCOUNTER — LABORATORY RESULT (OUTPATIENT)
Age: 49
End: 2019-06-29

## 2019-06-29 NOTE — HISTORY OF PRESENT ILLNESS
[FreeTextEntry1] : 49 year old female with history of cough, pertussis, presents for a followup visit. She is status post Right VATS, robotic assisted, tracheobronchoplasty with Prolene mesh on 3/12/18. She complains of dysphagia, sob, and dyspnea on exertion. She was evaluated by Dr. Melara and started on bethanechol, but was stopped due to side effects. She presents today to discuss current symptoms. \par \par Postoperatively, patient complained of persistent, dry cough. Guaifenesin -Codeine 100-10mg/5 ml was prescribed. She was started on Elavil and her dose was titrated up to 40 mg daily at bedtime, however she complained of daytime fatigue and sleepiness without any significant relief of her cough. She continued taking guaifenesin -Codeine 100-10mg/5 ml as she reported that was the only medication which relieved her cough. She was ultimately weaned off both the Elavil and cough syrup and started on Gabapentin 100mg 3x/day. \par \par Spirometry completed on 6/18/18 showed FEV1 2.54 = , 91% of predicted value, FVC =2.87 , 81% of predicted value, PEF =5.29 ,79 % of predicted value \par \par Upper GI series esophagram completed on 11/23/18:\par -normal esophagram and unremarkable upper GI series\par \par Manometry testing completed on 03/29/19:\par -DeMeester score of 17.5\par -patient had significant symptoms association probability with cough during the study\par \par Repeat Manometry testing completed on 05/01/19:\par Basal LES: 31.4\par Basal UES: 200.5 (elevated)\par \par Residual LES: 14.4\par Residual UES: 0.9\par \par -Normal basal EGJ pressures with complete deglutitive relaxation \par -65% of swallows demonstrate weak or absent peristalsis, with 15% of swallows demonstrate absence of primary peristalsis\par -15% of swallows demonstrate normal amplitude peristalsis\par -60% of swallows demonstrate complete bolus clearance by impedance analysis \par -manometric evidence of 0.8cm hiatal hernia \par \par \par \par

## 2019-06-29 NOTE — ASSESSMENT
[FreeTextEntry1] : 49 yr old female with a history of a chronic cough and underwent a RIGHT VATS robotic assisted tracheobronchoplasty on 03/12/18. She complains of ongoing dysphagia and a nonproductive cough for the last several months. She explains that food "gets stuck" with both solid and liquid po intake. \par \par She has had repeat manometry testing with Dr. 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 Due to the delay in the relaxation of the cricopharyngeal sphincter I am recommending a barium esophagram to further evaluate the anatomy. I will be present for the barium esophagram and  I will connect with Dr. Melara to obtain her input. \par \par If the timed barium esophagram does demonstrate cricopharyngeal achalasia then reevaluation will take place for potential intervention. I am recommending an evaluation by Dr. Herson Gonzalez for potential Botox injection into the valve. \par \par In order to rule out a pulmonary contribution I am recommending she have a repeat PFT's with Dr. Summers. I am also recommending she be evaluated by Dr. Summers for his input with her symptoms. \par \par The patient explained that she has had previous rheumatologic abnormalities such as an elevated ACE level. I am recommending she be further evaluated by a Rheumatologist for this abnormality as well as repeat the ACE level today. \par \par Plan:\par 1. Timed barium esophagram tomorrow \par 2. PFT's and Dr. Summers evaluation\par 3. Rheumatologist evaluation \par 4. Dr. Herson Gonzalez evaluation \par \par Spoke with Dr. Melara on 6/28.  The manometry does not show evidence of vagal nerve injury.  She also feels that there is not a diagnosis that can yet be assigned to this patient's dysmotility.  She agrees with rheumatology evaluation.\par \par In addition, Dr. Melara also agrees that lifestyle modification should lead to improvement in symptoms.  I had been present for the barium swallow on 6/28 and also discussed with Dr. Melara the results.  There was no evidence of obstruction.  There was no stricture.  The fluid passed easily.  There was evidence of dysmotility.\par \par In regards to the hypertensive UES, Dr. Melara felt that the UES relaxed and that this is not cricopharyngeal dysphagia.  There is no radiographic evidence of Zenkler's.  We did discuss that consideration for UES botox injection may improve symptoms but just as well could exacerbate symptoms, but is not permanent and may be a possibility in the future if lifestyle/dietary modifications do not improve.\par \par The patient and her  are also aware that repeat manometry is probably necessary to assess evolution of dysmotility in order to assess if this develops into achalasia.

## 2019-07-22 ENCOUNTER — OUTPATIENT (OUTPATIENT)
Dept: OUTPATIENT SERVICES | Facility: HOSPITAL | Age: 49
LOS: 1 days | End: 2019-07-22

## 2019-07-22 ENCOUNTER — APPOINTMENT (OUTPATIENT)
Dept: ULTRASOUND IMAGING | Facility: CLINIC | Age: 49
End: 2019-07-22
Payer: COMMERCIAL

## 2019-07-22 ENCOUNTER — APPOINTMENT (OUTPATIENT)
Dept: MRI IMAGING | Facility: CLINIC | Age: 49
End: 2019-07-22
Payer: COMMERCIAL

## 2019-07-22 DIAGNOSIS — Z98.890 OTHER SPECIFIED POSTPROCEDURAL STATES: Chronic | ICD-10-CM

## 2019-07-22 DIAGNOSIS — S73.192D OTHER SPRAIN OF LEFT HIP, SUBSEQUENT ENCOUNTER: ICD-10-CM

## 2019-07-22 DIAGNOSIS — Z98.51 TUBAL LIGATION STATUS: Chronic | ICD-10-CM

## 2019-07-22 DIAGNOSIS — Z86.018 PERSONAL HISTORY OF OTHER BENIGN NEOPLASM: Chronic | ICD-10-CM

## 2019-07-22 DIAGNOSIS — Z98.49 CATARACT EXTRACTION STATUS, UNSPECIFIED EYE: Chronic | ICD-10-CM

## 2019-07-22 PROCEDURE — 20611 DRAIN/INJ JOINT/BURSA W/US: CPT | Mod: LT

## 2019-07-22 PROCEDURE — 73722 MRI JOINT OF LWR EXTR W/DYE: CPT | Mod: 26,LT

## 2019-07-22 PROCEDURE — 76942 ECHO GUIDE FOR BIOPSY: CPT | Mod: 26

## 2019-07-22 PROCEDURE — 27093 INJECTION FOR HIP X-RAY: CPT | Mod: LT

## 2019-07-25 ENCOUNTER — OTHER (OUTPATIENT)
Age: 49
End: 2019-07-25

## 2019-08-07 ENCOUNTER — APPOINTMENT (OUTPATIENT)
Dept: GASTROENTEROLOGY | Facility: CLINIC | Age: 49
End: 2019-08-07
Payer: COMMERCIAL

## 2019-08-07 VITALS
HEIGHT: 64 IN | OXYGEN SATURATION: 97 % | TEMPERATURE: 98.5 F | RESPIRATION RATE: 16 BRPM | BODY MASS INDEX: 25.27 KG/M2 | DIASTOLIC BLOOD PRESSURE: 76 MMHG | WEIGHT: 148 LBS | HEART RATE: 64 BPM | SYSTOLIC BLOOD PRESSURE: 113 MMHG

## 2019-08-07 PROCEDURE — 99214 OFFICE O/P EST MOD 30 MIN: CPT

## 2019-08-07 RX ORDER — BETHANECHOL CHLORIDE 5 MG/1
5 TABLET ORAL DAILY
Qty: 30 | Refills: 0 | Status: DISCONTINUED | COMMUNITY
Start: 2019-05-23 | End: 2019-08-07

## 2019-08-07 NOTE — PHYSICAL EXAM
[General Appearance - Alert] : alert [General Appearance - In No Acute Distress] : in no acute distress [General Appearance - Well Nourished] : well nourished [Sclera] : the sclera and conjunctiva were normal [General Appearance - Well Developed] : well developed [Neck Appearance] : the appearance of the neck was normal [Neck Cervical Mass (___cm)] : no neck mass was observed [Jugular Venous Distention Increased] : there was no jugular-venous distention [Exaggerated Use Of Accessory Muscles For Inspiration] : no accessory muscle use [Heart Sounds] : normal S1 and S2 [Edema] : there was no peripheral edema [Abdomen Soft] : soft [Bowel Sounds] : normal bowel sounds [Abdomen Tenderness] : non-tender [Cervical Lymph Nodes Enlarged Anterior Bilaterally] : anterior cervical [Cervical Lymph Nodes Enlarged Posterior Bilaterally] : posterior cervical [No CVA Tenderness] : no ~M costovertebral angle tenderness [No Spinal Tenderness] : no spinal tenderness [Nail Clubbing] : no clubbing  or cyanosis of the fingernails [] : no rash [No Focal Deficits] : no focal deficits [Oriented To Time, Place, And Person] : oriented to person, place, and time [Affect] : the affect was normal [Impaired Insight] : insight and judgment were intact

## 2019-08-07 NOTE — HISTORY OF PRESENT ILLNESS
[FreeTextEntry1] : 49 with increasing difficulty swallowing. 1 year ago had tracheobronchoplasty for tracheal malacea. She has done well form this perspective but has had increasing trouble with dysphagia and regurgitation of food causing cough. She is able to make a transfer of the food bolus from her mouth to the esophagus. it seems that she begins chocking early on and the symptoms appear to be quite proximal. She has had extensive testing including a barium esophagram, an UGI series, HRM and EGD with Endo Flip. The LES does not appear to have any outflow obstruction. The UES had slightly high resting pressures on HRM. However none of the imaging to date has corroborated any of the manometric findings and contrast seems to flow unimpeded through the esophagus. She johns shave however weak or absent peristalsis and this is also noted on the esophagram.

## 2019-08-07 NOTE — ASSESSMENT
[FreeTextEntry1] : The patient has evidence of ineffective esophageal motility. It is not clear that either the UES or LES is truly obstructive. We can consider Botox of the UES as it will likely be short lived but then this would need a more definitive treatment. patient will get an esophagram with barium tablet and hopefully this will be more revealing of where the symptoms of dysphagia are originating from. \par

## 2019-08-08 ENCOUNTER — OTHER (OUTPATIENT)
Age: 49
End: 2019-08-08

## 2019-08-14 ENCOUNTER — APPOINTMENT (OUTPATIENT)
Dept: PULMONOLOGY | Facility: CLINIC | Age: 49
End: 2019-08-14
Payer: COMMERCIAL

## 2019-08-14 VITALS
BODY MASS INDEX: 25.27 KG/M2 | HEIGHT: 64 IN | OXYGEN SATURATION: 98 % | WEIGHT: 148 LBS | HEART RATE: 65 BPM | DIASTOLIC BLOOD PRESSURE: 76 MMHG | SYSTOLIC BLOOD PRESSURE: 117 MMHG | RESPIRATION RATE: 14 BRPM

## 2019-08-14 PROBLEM — Z87.898 HISTORY OF SHORTNESS OF BREATH: Status: RESOLVED | Noted: 2018-01-11 | Resolved: 2019-08-14

## 2019-08-14 PROCEDURE — 94010 BREATHING CAPACITY TEST: CPT

## 2019-08-14 PROCEDURE — 94618 PULMONARY STRESS TESTING: CPT

## 2019-08-14 PROCEDURE — 94729 DIFFUSING CAPACITY: CPT

## 2019-08-14 PROCEDURE — ZZZZZ: CPT

## 2019-08-14 PROCEDURE — 99214 OFFICE O/P EST MOD 30 MIN: CPT | Mod: 25

## 2019-08-14 NOTE — PHYSICAL EXAM
[General Appearance - Well Developed] : well developed [Normal Appearance] : normal appearance [Well Groomed] : well groomed [General Appearance - Well Nourished] : well nourished [No Deformities] : no deformities [General Appearance - In No Acute Distress] : no acute distress [Normal Conjunctiva] : the conjunctiva exhibited no abnormalities [Normal Oropharynx] : normal oropharynx [Eyelids - No Xanthelasma] : the eyelids demonstrated no xanthelasmas [Neck Appearance] : the appearance of the neck was normal [Jugular Venous Distention Increased] : there was no jugular-venous distention [Neck Cervical Mass (___cm)] : no neck mass was observed [Thyroid Diffuse Enlargement] : the thyroid was not enlarged [Thyroid Nodule] : there were no palpable thyroid nodules [Heart Rate And Rhythm] : heart rate and rhythm were normal [Murmurs] : no murmurs present [Heart Sounds] : normal S1 and S2 [Respiration, Rhythm And Depth] : normal respiratory rhythm and effort [Exaggerated Use Of Accessory Muscles For Inspiration] : no accessory muscle use [Auscultation Breath Sounds / Voice Sounds] : lungs were clear to auscultation bilaterally [Abdomen Soft] : soft [Abdomen Tenderness] : non-tender [Abdomen Mass (___ Cm)] : no abdominal mass palpated [Abnormal Walk] : normal gait [Gait - Sufficient For Exercise Testing] : the gait was sufficient for exercise testing [Nail Clubbing] : no clubbing of the fingernails [Cyanosis, Localized] : no localized cyanosis [Petechial Hemorrhages (___cm)] : no petechial hemorrhages [Skin Color & Pigmentation] : normal skin color and pigmentation [] : no rash [No Venous Stasis] : no venous stasis [Skin Lesions] : no skin lesions [No Skin Ulcers] : no skin ulcer [No Xanthoma] : no  xanthoma was observed [Deep Tendon Reflexes (DTR)] : deep tendon reflexes were 2+ and symmetric [No Focal Deficits] : no focal deficits [Sensation] : the sensory exam was normal to light touch and pinprick [Oriented To Time, Place, And Person] : oriented to person, place, and time [Affect] : the affect was normal [Impaired Insight] : insight and judgment were intact [III] : III [FreeTextEntry1] : I:E ratio 1:3; clear

## 2019-08-14 NOTE — ASSESSMENT
[FreeTextEntry1] : Ms. Lawrence is a 49 y/o female with a history of hypothyroidism, IBS, constipation, pneumococcal PNA, who now comes in for pulmonary re-evaluation s/p tracheoplasty - residual cough / swallowing issues / SOB.\par \par Her SOB is multifactorial:\par -overweight / out of shape\par -poor breathing mechanics\par -asthma\par -?tracheomalacia\par -cardiac disease (pericarditis, PAH)\par -sarcoidosis\par \par problem 1: overweight\par Weight loss, exercise, and diet control were discussed and are highly encouraged. Treatment options were given such as, aqua therapy, and contacting a nutritionist. Recommended to use the elliptical, stationary bike, less use of treadmill. Mindful eating was explained to the patient Obesity is associated with worsening asthma, shortness of breath, and potential for cardiac disease, diabetes, and other underlying medical conditions. \par \par problem 2: poor breathing mechanics\par -Proper breathing techniques were reviewed with an emphasis of exhalation. Patient instructed to breath in for 1 second and out for four seconds. Patient was encouraged to not talk while walking.\par \par problem 3: tracheomalacia- (Test Results Positive)\par -Dynamic chest CT\par -She is s/p tracheoplasty\par Tracheomalacia is usually acquired in adults and common causes include damage by tracheostomy or endotracheal intubation damaging the tracheal cartilage with increase risk with multiple intubations, prolonged intubation, and concurrent high dose steroid therapy; external chest wall trauma and surgery; chronic compression of the trachea by benign etiologies (eg, benign mediastinal goiter) or malignancy; relapsing polychondritis; or recurrent infection. Tracheomalacia can be asymptomatic, however signs or symptoms can develop as the severity of the airway narrowing progresses with major symptoms include dyspnea, cough, and sputum retention. Other symptoms include severe paroxysms of coughing, wheezing or stridor, barking cough and may be exacerbated by forced expiration, cough, and valsalva maneuver. Tracheomalacia is diagnosed by a bronchoscopic visualization of dynamic airway collapse on dynamic chest CT. Therapy is warranted in symptomatic patients with severe tracheomalacia and includes surgical repair as tracheobronchoplasty. The patient was referred to Dr. Nathaniel Goodson or Dr. Mal Joyce, at [facility] for a surgical consult. \par \par Problem 4: Sensory Neuropathic Cough\par - off Neurontin 100 mg Q8H\par -Sensory neuropathic cough is an etiology of cough that is often realized once someone has been ruled out for common disease such as: asthma, COPD, eosinophilic bronchitis, bronchiectasis, post nasal drip, and GERD. It sometimes develops following a URI, herpes zoster outbreak in pharynx or thyroid or cervical spine injury. However, many patients have no identifiable antecedent explanation. \par \par problem 5: chronic cough\par -associated with PND, asthma, GERD, TBM, cardiac disease, sarcoidosis\par -obtain blood work asthma profile, IgE level (Elevated) eosinophil level, vitamin D level, ESR level, ACE level, pertussis titer, chlamydia pneumoniae\par \par Problem 6: Elevated IgE Level\par - She is a candidate for Xolair if asthmatic\par -Xolair is a recombinant DNA- derived humanized IgG1K monoclonal antibody that selectively binds ot human immunoglobulin E (IgE). Xolair is produced by a Chinese hamster ovary cell suspension culture in nutrient medium containing the antibiotic gentamicin. Gentamicin is not detectable in the final product. Xolair is a sterile, white, preservative free, lyophilized powder contained in a single use vial that is reconstituted with sterile water for suspension. Side effects include: wheezing, tightness of the chest, trouble breathing, hives, skin rash, feeling anxious or light-headed, fainting, warmth or tingling under skin, or swelling of face, lips, or tongue \par \par problem 7: GERD - (?achalasia / ?esophageal dysmotility)\par -add Baclofen 5 mg premeal, QHS \par -add Protonix 40 mg QAM, pre-breakfast\par -add Pepcid Complete 40 mg QHS \par -continue to f/u with Dr. Gonzalez / Saritha\par -Rule of 2's- Avoid eating too much, too late, too poorly, too spicy, or two hours before bed \par -Things to avoid including overeating, spicy foods, tight clothing, eating within three hours of bed, this list is not all inclusive. \par -For treatment of reflux, possible options discussed including diet control, H2 blockers, PPIs, as well as coating motility agents discussed as treatment options. Timing of meals and proximity of last meal to sleep were discussed. If symptoms persist, a formal gastrointestinal evaluation is needed.\par \par Problem 8: Allergic Rhinitis\par -continue Clarinex 5 mg QHS\par Environmental measures for allergies were encouraged including mattress and pillow cover, air purifier, and environmental controls.\par \par problem 9: low vitamin D\par -start supplemental Vitamin D\par -Has been associated with asthma exacerbations and increased allergic symptoms. The goal based on recent information is maintaining levels between 50-70 and low normal is 30. Recommended 50,000 units every two weeks to once a month depending on the level.\par \par problem 10: cardiac disease\par -f/u with [facility]\par \par problem 11: r/o asthma\par -order MCT-- To Be Done (re-scribed today)\par - Add Singulair 10 mg QHS\par -HOLD Bevespi 2 puffs BID with spacer\par -HOLD QVar 80mg 2 puffs BID with spacer\par \par \par -Inhaler technique reviewed as well as oral hygiene techniques reviewed with patient. Avoidance of cold air, extremes of temperature, rescue inhaler should be used before exercise. Order of medication reviewed with patient. Recommended use of a cool mist humidifier in the bedroom. \par \par Asthma is believed to be caused by inherited (genetic) and environmental factor, but its exact cause is unknown. Asthma may be triggered by allergens, lung infections, or irritants in the air. Asthma triggers are different for each person.\par \par problem 9: poor sleep\par -set up home sleep study\par \par Good sleep hygiene was encouraged including avoiding watching television an hour before bed, keeping caffeine at a low, avoiding reading, television, or anything, in bed, no drinking any liquids three hours before bedtime, and only getting into bed when tired and ready for sleep. \par \par problem 10: health maintenance\par -recommended a yearly flu shot after October 15\par -recommended strep pneumonia vaccines: Prevnar-13 vaccine, followed by Pneumo vaccine 23 on year following\par -recommended early intervention for URIs\par -recommended osteoporosis evaluations\par -recommended early dermatological evaluations\par -recommended after the age of 50 to the age of 70, colonoscopy every 5 years\par -encouraged early intervention\par \par Follow up in 3 months - SPI / NIOX\par The patient was encouraged to call with any changes, concerns, or questions.

## 2019-08-14 NOTE — ADDENDUM
[FreeTextEntry1] : All medical record entries made by adwoa Garza were at Dr. Jack Summers's, direction and personally dictated by me on 08/14/2019. I have reviewed the chart and agree that the record accurately reflects my personal performance of the history, physical exam, assessment and plan. I have also personally directed, reviewed, and agree with the discharge instructions.

## 2019-08-14 NOTE — PROCEDURE
[FreeTextEntry1] : PFT - spi reveals normal flows; FEV1 is 2.58 which is 99% of predicted, normal flow volume loop. Normal diffusion, DLCO is 21.8 which is 102% of predicted.  \par \par 6 minute walk test reveals a low saturation of 98% with no evidence of dyspnea or fatigue; walked 521.6 meters

## 2019-08-14 NOTE — REASON FOR VISIT
[Follow-Up] : a follow-up visit [FreeTextEntry1] : allergic rhinitis, chronic cough, dysphagia, elevated IgE, GERD, low vitamin D, OW, SOB, and tracheomalacia s/p tracheoplasty

## 2019-08-14 NOTE — HISTORY OF PRESENT ILLNESS
[FreeTextEntry1] : Ms. Lawrence is a 49 year old female with a history of allergic rhinitis, chronic cough, dysphagia, elevated IgE, GERD, low vitamin D, OW, SOB, and tracheomalacia s/p tracheoplasty presenting to the office today for a follow up visit. Her chief complaint is SOB / difficulty swallowing.\par -she states that she has been having significant SOB and difficulty swallowing. she has been having difficulty swallowing since her surgery with Dr. Goodson. she is able to swallow liquids usually, but she feels that she has significant throat congestion. she feels that she has a constant lump in her throat. She was referred to Dr. Melara and Carlos by Dr. Goodson. she was given pantoprazole/Pepcid as her difficulty swallowing was believed to be caused by reflux \par -she reports that she has been having some diarrhea\par -she reports that she coughs while she eats\par -her weight has been stable\par -she has been sleeping decently but needs to use a sleep aid medication. she only sleeps for about 4-5 hours per night\par -she has not been using any inhalers regularly despite getting SOB  during activity still \par -she denies any headaches, nausea, vomiting, fever, chills, sweats, chest pain, chest pressure, constipation, dizziness, leg swelling, leg pain, itchy eyes, itchy ears, or sour taste in the mouth, wheeze.

## 2019-08-28 ENCOUNTER — OUTPATIENT (OUTPATIENT)
Dept: OUTPATIENT SERVICES | Facility: HOSPITAL | Age: 49
LOS: 1 days | End: 2019-08-28

## 2019-08-28 ENCOUNTER — APPOINTMENT (OUTPATIENT)
Dept: RADIOLOGY | Facility: HOSPITAL | Age: 49
End: 2019-08-28
Payer: COMMERCIAL

## 2019-08-28 ENCOUNTER — APPOINTMENT (OUTPATIENT)
Dept: SPEECH THERAPY | Facility: HOSPITAL | Age: 49
End: 2019-08-28
Payer: COMMERCIAL

## 2019-08-28 ENCOUNTER — OUTPATIENT (OUTPATIENT)
Dept: OUTPATIENT SERVICES | Facility: HOSPITAL | Age: 49
LOS: 1 days | Discharge: HOME | End: 2019-08-28

## 2019-08-28 DIAGNOSIS — Z98.51 TUBAL LIGATION STATUS: Chronic | ICD-10-CM

## 2019-08-28 DIAGNOSIS — Z98.890 OTHER SPECIFIED POSTPROCEDURAL STATES: Chronic | ICD-10-CM

## 2019-08-28 DIAGNOSIS — Z98.49 CATARACT EXTRACTION STATUS, UNSPECIFIED EYE: Chronic | ICD-10-CM

## 2019-08-28 DIAGNOSIS — R13.12 DYSPHAGIA, OROPHARYNGEAL PHASE: ICD-10-CM

## 2019-08-28 DIAGNOSIS — Z86.018 PERSONAL HISTORY OF OTHER BENIGN NEOPLASM: Chronic | ICD-10-CM

## 2019-08-28 PROCEDURE — 74230 X-RAY XM SWLNG FUNCJ C+: CPT | Mod: 26

## 2019-09-09 ENCOUNTER — APPOINTMENT (OUTPATIENT)
Dept: RHEUMATOLOGY | Facility: CLINIC | Age: 49
End: 2019-09-09

## 2019-09-13 ENCOUNTER — APPOINTMENT (OUTPATIENT)
Dept: GASTROENTEROLOGY | Facility: HOSPITAL | Age: 49
End: 2019-09-13

## 2019-09-13 ENCOUNTER — OUTPATIENT (OUTPATIENT)
Dept: OUTPATIENT SERVICES | Facility: HOSPITAL | Age: 49
LOS: 1 days | Discharge: HOME | End: 2019-09-13
Payer: COMMERCIAL

## 2019-09-13 DIAGNOSIS — R13.10 DYSPHAGIA, UNSPECIFIED: ICD-10-CM

## 2019-09-13 DIAGNOSIS — Z98.890 OTHER SPECIFIED POSTPROCEDURAL STATES: Chronic | ICD-10-CM

## 2019-09-13 DIAGNOSIS — Z98.51 TUBAL LIGATION STATUS: Chronic | ICD-10-CM

## 2019-09-13 DIAGNOSIS — Z86.018 PERSONAL HISTORY OF OTHER BENIGN NEOPLASM: Chronic | ICD-10-CM

## 2019-09-13 DIAGNOSIS — Z98.49 CATARACT EXTRACTION STATUS, UNSPECIFIED EYE: Chronic | ICD-10-CM

## 2019-09-13 LAB — HCG UR QL: NEGATIVE — SIGNIFICANT CHANGE UP

## 2019-09-13 PROCEDURE — 91040 ESOPH BALLOON DISTENSION TST: CPT | Mod: 26,GC,59

## 2019-09-13 PROCEDURE — 43236 UPPR GI SCOPE W/SUBMUC INJ: CPT | Mod: 59,GC

## 2019-09-13 PROCEDURE — 43239 EGD BIOPSY SINGLE/MULTIPLE: CPT | Mod: GC,59

## 2019-09-13 RX ORDER — SODIUM CHLORIDE 9 MG/ML
500 INJECTION, SOLUTION INTRAVENOUS
Refills: 0 | Status: DISCONTINUED | OUTPATIENT
Start: 2019-09-13 | End: 2019-10-05

## 2019-09-13 RX ADMIN — SODIUM CHLORIDE 30 MILLILITER(S): 9 INJECTION, SOLUTION INTRAVENOUS at 10:30

## 2019-10-03 DIAGNOSIS — R13.10 DYSPHAGIA, UNSPECIFIED: ICD-10-CM

## 2019-10-04 ENCOUNTER — OUTPATIENT (OUTPATIENT)
Dept: OUTPATIENT SERVICES | Facility: HOSPITAL | Age: 49
LOS: 1 days | Discharge: HOME | End: 2019-10-04
Payer: COMMERCIAL

## 2019-10-04 VITALS
DIASTOLIC BLOOD PRESSURE: 73 MMHG | HEART RATE: 65 BPM | SYSTOLIC BLOOD PRESSURE: 124 MMHG | TEMPERATURE: 98 F | WEIGHT: 176.15 LBS | HEIGHT: 64 IN | RESPIRATION RATE: 18 BRPM | OXYGEN SATURATION: 98 %

## 2019-10-04 DIAGNOSIS — Z86.018 PERSONAL HISTORY OF OTHER BENIGN NEOPLASM: Chronic | ICD-10-CM

## 2019-10-04 DIAGNOSIS — M25.559 PAIN IN UNSPECIFIED HIP: ICD-10-CM

## 2019-10-04 DIAGNOSIS — Z98.890 OTHER SPECIFIED POSTPROCEDURAL STATES: Chronic | ICD-10-CM

## 2019-10-04 DIAGNOSIS — S73.192D OTHER SPRAIN OF LEFT HIP, SUBSEQUENT ENCOUNTER: ICD-10-CM

## 2019-10-04 DIAGNOSIS — E03.9 HYPOTHYROIDISM, UNSPECIFIED: ICD-10-CM

## 2019-10-04 DIAGNOSIS — Z98.49 CATARACT EXTRACTION STATUS, UNSPECIFIED EYE: Chronic | ICD-10-CM

## 2019-10-04 DIAGNOSIS — Z01.818 ENCOUNTER FOR OTHER PREPROCEDURAL EXAMINATION: ICD-10-CM

## 2019-10-04 DIAGNOSIS — J39.8 OTHER SPECIFIED DISEASES OF UPPER RESPIRATORY TRACT: ICD-10-CM

## 2019-10-04 DIAGNOSIS — Z90.49 ACQUIRED ABSENCE OF OTHER SPECIFIED PARTS OF DIGESTIVE TRACT: Chronic | ICD-10-CM

## 2019-10-04 DIAGNOSIS — Z98.51 TUBAL LIGATION STATUS: Chronic | ICD-10-CM

## 2019-10-04 LAB
ANION GAP SERPL CALC-SCNC: 7 MMOL/L — SIGNIFICANT CHANGE UP (ref 5–17)
APTT BLD: 28.7 SEC — SIGNIFICANT CHANGE UP (ref 28.5–37)
BASOPHILS # BLD AUTO: 0.06 K/UL — SIGNIFICANT CHANGE UP (ref 0–0.2)
BASOPHILS NFR BLD AUTO: 0.9 % — SIGNIFICANT CHANGE UP (ref 0–2)
BUN SERPL-MCNC: 16 MG/DL — SIGNIFICANT CHANGE UP (ref 7–23)
CALCIUM SERPL-MCNC: 9.2 MG/DL — SIGNIFICANT CHANGE UP (ref 8.5–10.1)
CHLORIDE SERPL-SCNC: 106 MMOL/L — SIGNIFICANT CHANGE UP (ref 96–108)
CO2 SERPL-SCNC: 27 MMOL/L — SIGNIFICANT CHANGE UP (ref 22–31)
CREAT SERPL-MCNC: 0.65 MG/DL — SIGNIFICANT CHANGE UP (ref 0.5–1.3)
EOSINOPHIL # BLD AUTO: 0.08 K/UL — SIGNIFICANT CHANGE UP (ref 0–0.5)
EOSINOPHIL NFR BLD AUTO: 1.2 % — SIGNIFICANT CHANGE UP (ref 0–6)
GLUCOSE SERPL-MCNC: 90 MG/DL — SIGNIFICANT CHANGE UP (ref 70–99)
HCT VFR BLD CALC: 40.8 % — SIGNIFICANT CHANGE UP (ref 34.5–45)
HGB BLD-MCNC: 13.3 G/DL — SIGNIFICANT CHANGE UP (ref 11.5–15.5)
IMM GRANULOCYTES NFR BLD AUTO: 0.5 % — SIGNIFICANT CHANGE UP (ref 0–1.5)
INR BLD: 0.92 RATIO — SIGNIFICANT CHANGE UP (ref 0.88–1.16)
LYMPHOCYTES # BLD AUTO: 1.76 K/UL — SIGNIFICANT CHANGE UP (ref 1–3.3)
LYMPHOCYTES # BLD AUTO: 27.2 % — SIGNIFICANT CHANGE UP (ref 13–44)
MCHC RBC-ENTMCNC: 29.8 PG — SIGNIFICANT CHANGE UP (ref 27–34)
MCHC RBC-ENTMCNC: 32.6 GM/DL — SIGNIFICANT CHANGE UP (ref 32–36)
MCV RBC AUTO: 91.5 FL — SIGNIFICANT CHANGE UP (ref 80–100)
MONOCYTES # BLD AUTO: 0.5 K/UL — SIGNIFICANT CHANGE UP (ref 0–0.9)
MONOCYTES NFR BLD AUTO: 7.7 % — SIGNIFICANT CHANGE UP (ref 2–14)
NEUTROPHILS # BLD AUTO: 4.03 K/UL — SIGNIFICANT CHANGE UP (ref 1.8–7.4)
NEUTROPHILS NFR BLD AUTO: 62.5 % — SIGNIFICANT CHANGE UP (ref 43–77)
NRBC # BLD: 0 /100 WBCS — SIGNIFICANT CHANGE UP (ref 0–0)
PLATELET # BLD AUTO: 295 K/UL — SIGNIFICANT CHANGE UP (ref 150–400)
POTASSIUM SERPL-MCNC: 4.3 MMOL/L — SIGNIFICANT CHANGE UP (ref 3.5–5.3)
POTASSIUM SERPL-SCNC: 4.3 MMOL/L — SIGNIFICANT CHANGE UP (ref 3.5–5.3)
PROTHROM AB SERPL-ACNC: 10.3 SEC — SIGNIFICANT CHANGE UP (ref 10–12.9)
RBC # BLD: 4.46 M/UL — SIGNIFICANT CHANGE UP (ref 3.8–5.2)
RBC # FLD: 13.2 % — SIGNIFICANT CHANGE UP (ref 10.3–14.5)
SODIUM SERPL-SCNC: 140 MMOL/L — SIGNIFICANT CHANGE UP (ref 135–145)
WBC # BLD: 6.46 K/UL — SIGNIFICANT CHANGE UP (ref 3.8–10.5)
WBC # FLD AUTO: 6.46 K/UL — SIGNIFICANT CHANGE UP (ref 3.8–10.5)

## 2019-10-04 PROCEDURE — 93010 ELECTROCARDIOGRAM REPORT: CPT

## 2019-10-04 NOTE — H&P PST ADULT - NSICDXPASTSURGICALHX_GEN_ALL_CORE_FT
PAST SURGICAL HISTORY:  H/O arthroscopy of right knee     H/O bilateral breast reduction surgery     H/O parotidectomy     H/O tubal ligation bilateral    History of abdominoplasty 2012, mini    History of bladder surgery sling    History of carpal tunnel release left    History of cataract surgery bilateral    History of lipoma removed from left shoulder    History of parotidectomy left    S/P tracheoplasty     Status post arthroscopy of hip left    Status post right breast lumpectomy 1996

## 2019-10-04 NOTE — H&P PST ADULT - ASSESSMENT
left hip labral tear  CAPRINI SCORE    AGE RELATED RISK FACTORS                                                       MOBILITY RELATED FACTORS  [x ] Age 41-60 years                                            (1 Point)                  [ ] Bed rest                                                        (1 Point)  [ ] Age: 61-74 years                                           (2 Points)                [ ] Plaster cast                                                   (2 Points)  [ ] Age= 75 years                                              (3 Points)                 [ ] Bed bound for more than 72 hours                   (2 Points)    DISEASE RELATED RISK FACTORS                                               GENDER SPECIFIC FACTORS  [ ] Edema in the lower extremities                       (1 Point)                  [ ] Pregnancy                                                     (1 Point)  [ ] Varicose veins                                               (1 Point)                  [ ] Post-partum < 6 weeks                                   (1 Point)             [ x] BMI > 25 Kg/m2                                            (1 Point)                  [ ] Hormonal therapy  or oral contraception            (1 Point)                 [ ] Sepsis (in the previous month)                        (1 Point)                  [ ] History of pregnancy complications  [ ] Pneumonia or serious lung disease                                               [ ] Unexplained or recurrent                       (1 Point)           (in the previous month)                               (1 Point)  [ ] Abnormal pulmonary function test                     (1 Point)                 SURGERY RELATED RISK FACTORS  [ ] Acute myocardial infarction                              (1 Point)                 [ ]  Section                                            (1 Point)  [ ] Congestive heart failure (in the previous month)  (1 Point)                 [ ] Minor surgery                                                 (1 Point)   [ ] Inflammatory bowel disease                             (1 Point)                 [x ] Arthroscopic surgery                                        (2 Points)  [ ] Central venous access                                    (2 Points)                [ ] General surgery lasting more than 45 minutes   (2 Points)       [ ] Stroke (in the previous month)                          (5 Points)               [ ] Elective arthroplasty                                        (5 Points)                                                                                                                                               HEMATOLOGY RELATED FACTORS                                                 TRAUMA RELATED RISK FACTORS  [ ] Prior episodes of VTE                                     (3 Points)                 [ ] Fracture of the hip, pelvis, or leg                       (5 Points)  [ ] Positive family history for VTE                         (3 Points)                 [ ] Acute spinal cord injury (in the previous month)  (5 Points)  [ ] Prothrombin 89115 A                                      (3 Points)                 [ ] Paralysis  (less than 1 month)                          (5 Points)  [ ] Factor V Leiden                                             (3 Points)                 [ ] Multiple Trauma within 1 month                         (5 Points)  [ ] Lupus anticoagulants                                     (3 Points)                                                           [ ] Anticardiolipin antibodies                                (3 Points)                                                       [ ] High homocysteine in the blood                      (3 Points)                                             [ ] Other congenital or acquired thrombophilia       (3 Points)                                                [ ] Heparin induced thrombocytopenia                  (3 Points)                                          Total Score [   4       ]

## 2019-10-04 NOTE — H&P PST ADULT - NSICDXPROBLEM_GEN_ALL_CORE_FT
PROBLEM DIAGNOSES  Problem: Hip pain  Assessment and Plan: scheduled for left hip arthroscopy    Problem: Tracheal/bronchial disease  Assessment and Plan: seen by Dr. Singletary ( anesthesia) - recommend spinal anesthesia    Problem: Hypothyroid  Assessment and Plan: continue meds

## 2019-10-04 NOTE — H&P PST ADULT - NSICDXFAMILYHX_GEN_ALL_CORE_FT
FAMILY HISTORY:  Father  Still living? No  Family history of diabetes mellitus, Age at diagnosis: Age Unknown  Family history of heart disease, Age at diagnosis: Age Unknown  Family history of hypertension, Age at diagnosis: Age Unknown    Mother  Still living? Yes, Estimated age: 61-70  Family history of diabetes mellitus, Age at diagnosis: Age Unknown

## 2019-10-04 NOTE — H&P PST ADULT - NSICDXPASTMEDICALHX_GEN_ALL_CORE_FT
PAST MEDICAL HISTORY:  Anxiety     Chronic cough     Encounter for cosmetic surgery     Hypothyroidism     IBS (irritable bowel syndrome)     Tracheomalacia

## 2019-10-10 ENCOUNTER — APPOINTMENT (OUTPATIENT)
Dept: GASTROENTEROLOGY | Facility: CLINIC | Age: 49
End: 2019-10-10
Payer: COMMERCIAL

## 2019-10-10 VITALS
DIASTOLIC BLOOD PRESSURE: 78 MMHG | SYSTOLIC BLOOD PRESSURE: 132 MMHG | HEIGHT: 64 IN | HEART RATE: 65 BPM | OXYGEN SATURATION: 98 % | TEMPERATURE: 97.8 F

## 2019-10-10 DIAGNOSIS — Q39.8 OTHER CONGENITAL MALFORMATIONS OF ESOPHAGUS: ICD-10-CM

## 2019-10-10 PROCEDURE — 99214 OFFICE O/P EST MOD 30 MIN: CPT

## 2019-10-15 ENCOUNTER — TRANSCRIPTION ENCOUNTER (OUTPATIENT)
Age: 49
End: 2019-10-15

## 2019-10-16 ENCOUNTER — OUTPATIENT (OUTPATIENT)
Dept: OUTPATIENT SERVICES | Facility: HOSPITAL | Age: 49
LOS: 1 days | Discharge: HOME | End: 2019-10-16

## 2019-10-16 VITALS
TEMPERATURE: 97 F | OXYGEN SATURATION: 99 % | HEIGHT: 64 IN | DIASTOLIC BLOOD PRESSURE: 77 MMHG | WEIGHT: 176.15 LBS | SYSTOLIC BLOOD PRESSURE: 127 MMHG | RESPIRATION RATE: 15 BRPM | HEART RATE: 70 BPM

## 2019-10-16 VITALS
HEART RATE: 69 BPM | DIASTOLIC BLOOD PRESSURE: 67 MMHG | RESPIRATION RATE: 18 BRPM | SYSTOLIC BLOOD PRESSURE: 106 MMHG | OXYGEN SATURATION: 99 % | TEMPERATURE: 97 F

## 2019-10-16 DIAGNOSIS — Z98.890 OTHER SPECIFIED POSTPROCEDURAL STATES: Chronic | ICD-10-CM

## 2019-10-16 DIAGNOSIS — Z98.49 CATARACT EXTRACTION STATUS, UNSPECIFIED EYE: Chronic | ICD-10-CM

## 2019-10-16 DIAGNOSIS — Z86.018 PERSONAL HISTORY OF OTHER BENIGN NEOPLASM: Chronic | ICD-10-CM

## 2019-10-16 DIAGNOSIS — Z90.49 ACQUIRED ABSENCE OF OTHER SPECIFIED PARTS OF DIGESTIVE TRACT: Chronic | ICD-10-CM

## 2019-10-16 DIAGNOSIS — Z98.51 TUBAL LIGATION STATUS: Chronic | ICD-10-CM

## 2019-10-16 RX ORDER — SODIUM CHLORIDE 9 MG/ML
3 INJECTION INTRAMUSCULAR; INTRAVENOUS; SUBCUTANEOUS EVERY 8 HOURS
Refills: 0 | Status: DISCONTINUED | OUTPATIENT
Start: 2019-10-16 | End: 2019-10-16

## 2019-10-16 RX ORDER — SODIUM CHLORIDE 9 MG/ML
1000 INJECTION, SOLUTION INTRAVENOUS
Refills: 0 | Status: DISCONTINUED | OUTPATIENT
Start: 2019-10-16 | End: 2019-10-17

## 2019-10-16 RX ORDER — ONDANSETRON 8 MG/1
4 TABLET, FILM COATED ORAL ONCE
Refills: 0 | Status: DISCONTINUED | OUTPATIENT
Start: 2019-10-16 | End: 2019-10-17

## 2019-10-16 RX ORDER — HYDROMORPHONE HYDROCHLORIDE 2 MG/ML
1 INJECTION INTRAMUSCULAR; INTRAVENOUS; SUBCUTANEOUS
Refills: 0 | Status: DISCONTINUED | OUTPATIENT
Start: 2019-10-16 | End: 2019-10-17

## 2019-10-16 RX ORDER — ERGOCALCIFEROL 1.25 MG/1
1 CAPSULE ORAL
Qty: 0 | Refills: 0 | DISCHARGE

## 2019-10-16 RX ORDER — OMEPRAZOLE 10 MG/1
1 CAPSULE, DELAYED RELEASE ORAL
Qty: 0 | Refills: 0 | DISCHARGE

## 2019-10-16 RX ORDER — OXYCODONE AND ACETAMINOPHEN 5; 325 MG/1; MG/1
1 TABLET ORAL EVERY 4 HOURS
Refills: 0 | Status: DISCONTINUED | OUTPATIENT
Start: 2019-10-16 | End: 2019-10-16

## 2019-10-16 RX ORDER — CELECOXIB 200 MG/1
1 CAPSULE ORAL
Qty: 30 | Refills: 0
Start: 2019-10-16 | End: 2019-11-14

## 2019-10-16 RX ORDER — HYDROMORPHONE HYDROCHLORIDE 2 MG/ML
0.5 INJECTION INTRAMUSCULAR; INTRAVENOUS; SUBCUTANEOUS
Refills: 0 | Status: DISCONTINUED | OUTPATIENT
Start: 2019-10-16 | End: 2019-10-17

## 2019-10-16 RX ORDER — ASPIRIN/CALCIUM CARB/MAGNESIUM 324 MG
1 TABLET ORAL
Qty: 60 | Refills: 0
Start: 2019-10-16 | End: 2019-11-14

## 2019-10-16 RX ADMIN — SODIUM CHLORIDE 75 MILLILITER(S): 9 INJECTION, SOLUTION INTRAVENOUS at 13:14

## 2019-10-16 RX ADMIN — OXYCODONE AND ACETAMINOPHEN 1 TABLET(S): 5; 325 TABLET ORAL at 14:03

## 2019-10-16 RX ADMIN — OXYCODONE AND ACETAMINOPHEN 1 TABLET(S): 5; 325 TABLET ORAL at 13:37

## 2019-10-16 NOTE — ASU DISCHARGE PLAN (ADULT/PEDIATRIC) - CALL YOUR DOCTOR IF YOU HAVE ANY OF THE FOLLOWING:
Excessive diarrhea/Pain not relieved by Medications/Fever greater than (need to indicate Fahrenheit or Celsius)/Numbness, tingling, color or temperature change to extremity/Inability to tolerate liquids or foods/Bleeding that does not stop/Wound/Surgical Site with redness, or foul smelling discharge or pus/Nausea and vomiting that does not stop/Swelling that gets worse/Unable to urinate/Increased irritability or sluggishness

## 2019-10-16 NOTE — ASU PREOP CHECKLIST - SITE MARKED BY SURGEON
If working okay to send in 20 mg (if desires there is a 25 mg tablet she could take for convenience in place of 2 of the 10 mg tablets)   yes

## 2019-10-16 NOTE — ASU PATIENT PROFILE, ADULT - PSH
H/O arthroscopy of right knee    H/O bilateral breast reduction surgery    H/O parotidectomy    H/O tubal ligation  bilateral  History of abdominoplasty  2012, mini  History of bladder surgery  sling  History of carpal tunnel release  left  History of cataract surgery  bilateral  History of lipoma  removed from left shoulder  History of parotidectomy  left  S/P tracheoplasty    Status post arthroscopy of hip  left  Status post right breast lumpectomy  1996

## 2019-10-16 NOTE — CONSULT NOTE ADULT - SUBJECTIVE AND OBJECTIVE BOX
ANDREW AGUILAR is a 49y Female s/p LEFT HIP ARTHROSCOPY WITH LABRAL REPAIR WITH IMAGE    w/ h/o Encounter for cosmetic surgery  Anxiety  IBS (irritable bowel syndrome)  Tracheomalacia  Hypothyroidism  Chronic cough    denies any chest pain shortness of breath palpitation dizziness lightheadedness nausea vomiting fever or chills    H/O parotidectomy  S/P tracheoplasty  Status post right breast lumpectomy  History of bladder surgery  H/O tubal ligation  History of lipoma  H/O bilateral breast reduction surgery  History of cataract surgery  Status post arthroscopy of hip  H/O arthroscopy of right knee  History of carpal tunnel release  History of parotidectomy  History of abdominoplasty    Family history of hypertension (Father)  Family history of diabetes mellitus (Father, Mother)  Family history of heart disease (Father)    SH: doesnot smoke or drink at this time    codeine (Urticaria)  Hycodan (Rash)  niacin (Flushing)  penicillin (Rash)  Vicodin (Pruritus)    HYDROmorphone  Injectable 0.5 milliGRAM(s) IV Push every 10 minutes PRN  HYDROmorphone  Injectable 1 milliGRAM(s) IV Push every 10 minutes PRN  lactated ringers. 1000 milliLiter(s) IV Continuous <Continuous>  ondansetron Injectable 4 milliGRAM(s) IV Push once PRN    T(C): 36.2 (10-16-19 @ 14:33), Max: 36.6 (10-16-19 @ 13:06)  HR: 69 (10-16-19 @ 14:33) (62 - 72)  BP: 106/67 (10-16-19 @ 14:33) (106/67 - 129/63)  RR: 18 (10-16-19 @ 14:33) (14 - 20)  SpO2: 99% (10-16-19 @ 14:33) (97% - 100%)  HEENT unremarkable  neck no JVD or bruit  heart normal S1 S2 RRR no gallops or rubs  chest clear to auscultation  abd sof nontender non distended +bs  ext no calf tenderness    A/P   DVT PX  pain control  bowel regimen   wound care as per ortho  GI PX  antiemetics prn  incentive spirometer

## 2019-10-16 NOTE — ASU DISCHARGE PLAN (ADULT/PEDIATRIC) - ASU DC SPECIAL INSTRUCTIONSFT
flat foot 25% with cruthces x 3 weeks.  Begin PT.  Keep dressing clean and dry, remove tape and gauze in 48 hours, leave skin glue on and shower then pat dry.  No baths.  Follow up with Dr. Bolton in 7-10 days.

## 2019-10-18 DIAGNOSIS — E03.9 HYPOTHYROIDISM, UNSPECIFIED: ICD-10-CM

## 2019-10-18 DIAGNOSIS — F41.9 ANXIETY DISORDER, UNSPECIFIED: ICD-10-CM

## 2019-10-18 DIAGNOSIS — Z88.0 ALLERGY STATUS TO PENICILLIN: ICD-10-CM

## 2019-10-18 DIAGNOSIS — Z88.5 ALLERGY STATUS TO NARCOTIC AGENT: ICD-10-CM

## 2019-10-18 DIAGNOSIS — M24.152 OTHER ARTICULAR CARTILAGE DISORDERS, LEFT HIP: ICD-10-CM

## 2019-10-24 NOTE — ASSESSMENT
[FreeTextEntry1] : Plan for repeat EGD and assessment of inlet patches. Considering response it is reasonable to ablate to completion for both patches. SHe must have some underlying poor motility that is exacerbated by this minor mucosal changes. In addition, the size of them probably resulted in some level of upper esophageal acid and this may have contributed to the sensation of dysphagia.

## 2019-10-24 NOTE — HISTORY OF PRESENT ILLNESS
[FreeTextEntry1] : 49 who has had progressive dysphagia for unclear reasons after a tracheobronchoplasty. She was found to have two inlet patches, one of them being very large. There were no other causes for upper esophageal cervical dysphagia and so after a long discussion the decision was made to ablate the inlet patch. The largest of the two inlet patches was ablated successfully. Initially the patient had pain, but after recovering she states that she is able to eat well. she has mild symptoms and she is here for follow up. She denies neck or throat pain. No weight loss. Appetite is good. Breathing is good.

## 2019-10-24 NOTE — PHYSICAL EXAM
[General Appearance - Alert] : alert [General Appearance - In No Acute Distress] : in no acute distress [General Appearance - Well Nourished] : well nourished [Sclera] : the sclera and conjunctiva were normal [General Appearance - Well Developed] : well developed [Neck Appearance] : the appearance of the neck was normal [Jugular Venous Distention Increased] : there was no jugular-venous distention [Neck Cervical Mass (___cm)] : no neck mass was observed [Exaggerated Use Of Accessory Muscles For Inspiration] : no accessory muscle use [Heart Sounds] : normal S1 and S2 [Edema] : there was no peripheral edema [Abdomen Soft] : soft [Bowel Sounds] : normal bowel sounds [Abdomen Tenderness] : non-tender [Cervical Lymph Nodes Enlarged Posterior Bilaterally] : posterior cervical [Cervical Lymph Nodes Enlarged Anterior Bilaterally] : anterior cervical [No CVA Tenderness] : no ~M costovertebral angle tenderness [No Spinal Tenderness] : no spinal tenderness [] : no rash [Nail Clubbing] : no clubbing  or cyanosis of the fingernails [No Focal Deficits] : no focal deficits [Impaired Insight] : insight and judgment were intact [Oriented To Time, Place, And Person] : oriented to person, place, and time [Affect] : the affect was normal

## 2019-11-05 ENCOUNTER — APPOINTMENT (OUTPATIENT)
Dept: GASTROENTEROLOGY | Facility: HOSPITAL | Age: 49
End: 2019-11-05

## 2019-11-05 ENCOUNTER — OUTPATIENT (OUTPATIENT)
Dept: OUTPATIENT SERVICES | Facility: HOSPITAL | Age: 49
LOS: 1 days | End: 2019-11-05
Payer: COMMERCIAL

## 2019-11-05 DIAGNOSIS — Z98.890 OTHER SPECIFIED POSTPROCEDURAL STATES: Chronic | ICD-10-CM

## 2019-11-05 DIAGNOSIS — Z98.51 TUBAL LIGATION STATUS: Chronic | ICD-10-CM

## 2019-11-05 DIAGNOSIS — R13.10 DYSPHAGIA, UNSPECIFIED: ICD-10-CM

## 2019-11-05 DIAGNOSIS — Q39.8 OTHER CONGENITAL MALFORMATIONS OF ESOPHAGUS: ICD-10-CM

## 2019-11-05 DIAGNOSIS — Z86.018 PERSONAL HISTORY OF OTHER BENIGN NEOPLASM: Chronic | ICD-10-CM

## 2019-11-05 DIAGNOSIS — Z98.49 CATARACT EXTRACTION STATUS, UNSPECIFIED EYE: Chronic | ICD-10-CM

## 2019-11-05 DIAGNOSIS — Z90.49 ACQUIRED ABSENCE OF OTHER SPECIFIED PARTS OF DIGESTIVE TRACT: Chronic | ICD-10-CM

## 2019-11-05 PROCEDURE — 43270 EGD LESION ABLATION: CPT | Mod: 79

## 2019-11-05 PROCEDURE — 43270 EGD LESION ABLATION: CPT | Mod: GC

## 2019-11-06 ENCOUNTER — APPOINTMENT (OUTPATIENT)
Dept: PULMONOLOGY | Facility: CLINIC | Age: 49
End: 2019-11-06

## 2019-11-27 ENCOUNTER — APPOINTMENT (OUTPATIENT)
Dept: PULMONOLOGY | Facility: CLINIC | Age: 49
End: 2019-11-27

## 2020-01-02 ENCOUNTER — APPOINTMENT (OUTPATIENT)
Dept: GASTROENTEROLOGY | Facility: CLINIC | Age: 50
End: 2020-01-02
Payer: COMMERCIAL

## 2020-01-02 VITALS
DIASTOLIC BLOOD PRESSURE: 70 MMHG | HEART RATE: 70 BPM | HEIGHT: 64 IN | WEIGHT: 150 LBS | SYSTOLIC BLOOD PRESSURE: 116 MMHG | OXYGEN SATURATION: 99 % | RESPIRATION RATE: 16 BRPM | BODY MASS INDEX: 25.61 KG/M2

## 2020-01-02 PROCEDURE — 99214 OFFICE O/P EST MOD 30 MIN: CPT

## 2020-01-03 NOTE — ASSESSMENT
[FreeTextEntry1] : Schedule colonoscopy for what seems to have been mild diverticulitis\par She had an elevated WBC and left sided pain and fullness which responded to Abx, but CT was overall negative although this may be accounted for by the Abx\par Otherwise her eating has greatly improved and there is no role for further ablation or even surveillance of these inlet patches since they are overall benign.

## 2020-01-03 NOTE — HISTORY OF PRESENT ILLNESS
[FreeTextEntry1] : 49 with tracheobronchoplasty s/p repair with worsening dysphagia in the setting of ineffective esophageal motility. Only finding was two very large inlet patches. After first round of ablation patient had an excellent response. There was residual inlet patch and this was further ablated. She is now able to eat everything. She still has a minor cough which may or may not be attributable. She denies any food impaction. No dysphagia. No chest pain with eating. No spasm. No back pain. No choking. Denies upper abdominal pain, dyspepsia, bloating or tenesmus. No change in bowel habits. \par \par Recently had an episode of fever, left lower quadrant fullness and an elevated WBC in [facility] office. She responded to cipro. She had a scan which didn’t reveal anything but the episode quickly resolved. There was no blood in stool. Fever and WBC resolved after 2 days. No she feels well. She has never had a colonoscopy.

## 2020-02-02 NOTE — PHYSICAL EXAM
[General Appearance - Alert] : alert [General Appearance - In No Acute Distress] : in no acute distress [General Appearance - Well Developed] : well developed [General Appearance - Well Nourished] : well nourished [Sclera] : the sclera and conjunctiva were normal [Neck Cervical Mass (___cm)] : no neck mass was observed [Neck Appearance] : the appearance of the neck was normal [Jugular Venous Distention Increased] : there was no jugular-venous distention [Exaggerated Use Of Accessory Muscles For Inspiration] : no accessory muscle use [Heart Sounds] : normal S1 and S2 [Bowel Sounds] : normal bowel sounds [Edema] : there was no peripheral edema [Abdomen Tenderness] : non-tender [Abdomen Soft] : soft [Cervical Lymph Nodes Enlarged Posterior Bilaterally] : posterior cervical [Cervical Lymph Nodes Enlarged Anterior Bilaterally] : anterior cervical [No CVA Tenderness] : no ~M costovertebral angle tenderness [No Spinal Tenderness] : no spinal tenderness [Nail Clubbing] : no clubbing  or cyanosis of the fingernails [] : no rash [No Focal Deficits] : no focal deficits [Affect] : the affect was normal [Oriented To Time, Place, And Person] : oriented to person, place, and time [Impaired Insight] : insight and judgment were intact negative - no cough

## 2020-03-27 NOTE — ASU PREOP CHECKLIST - STERILIZATION AFFIRMATION
This writer left a second voicemail regarding upcoming appointment with Vashti Molina.  Informed mom that appointment will be canceled and reschedule at a later date.    n/a

## 2020-05-04 ENCOUNTER — TRANSCRIPTION ENCOUNTER (OUTPATIENT)
Age: 50
End: 2020-05-04

## 2020-05-20 ENCOUNTER — APPOINTMENT (OUTPATIENT)
Dept: GASTROENTEROLOGY | Facility: CLINIC | Age: 50
End: 2020-05-20
Payer: COMMERCIAL

## 2020-05-20 DIAGNOSIS — K58.9 IRRITABLE BOWEL SYNDROME W/OUT DIARRHEA: ICD-10-CM

## 2020-05-20 DIAGNOSIS — K57.32 DIVERTICULITIS OF LARGE INTESTINE W/OUT PERFORATION OR ABSCESS W/OUT BLEEDING: ICD-10-CM

## 2020-05-20 PROCEDURE — 99213 OFFICE O/P EST LOW 20 MIN: CPT

## 2020-05-28 PROBLEM — K57.32 DIVERTICULITIS OF COLON: Status: ACTIVE | Noted: 2020-01-02

## 2020-05-28 PROBLEM — K58.9 IRRITABLE BOWEL SYNDROME, UNSPECIFIED TYPE: Status: ACTIVE | Noted: 2018-01-11

## 2020-05-28 NOTE — HISTORY OF PRESENT ILLNESS
[FreeTextEntry1] : 50 with history of dysphagia and some poor esophageal motility which has improved after treatment of a very large inlet patch of the proximal esophagus. \par She also alteration in BM which is concerning her. She needs a colonoscopy which she has put off and now wants to do. No blood in stool. No pain with defecation. No steatorrhea. No melena. No weight loss. Appetite is good.

## 2020-06-03 ENCOUNTER — RX RENEWAL (OUTPATIENT)
Age: 50
End: 2020-06-03

## 2020-06-08 ENCOUNTER — OUTPATIENT (OUTPATIENT)
Dept: OUTPATIENT SERVICES | Facility: HOSPITAL | Age: 50
LOS: 1 days | End: 2020-06-08
Payer: COMMERCIAL

## 2020-06-08 VITALS
RESPIRATION RATE: 14 BRPM | SYSTOLIC BLOOD PRESSURE: 132 MMHG | TEMPERATURE: 98 F | OXYGEN SATURATION: 97 % | DIASTOLIC BLOOD PRESSURE: 79 MMHG | WEIGHT: 160.06 LBS | HEIGHT: 64 IN | HEART RATE: 73 BPM

## 2020-06-08 DIAGNOSIS — R10.9 UNSPECIFIED ABDOMINAL PAIN: ICD-10-CM

## 2020-06-08 DIAGNOSIS — Z98.890 OTHER SPECIFIED POSTPROCEDURAL STATES: Chronic | ICD-10-CM

## 2020-06-08 DIAGNOSIS — Z98.49 CATARACT EXTRACTION STATUS, UNSPECIFIED EYE: Chronic | ICD-10-CM

## 2020-06-08 DIAGNOSIS — Z90.49 ACQUIRED ABSENCE OF OTHER SPECIFIED PARTS OF DIGESTIVE TRACT: Chronic | ICD-10-CM

## 2020-06-08 DIAGNOSIS — Z86.018 PERSONAL HISTORY OF OTHER BENIGN NEOPLASM: Chronic | ICD-10-CM

## 2020-06-08 DIAGNOSIS — Z98.51 TUBAL LIGATION STATUS: Chronic | ICD-10-CM

## 2020-06-08 DIAGNOSIS — Z01.818 ENCOUNTER FOR OTHER PREPROCEDURAL EXAMINATION: ICD-10-CM

## 2020-06-08 PROCEDURE — G0463: CPT

## 2020-06-08 RX ORDER — IBUPROFEN 200 MG
1 TABLET ORAL
Qty: 0 | Refills: 0 | DISCHARGE

## 2020-06-08 NOTE — H&P PST ADULT - NSICDXPASTSURGICALHX_GEN_ALL_CORE_FT
PAST SURGICAL HISTORY:  H/O arthroscopy of right knee     H/O bilateral breast reduction surgery     H/O parotidectomy     H/O tubal ligation bilateral    History of abdominoplasty 2012, mini    History of bladder surgery sling    History of carpal tunnel release left    History of cataract surgery bilateral    History of lipoma removed from left shoulder    History of parotidectomy left    S/P tracheoplasty 2018 can use size 7 ET tubeello    Status post arthroscopy of hip left    Status post right breast lumpectomy 1996 PAST SURGICAL HISTORY:  H/O arthroscopy of right knee     H/O bilateral breast reduction surgery     H/O parotidectomy     H/O tubal ligation bilateral    History of abdominoplasty 2012, mini    History of bladder surgery sling    History of carpal tunnel release left    History of cataract surgery bilateral    History of lipoma removed from left shoulder    History of parotidectomy left    S/P tracheoplasty 2018 can use size 7 ET tube "I have been told I am totally fine now"    Status post arthroscopy of hip left    Status post right breast lumpectomy 1996

## 2020-06-08 NOTE — H&P PST ADULT - HISTORY OF PRESENT ILLNESS
She has been having pain for over 1 year. She is using Advil which does not help. She is using an abdoiminal binder. This 49 yo  is scheduled Repair of abdominal wall defect  with Dr Maharaj  on 6/25/2020 . She has had intermittent abdominal pain (RUQ) which is not relieved with Advil. She is wearing and abdominal binder for support which helps. Pt reports "being told that I woke up during my last procedure and I bucked and maybe I did damage during that"  She denies any N/V, changes in bowels.   Due to COVID-19 protocol the history is being done via telephone interview and physical exam will be done on admit. This 51 yo  is scheduled Repair of abdominal wall defect  with Dr Randolph  on 6/25/2020 . She has had intermittent abdominal pain (RUQ) which is not relieved with Advil. She is wearing and abdominal binder for support which helps. Pt reports "being told that I woke up during my last procedure and I bucked and maybe I did damage during that"  She denies any N/V, changes in bowels.   Due to COVID-19 protocol the history is being done via telephone interview and physical exam will be done on admit.

## 2020-06-08 NOTE — H&P PST ADULT - ASSESSMENT
This 51 yo  is scheduled Repair of abdominal wall defect  with Dr Maharaj  on 6/25/2020 . This 49 yo  is scheduled Repair of abdominal wall defect  with Dr Randolph  on 6/25/2020 .

## 2020-06-08 NOTE — H&P PST ADULT - ATTENDING COMMENTS
ADDENDUM 6/25/2020: As per current COVID-19 protocol pt seen in holding room prior to procedure for physical exam/update H&P.    Pt awake, alert and oriented X 3. Vs as charted; afebrile. Pt denies any changes in her health since PST telephone interview. Medical clearance on chart. COVID-19 swab = non detected. Physical exam as charted. Pt optimized to proceed for scheduled procedure; Repair Abdominal wall  defect with Dr Yousif Randolph.    Kellen Gonzalez ANP-C

## 2020-06-08 NOTE — H&P PST ADULT - NSICDXPASTMEDICALHX_GEN_ALL_CORE_FT
PAST MEDICAL HISTORY:  Anxiety     Chronic cough     Encounter for cosmetic surgery Breast Reduction/Abdominoplasty    Hypothyroidism     IBS (irritable bowel syndrome)     Tracheomalacia corrected in 2018 PAST MEDICAL HISTORY:  Allergic rhinitis, seasonal     Anxiety     Chronic cough     Encounter for cosmetic surgery Breast Reduction/Abdominoplasty    Esophageal dysmotility     GERD (gastroesophageal reflux disease)     Hypothyroidism     IBS (irritable bowel syndrome)     Low serum vitamin D     Tracheomalacia corrected in 2018

## 2020-06-08 NOTE — H&P PST ADULT - NSICDXPROBLEM_GEN_ALL_CORE_FT
PROBLEM DIAGNOSES  Problem: Unspecified abdominal pain  Assessment and Plan: Medical evaluation with Dr. Rashid. Pt is aware that she will need to have PST's done with primary. Recent labs form Endocrinology 5/20/2020 are in her chart.   All preoperative instructions including CHD cleanse reviewed with patient who verbalized understanding.   Covid swab at [place] [facility]  Avoid all NSAID/ASA containing products as well as all herbal/vitamin supplements. Tylenol only for pain/headache.

## 2020-06-22 PROBLEM — K21.9 GASTRO-ESOPHAGEAL REFLUX DISEASE WITHOUT ESOPHAGITIS: Chronic | Status: ACTIVE | Noted: 2020-06-08

## 2020-06-22 PROBLEM — J30.2 OTHER SEASONAL ALLERGIC RHINITIS: Chronic | Status: ACTIVE | Noted: 2020-06-08

## 2020-06-22 PROBLEM — R79.89 OTHER SPECIFIED ABNORMAL FINDINGS OF BLOOD CHEMISTRY: Chronic | Status: ACTIVE | Noted: 2020-06-08

## 2020-06-22 PROBLEM — K22.4 DYSKINESIA OF ESOPHAGUS: Chronic | Status: ACTIVE | Noted: 2020-06-08

## 2020-06-23 ENCOUNTER — OUTPATIENT (OUTPATIENT)
Dept: OUTPATIENT SERVICES | Facility: HOSPITAL | Age: 50
LOS: 1 days | End: 2020-06-23
Payer: COMMERCIAL

## 2020-06-23 DIAGNOSIS — Z98.890 OTHER SPECIFIED POSTPROCEDURAL STATES: Chronic | ICD-10-CM

## 2020-06-23 DIAGNOSIS — Z90.49 ACQUIRED ABSENCE OF OTHER SPECIFIED PARTS OF DIGESTIVE TRACT: Chronic | ICD-10-CM

## 2020-06-23 DIAGNOSIS — Z98.51 TUBAL LIGATION STATUS: Chronic | ICD-10-CM

## 2020-06-23 DIAGNOSIS — Z11.59 ENCOUNTER FOR SCREENING FOR OTHER VIRAL DISEASES: ICD-10-CM

## 2020-06-23 DIAGNOSIS — Z86.018 PERSONAL HISTORY OF OTHER BENIGN NEOPLASM: Chronic | ICD-10-CM

## 2020-06-23 DIAGNOSIS — Z98.49 CATARACT EXTRACTION STATUS, UNSPECIFIED EYE: Chronic | ICD-10-CM

## 2020-06-23 PROCEDURE — U0003: CPT

## 2020-06-24 ENCOUNTER — TRANSCRIPTION ENCOUNTER (OUTPATIENT)
Age: 50
End: 2020-06-24

## 2020-06-24 LAB — SARS-COV-2 RNA SPEC QL NAA+PROBE: SIGNIFICANT CHANGE UP

## 2020-06-24 NOTE — ASU PATIENT PROFILE, ADULT - PSH
H/O arthroscopy of right knee    H/O bilateral breast reduction surgery    H/O parotidectomy    H/O tubal ligation  bilateral  History of abdominoplasty  2012, mini  History of bladder surgery  sling  History of carpal tunnel release  left  History of cataract surgery  bilateral  History of lipoma  removed from left shoulder  History of parotidectomy  left  S/P tracheoplasty  2018 can use size 7 ET tube "I have been told I am totally fine now"  Status post arthroscopy of hip  left  Status post right breast lumpectomy  1996

## 2020-06-24 NOTE — ASU PATIENT PROFILE, ADULT - PMH
Allergic rhinitis, seasonal    Anxiety    Chronic cough    Encounter for cosmetic surgery  Breast Reduction/Abdominoplasty  Esophageal dysmotility    GERD (gastroesophageal reflux disease)    Hypothyroidism    IBS (irritable bowel syndrome)    Low serum vitamin D    Tracheomalacia  corrected in 2018

## 2020-06-25 ENCOUNTER — OUTPATIENT (OUTPATIENT)
Dept: OUTPATIENT SERVICES | Facility: HOSPITAL | Age: 50
LOS: 1 days | End: 2020-06-25
Payer: COMMERCIAL

## 2020-06-25 VITALS
RESPIRATION RATE: 18 BRPM | DIASTOLIC BLOOD PRESSURE: 80 MMHG | OXYGEN SATURATION: 97 % | HEART RATE: 81 BPM | SYSTOLIC BLOOD PRESSURE: 131 MMHG

## 2020-06-25 VITALS
HEIGHT: 64 IN | HEART RATE: 70 BPM | WEIGHT: 160.06 LBS | RESPIRATION RATE: 14 BRPM | TEMPERATURE: 98 F | DIASTOLIC BLOOD PRESSURE: 78 MMHG | OXYGEN SATURATION: 97 % | SYSTOLIC BLOOD PRESSURE: 132 MMHG

## 2020-06-25 DIAGNOSIS — Z98.890 OTHER SPECIFIED POSTPROCEDURAL STATES: Chronic | ICD-10-CM

## 2020-06-25 DIAGNOSIS — Z98.51 TUBAL LIGATION STATUS: Chronic | ICD-10-CM

## 2020-06-25 DIAGNOSIS — Z90.49 ACQUIRED ABSENCE OF OTHER SPECIFIED PARTS OF DIGESTIVE TRACT: Chronic | ICD-10-CM

## 2020-06-25 DIAGNOSIS — R10.9 UNSPECIFIED ABDOMINAL PAIN: ICD-10-CM

## 2020-06-25 DIAGNOSIS — Z98.49 CATARACT EXTRACTION STATUS, UNSPECIFIED EYE: Chronic | ICD-10-CM

## 2020-06-25 DIAGNOSIS — Z86.018 PERSONAL HISTORY OF OTHER BENIGN NEOPLASM: Chronic | ICD-10-CM

## 2020-06-25 LAB
HCG UR QL: NEGATIVE — SIGNIFICANT CHANGE UP
HCT VFR BLD CALC: 35.7 % — SIGNIFICANT CHANGE UP (ref 34.5–45)
HGB BLD-MCNC: 12.1 G/DL — SIGNIFICANT CHANGE UP (ref 11.5–15.5)
MCHC RBC-ENTMCNC: 30.1 PG — SIGNIFICANT CHANGE UP (ref 27–34)
MCHC RBC-ENTMCNC: 33.9 GM/DL — SIGNIFICANT CHANGE UP (ref 32–36)
MCV RBC AUTO: 88.8 FL — SIGNIFICANT CHANGE UP (ref 80–100)
NRBC # BLD: 0 /100 WBCS — SIGNIFICANT CHANGE UP (ref 0–0)
PLATELET # BLD AUTO: 256 K/UL — SIGNIFICANT CHANGE UP (ref 150–400)
RBC # BLD: 4.02 M/UL — SIGNIFICANT CHANGE UP (ref 3.8–5.2)
RBC # FLD: 12.5 % — SIGNIFICANT CHANGE UP (ref 10.3–14.5)
WBC # BLD: 8.15 K/UL — SIGNIFICANT CHANGE UP (ref 3.8–10.5)
WBC # FLD AUTO: 8.15 K/UL — SIGNIFICANT CHANGE UP (ref 3.8–10.5)

## 2020-06-25 PROCEDURE — 86900 BLOOD TYPING SEROLOGIC ABO: CPT

## 2020-06-25 PROCEDURE — 86850 RBC ANTIBODY SCREEN: CPT

## 2020-06-25 PROCEDURE — 17999 UNLISTD PX SKN MUC MEMB SUBQ: CPT

## 2020-06-25 PROCEDURE — 81025 URINE PREGNANCY TEST: CPT

## 2020-06-25 PROCEDURE — 85027 COMPLETE CBC AUTOMATED: CPT

## 2020-06-25 PROCEDURE — 86901 BLOOD TYPING SEROLOGIC RH(D): CPT

## 2020-06-25 RX ORDER — SODIUM CHLORIDE 9 MG/ML
1000 INJECTION, SOLUTION INTRAVENOUS
Refills: 0 | Status: DISCONTINUED | OUTPATIENT
Start: 2020-06-25 | End: 2020-06-25

## 2020-06-25 RX ORDER — ONDANSETRON 8 MG/1
4 TABLET, FILM COATED ORAL ONCE
Refills: 0 | Status: DISCONTINUED | OUTPATIENT
Start: 2020-06-25 | End: 2020-06-25

## 2020-06-25 RX ORDER — OXYCODONE HYDROCHLORIDE 5 MG/1
5 TABLET ORAL ONCE
Refills: 0 | Status: DISCONTINUED | OUTPATIENT
Start: 2020-06-25 | End: 2020-06-25

## 2020-06-25 RX ORDER — BUPIVACAINE 13.3 MG/ML
20 INJECTION, SUSPENSION, LIPOSOMAL INFILTRATION ONCE
Refills: 0 | Status: DISCONTINUED | OUTPATIENT
Start: 2020-06-25 | End: 2020-07-10

## 2020-06-25 RX ADMIN — SODIUM CHLORIDE 75 MILLILITER(S): 9 INJECTION, SOLUTION INTRAVENOUS at 11:50

## 2020-06-25 NOTE — BRIEF OPERATIVE NOTE - NSICDXBRIEFPROCEDURE_GEN_ALL_CORE_FT
PROCEDURES:  Exploration of abdominal wound 25-Jun-2020 11:26:34 repair of abdominal wall defect Mariia Suresh

## 2020-06-25 NOTE — ASU DISCHARGE PLAN (ADULT/PEDIATRIC) - CARE PROVIDER_API CALL
Yousif Randolph  PLASTIC SURGERY  [address] [place] [address]  Phone: [phone]  Fax: [phone]  Follow Up Time:

## 2020-06-25 NOTE — ASU DISCHARGE PLAN (ADULT/PEDIATRIC) - CALL YOUR DOCTOR IF YOU HAVE ANY OF THE FOLLOWING:
Swelling that gets worse/Bleeding that does not stop/Pain not relieved by Medications/Fever greater than (need to indicate Fahrenheit or Celsius)

## 2020-06-25 NOTE — ASU DISCHARGE PLAN (ADULT/PEDIATRIC) - ASU DC SPECIAL INSTRUCTIONSFT
-Keep dressings and abdominal binder in place  -You may begin showering as usual tomorrow, pat dry, no tub baths, no swimming pools, no hot tubs  -Apply water proof ice packs, 20 mins on, 20 mins off to help decrease pain and swelling  -Empty and record BECKI drain output twice a day and bring measurements to office  -Pt scheduled to be seen in the office TOMORROW 6/26/20 by Dr. Randolph

## 2020-07-28 DIAGNOSIS — Z01.818 ENCOUNTER FOR OTHER PREPROCEDURAL EXAMINATION: ICD-10-CM

## 2020-07-31 ENCOUNTER — APPOINTMENT (OUTPATIENT)
Dept: DISASTER EMERGENCY | Facility: CLINIC | Age: 50
End: 2020-07-31

## 2020-07-31 RX ORDER — SODIUM CHLORIDE 9 MG/ML
1000 INJECTION, SOLUTION INTRAVENOUS
Refills: 0 | Status: DISCONTINUED | OUTPATIENT
Start: 2020-08-03 | End: 2020-08-18

## 2020-08-01 LAB — SARS-COV-2 N GENE NPH QL NAA+PROBE: NOT DETECTED

## 2020-08-03 ENCOUNTER — RESULT REVIEW (OUTPATIENT)
Age: 50
End: 2020-08-03

## 2020-08-03 ENCOUNTER — APPOINTMENT (OUTPATIENT)
Dept: GASTROENTEROLOGY | Facility: HOSPITAL | Age: 50
End: 2020-08-03

## 2020-08-03 ENCOUNTER — OUTPATIENT (OUTPATIENT)
Dept: OUTPATIENT SERVICES | Facility: HOSPITAL | Age: 50
LOS: 1 days | Discharge: HOME | End: 2020-08-03
Payer: COMMERCIAL

## 2020-08-03 VITALS
SYSTOLIC BLOOD PRESSURE: 124 MMHG | OXYGEN SATURATION: 100 % | DIASTOLIC BLOOD PRESSURE: 72 MMHG | RESPIRATION RATE: 17 BRPM | HEART RATE: 62 BPM

## 2020-08-03 VITALS
SYSTOLIC BLOOD PRESSURE: 131 MMHG | HEIGHT: 64 IN | OXYGEN SATURATION: 98 % | WEIGHT: 164.91 LBS | HEART RATE: 64 BPM | DIASTOLIC BLOOD PRESSURE: 94 MMHG | RESPIRATION RATE: 14 BRPM | TEMPERATURE: 98 F

## 2020-08-03 DIAGNOSIS — Z98.890 OTHER SPECIFIED POSTPROCEDURAL STATES: Chronic | ICD-10-CM

## 2020-08-03 DIAGNOSIS — Z98.49 CATARACT EXTRACTION STATUS, UNSPECIFIED EYE: Chronic | ICD-10-CM

## 2020-08-03 DIAGNOSIS — Z98.51 TUBAL LIGATION STATUS: Chronic | ICD-10-CM

## 2020-08-03 DIAGNOSIS — K57.32 DIVERTICULITIS OF LARGE INTESTINE WITHOUT PERFORATION OR ABSCESS WITHOUT BLEEDING: ICD-10-CM

## 2020-08-03 DIAGNOSIS — Z86.018 PERSONAL HISTORY OF OTHER BENIGN NEOPLASM: Chronic | ICD-10-CM

## 2020-08-03 DIAGNOSIS — Z90.49 ACQUIRED ABSENCE OF OTHER SPECIFIED PARTS OF DIGESTIVE TRACT: Chronic | ICD-10-CM

## 2020-08-03 PROCEDURE — 88305 TISSUE EXAM BY PATHOLOGIST: CPT | Mod: 26

## 2020-08-03 PROCEDURE — 45385 COLONOSCOPY W/LESION REMOVAL: CPT

## 2020-08-03 RX ADMIN — SODIUM CHLORIDE 30 MILLILITER(S): 9 INJECTION, SOLUTION INTRAVENOUS at 07:49

## 2020-08-13 ENCOUNTER — RX RENEWAL (OUTPATIENT)
Age: 50
End: 2020-08-13

## 2020-09-03 ENCOUNTER — APPOINTMENT (OUTPATIENT)
Dept: CARDIOLOGY | Facility: CLINIC | Age: 50
End: 2020-09-03
Payer: COMMERCIAL

## 2020-09-03 ENCOUNTER — NON-APPOINTMENT (OUTPATIENT)
Age: 50
End: 2020-09-03

## 2020-09-03 VITALS
BODY MASS INDEX: 25.61 KG/M2 | SYSTOLIC BLOOD PRESSURE: 130 MMHG | DIASTOLIC BLOOD PRESSURE: 84 MMHG | HEIGHT: 64 IN | HEART RATE: 96 BPM | WEIGHT: 150 LBS | OXYGEN SATURATION: 98 %

## 2020-09-03 PROCEDURE — 99204 OFFICE O/P NEW MOD 45 MIN: CPT

## 2020-09-03 PROCEDURE — 93000 ELECTROCARDIOGRAM COMPLETE: CPT

## 2020-09-03 RX ORDER — SODIUM SULFATE, POTASSIUM SULFATE, MAGNESIUM SULFATE 17.5; 3.13; 1.6 G/ML; G/ML; G/ML
17.5-3.13-1.6 SOLUTION, CONCENTRATE ORAL
Qty: 1 | Refills: 0 | Status: DISCONTINUED | COMMUNITY
Start: 2020-03-16 | End: 2020-09-03

## 2020-09-03 RX ORDER — BACLOFEN 10 MG/1
10 TABLET ORAL 3 TIMES DAILY
Qty: 90 | Refills: 5 | Status: DISCONTINUED | COMMUNITY
Start: 2019-08-14 | End: 2020-09-03

## 2020-09-03 RX ORDER — SUCRALFATE 1 G/10ML
1 SUSPENSION ORAL 3 TIMES DAILY
Qty: 2 | Refills: 5 | Status: DISCONTINUED | COMMUNITY
Start: 2019-09-18 | End: 2020-09-03

## 2020-09-03 RX ORDER — LEVOTHYROXINE SODIUM 0.03 MG/1
25 TABLET ORAL
Refills: 0 | Status: ACTIVE | COMMUNITY
Start: 2017-10-13

## 2020-09-03 RX ORDER — LIDOCAINE HYDROCHLORIDE 40 MG/ML
4 SOLUTION TOPICAL
Qty: 2 | Refills: 0 | Status: DISCONTINUED | COMMUNITY
Start: 2019-11-07 | End: 2020-09-03

## 2020-09-03 RX ORDER — ONDANSETRON 8 MG/1
8 TABLET, ORALLY DISINTEGRATING ORAL
Qty: 14 | Refills: 1 | Status: DISCONTINUED | COMMUNITY
Start: 2019-11-07 | End: 2020-09-03

## 2020-09-03 NOTE — REVIEW OF SYSTEMS
[Shortness Of Breath] : shortness of breath [Dyspnea on exertion] : dyspnea during exertion [Recent Weight Gain (___ Lbs)] : recent [unfilled] ~Ulb weight gain [Chest Pain] : chest pain [Abdominal Pain] : abdominal pain [Negative] : Heme/Lymph

## 2020-09-03 NOTE — PHYSICAL EXAM
[General Appearance - Well Developed] : well developed [General Appearance - Well Nourished] : well nourished [Normal Appearance] : normal appearance [Well Groomed] : well groomed [General Appearance - In No Acute Distress] : no acute distress [No Deformities] : no deformities [Normal Jugular Venous A Waves Present] : normal jugular venous A waves present [Heart Rate And Rhythm] : heart rate and rhythm were normal [No Jugular Venous Gonzalez A Waves] : no jugular venous gonzalez A waves [Normal Jugular Venous V Waves Present] : normal jugular venous V waves present [Heart Sounds] : normal S1 and S2 [Murmurs] : no murmurs present [Exaggerated Use Of Accessory Muscles For Inspiration] : no accessory muscle use [Respiration, Rhythm And Depth] : normal respiratory rhythm and effort [Auscultation Breath Sounds / Voice Sounds] : lungs were clear to auscultation bilaterally [Abdomen Tenderness] : non-tender [Abdomen Soft] : soft [Abdomen Mass (___ Cm)] : no abdominal mass palpated [Gait - Sufficient For Exercise Testing] : the gait was sufficient for exercise testing [Nail Clubbing] : no clubbing of the fingernails [Abnormal Walk] : normal gait [] : no ischemic changes [Cyanosis, Localized] : no localized cyanosis [Petechial Hemorrhages (___cm)] : no petechial hemorrhages

## 2020-09-04 LAB
ALBUMIN SERPL ELPH-MCNC: 4.4 G/DL
ALP BLD-CCNC: 60 U/L
ALT SERPL-CCNC: 20 U/L
ANION GAP SERPL CALC-SCNC: 12 MMOL/L
AST SERPL-CCNC: 16 U/L
BILIRUB SERPL-MCNC: 0.4 MG/DL
BUN SERPL-MCNC: 17 MG/DL
CALCIUM SERPL-MCNC: 9.3 MG/DL
CHLORIDE SERPL-SCNC: 106 MMOL/L
CO2 SERPL-SCNC: 19 MMOL/L
CREAT SERPL-MCNC: 0.75 MG/DL
GLUCOSE SERPL-MCNC: 66 MG/DL
POTASSIUM SERPL-SCNC: 4.2 MMOL/L
PROT SERPL-MCNC: 7.4 G/DL
SODIUM SERPL-SCNC: 137 MMOL/L

## 2020-09-04 NOTE — HISTORY OF PRESENT ILLNESS
[FreeTextEntry1] : This is the first outpatient visit for Ms. Lawrence. She has a h/o dysphagia, poor esophageal motility, s/p tracheobronchoplasty (2018), hypothyroid, obese. No personal h/o MI. FH of CAD, including father at age 52. No HTN. Mild hyperlipidemia, not on rx. Non-smoker. No DM.\par \par Echo 2018: Normal\par \par Ms. Lawrence presents for evaluation of chest pain. She notes about a 2 week h/o intermittent left sided, under left breast, discomfort along with a central chest pressure. Initial description occurred during gardening, and lasted about 1/2 of the day. She took some aspirin, and eventually went away. Now, she is getting this upper chest discomfort nearly daily, perhaps worse during physical activity, but has also noted it during rest. No change with deep breaths or position. No radiation to arms; sometimes feels pins/needles in fingers (both sides). She thinks there is an anxiety component to her symptoms. No nocturnal symptoms. She is always short of breath with exertion (chronic); unchanged. She states she is ~30 lbs overweight and she has not been working out lately. \par \par Recent labs:\par chol 238, trig 179, HDL 78,  mg/dL\par Other labs checked recently by endocrinologist

## 2020-09-18 ENCOUNTER — APPOINTMENT (OUTPATIENT)
Dept: CARDIOLOGY | Facility: CLINIC | Age: 50
End: 2020-09-18

## 2020-11-17 ENCOUNTER — RX RENEWAL (OUTPATIENT)
Age: 50
End: 2020-11-17

## 2021-03-11 ENCOUNTER — APPOINTMENT (OUTPATIENT)
Dept: GASTROENTEROLOGY | Facility: CLINIC | Age: 51
End: 2021-03-11
Payer: COMMERCIAL

## 2021-03-11 VITALS
WEIGHT: 150 LBS | OXYGEN SATURATION: 98 % | HEIGHT: 64 IN | HEART RATE: 69 BPM | TEMPERATURE: 97.7 F | BODY MASS INDEX: 25.61 KG/M2 | RESPIRATION RATE: 16 BRPM | SYSTOLIC BLOOD PRESSURE: 100 MMHG | DIASTOLIC BLOOD PRESSURE: 60 MMHG

## 2021-03-11 DIAGNOSIS — R13.19 OTHER DYSPHAGIA: ICD-10-CM

## 2021-03-11 PROCEDURE — 99214 OFFICE O/P EST MOD 30 MIN: CPT

## 2021-03-11 PROCEDURE — 99072 ADDL SUPL MATRL&STAF TM PHE: CPT

## 2021-03-15 DIAGNOSIS — Z01.812 ENCOUNTER FOR PREPROCEDURAL LABORATORY EXAMINATION: ICD-10-CM

## 2021-03-18 NOTE — HISTORY OF PRESENT ILLNESS
[FreeTextEntry1] : 50 with h/o confluent large inlet patch that was causing moderate dysphagia. This was ablated x 2 and this has significantly improved her ability to eat. She is now eating everything including salads. She is now having a mild cough which she is unsure what is causing it.\par She has mild GERD symptoms. She denies dyspepsia. No weight loss. Appetite is good and weight is stable and possibly elevated. She is trying to lose weight and PMD started her on Topamax.

## 2021-03-18 NOTE — PHYSICAL EXAM
[General Appearance - Alert] : alert [General Appearance - In No Acute Distress] : in no acute distress [General Appearance - Well Nourished] : well nourished [General Appearance - Well Developed] : well developed [Sclera] : the sclera and conjunctiva were normal [Neck Appearance] : the appearance of the neck was normal [Neck Cervical Mass (___cm)] : no neck mass was observed [Jugular Venous Distention Increased] : there was no jugular-venous distention [Exaggerated Use Of Accessory Muscles For Inspiration] : no accessory muscle use [Heart Sounds] : normal S1 and S2 [Edema] : there was no peripheral edema [Bowel Sounds] : normal bowel sounds [Abdomen Soft] : soft [Abdomen Tenderness] : non-tender [Cervical Lymph Nodes Enlarged Posterior Bilaterally] : posterior cervical [Cervical Lymph Nodes Enlarged Anterior Bilaterally] : anterior cervical [No CVA Tenderness] : no ~M costovertebral angle tenderness [No Spinal Tenderness] : no spinal tenderness [Nail Clubbing] : no clubbing  or cyanosis of the fingernails [] : no rash [No Focal Deficits] : no focal deficits [Oriented To Time, Place, And Person] : oriented to person, place, and time [Impaired Insight] : insight and judgment were intact [Affect] : the affect was normal

## 2021-03-18 NOTE — ASSESSMENT
[FreeTextEntry1] : starting to have a mid cough again\par previously had this and dysphagia with very large confluent inlet patches that were ablated\par This made a significant improvement\par May be related to new med (topamax)\par Plan for repeat EGD non urgently\par Refer to Dr. Donte Mcclure

## 2021-04-02 ENCOUNTER — APPOINTMENT (OUTPATIENT)
Dept: PULMONOLOGY | Facility: CLINIC | Age: 51
End: 2021-04-02

## 2021-04-06 NOTE — PHYSICAL THERAPY INITIAL EVALUATION ADULT - LIVES WITH, PROFILE
Patient Education   Here is the plan from today's visit    1. Gastroesophageal reflux disease with esophagitis without hemorrhage  - omeprazole (PRILOSEC) 20 MG DR capsule; Take 1 capsule (20 mg) by mouth daily  Dispense: 90 capsule; Refill: 1    2. Tobacco use disorder  - nicotine (NICORETTE) 2 MG gum; Place 1 each (2 mg) inside cheek as needed for smoking cessation  Dispense: 240 each; Refill: 3    3. Condyloma acuminatum in male  Come back for removal-these will need to be frozen off and can take several visits and treatments before the condyloma is healed.     4. Plantar fasciitis   Wear supportive shoes with heel inserts. Do morning exercises like flexing your feet and massaging the bottom of your foot against something like a soup can or rolling pin or baseball. This will loosen the fascia and help with long term pain relief. You can use ibuprofen to help with pain as well. Ice or heat can help provide comfort as well.       Please call or return to clinic if your symptoms don't go away.    Follow up plan  No follow-ups on file.    Thank you for coming to [facility] today.  Lab Testing:  **If you had lab testing today and your results are reassuring or normal they will be mailed to you or sent through [employer] within 7 days.   **If the lab tests need quick action we will call you with the results.  **If you are having labs done on a different day, please call [phone] to schedule at [facility] or [phone] for other [facility] locations.   The phone number we will call with results is # [phone] (home) . If this is not the best number please call our clinic and change the number.  Medication Refills:  If you need any refills please call your pharmacy and they will contact us.   If you need to  your refill at a new pharmacy, please contact the new pharmacy directly. The new pharmacy will help you get your medications transferred faster.   Scheduling:  If you have any  concerns about today's visit or wish to schedule another appointment please call our office during normal business hours [phone] (8-5:00 M-F)  If a referral was made to a [facility] and you don't get a call from central scheduling please call [phone].  If a Mammogram was ordered for you at [facility] call [phone] to schedule or change your appointment.  If you had an XRay/CT/Ultrasound/MRI ordered the number is [phone] to schedule or change your radiology appointment.   Medical Concerns:  If you have urgent medical concerns please call [phone] at any time of the day.    Griselda Smart MD       Patient Education     Plantar Fasciitis  Plantar fasciitis is a painful swelling of the plantar fascia. The plantar fascia is a thick, fibrous layer of tissue that covers the bones on the bottom of your foot. It supports the foot bones in an arched position.  Plantar fasciitis can happen gradually or suddenly. It usually affects one foot at a time. Heel pain can be sharp, like a knife sticking into the bottom of your foot. You may feel pain after exercising, long-distance jogging, stair climbing, long periods of standing, or after standing up.  Risk factors include: non-active lifestyle, arthritis, diabetes, obesity or recent weight gain, flat foot, high arch. Wearing high heels, loose shoes, or shoes with poor arch support for long periods of time adds to the risk. This problem is commonly found in runners and dancers. It also found in people who stand on hard surfaces for long periods of time.  Foot pain from this condition is usually worse in the morning. But it often improves with walking. By the end of the day there may be a dull aching. Treatment requires short-term rest and controlling swelling. It may take up to 9 months before all symptoms go away. Rarely, a steroid injection into the foot, or surgery, may be needed.  Home care    If you are overweight, lose  weight to help healing.    Choose supportive shoes with good arch support and shock absorbency. Replace athletic shoes when they become worn out. Don t walk or run barefoot.    Premade or custom-fitted shoe inserts may be helpful. Inserts made of silicone seem to be the most effective. Custom-made inserts can be provided by foot specialist, physical therapist, or orthopedist.    Premade or custom-made night splints keep the heel stretched out while you sleep. They may prevent morning pain.    Limit activities that stress the feet: jogging, prolonged standing or walking, contact sports, etc.    First thing in the morning and before sports, stretch the bottom of your feet. Gently flex your ankle so the toes move toward your knee.    Icing may help control heel pain. Apply an ice pack to the heel for 10 to 20 minutes as a preventive. Or ice your heel after a severe flare-up of symptoms. You may repeat this every 1 to 2 hours as needed.    You may use over-the-counter pain medicine to control pain, unless another medicine was prescribed. Anti-inflammatory pain medicines, such as ibuprofen or naproxen, may work better than acetaminophen. If you have chronic liver or kidney disease or ever had a stomach ulcer or gastrointestinal bleeding, talk with your healthcare provider before using these medicines.  Follow-up care  Follow up with your healthcare provider, or as advised.  Call for an appointment if pain worsens or there is no relief after a few weeks of home treatment. Shoe inserts, a night splint, or a special boot may be required.  If X-rays were taken, you will be told of any new findings that may affect your care.  When to seek medical advice  Call your healthcare provider right away if any of these occur:    Foot swelling    Redness or warmth with increasing pain  Rad last reviewed this educational content on 5/1/2018 2000-2020 [employer]. All rights reserved. This information is not  intended as a substitute for professional medical care. Always follow your healthcare professional's instructions.            spouse

## 2021-04-26 NOTE — ASU PATIENT PROFILE, ADULT - AS SC BRADEN NUTRITION
Reported NEGATIVE genetic testing results to Divya Love. She opted for 84 gene panel through [facility] with negative results for all genes ([facility] Multi-Cancer Panel). She was reassured to hear these results.  Reminded her that given her personal and family hist
(4) excellent

## 2021-05-07 ENCOUNTER — APPOINTMENT (OUTPATIENT)
Dept: PULMONOLOGY | Facility: CLINIC | Age: 51
End: 2021-05-07

## 2021-05-23 ENCOUNTER — APPOINTMENT (OUTPATIENT)
Dept: DISASTER EMERGENCY | Facility: CLINIC | Age: 51
End: 2021-05-23

## 2021-06-15 ENCOUNTER — APPOINTMENT (OUTPATIENT)
Dept: DISASTER EMERGENCY | Facility: CLINIC | Age: 51
End: 2021-06-15

## 2021-06-17 ENCOUNTER — TRANSCRIPTION ENCOUNTER (OUTPATIENT)
Age: 51
End: 2021-06-17

## 2021-06-17 LAB — SARS-COV-2 N GENE NPH QL NAA+PROBE: NOT DETECTED

## 2021-06-18 ENCOUNTER — OUTPATIENT (OUTPATIENT)
Dept: OUTPATIENT SERVICES | Facility: HOSPITAL | Age: 51
LOS: 1 days | Discharge: HOME | End: 2021-06-18

## 2021-06-18 VITALS
HEIGHT: 64 IN | RESPIRATION RATE: 15 BRPM | WEIGHT: 169.98 LBS | SYSTOLIC BLOOD PRESSURE: 111 MMHG | DIASTOLIC BLOOD PRESSURE: 74 MMHG | HEART RATE: 71 BPM | TEMPERATURE: 98 F | OXYGEN SATURATION: 100 %

## 2021-06-18 VITALS
DIASTOLIC BLOOD PRESSURE: 60 MMHG | SYSTOLIC BLOOD PRESSURE: 100 MMHG | RESPIRATION RATE: 18 BRPM | OXYGEN SATURATION: 97 %

## 2021-06-18 DIAGNOSIS — M75.02 ADHESIVE CAPSULITIS OF LEFT SHOULDER: ICD-10-CM

## 2021-06-18 DIAGNOSIS — Z86.018 PERSONAL HISTORY OF OTHER BENIGN NEOPLASM: Chronic | ICD-10-CM

## 2021-06-18 DIAGNOSIS — Z98.51 TUBAL LIGATION STATUS: Chronic | ICD-10-CM

## 2021-06-18 DIAGNOSIS — Z98.890 OTHER SPECIFIED POSTPROCEDURAL STATES: Chronic | ICD-10-CM

## 2021-06-18 DIAGNOSIS — Z90.49 ACQUIRED ABSENCE OF OTHER SPECIFIED PARTS OF DIGESTIVE TRACT: Chronic | ICD-10-CM

## 2021-06-18 DIAGNOSIS — Z98.49 CATARACT EXTRACTION STATUS, UNSPECIFIED EYE: Chronic | ICD-10-CM

## 2021-06-18 RX ORDER — HYDROMORPHONE HYDROCHLORIDE 2 MG/ML
0.5 INJECTION INTRAMUSCULAR; INTRAVENOUS; SUBCUTANEOUS
Refills: 0 | Status: DISCONTINUED | OUTPATIENT
Start: 2021-06-18 | End: 2021-06-19

## 2021-06-18 RX ORDER — SODIUM CHLORIDE 9 MG/ML
1000 INJECTION, SOLUTION INTRAVENOUS
Refills: 0 | Status: DISCONTINUED | OUTPATIENT
Start: 2021-06-18 | End: 2021-06-19

## 2021-06-18 RX ORDER — ALPRAZOLAM 0.25 MG
0 TABLET ORAL
Qty: 0 | Refills: 0 | DISCHARGE

## 2021-06-18 RX ORDER — PANTOPRAZOLE SODIUM 20 MG/1
1 TABLET, DELAYED RELEASE ORAL
Qty: 0 | Refills: 0 | DISCHARGE

## 2021-06-18 RX ORDER — ONDANSETRON 8 MG/1
4 TABLET, FILM COATED ORAL ONCE
Refills: 0 | Status: DISCONTINUED | OUTPATIENT
Start: 2021-06-18 | End: 2021-06-19

## 2021-06-18 RX ADMIN — SODIUM CHLORIDE 125 MILLILITER(S): 9 INJECTION, SOLUTION INTRAVENOUS at 12:57

## 2021-06-18 NOTE — H&P ADULT - HISTORY OF PRESENT ILLNESS
Pt is a 50 yo female who developed left shoulder pain in february. Pt notes limited ROM shoulder and pain that failed conservative treatment modalities. Pt is LHD.

## 2021-06-18 NOTE — H&P ADULT - NSICDXPASTSURGICALHX_GEN_ALL_CORE_FT
PAST SURGICAL HISTORY:  H/O arthroscopy of right knee     H/O bilateral breast reduction surgery     H/O parotidectomy     H/O tubal ligation bilateral    History of abdominoplasty 2012, mini    History of bladder surgery sling    History of carpal tunnel release left    History of cataract surgery bilateral    History of lipoma removed from left shoulder    History of parotidectomy left    S/P tracheoplasty 2018 can use size 7 ET tube "I have been told I am totally fine now"    Status post arthroscopy of hip left    Status post right breast lumpectomy 1996

## 2021-06-18 NOTE — BRIEF OPERATIVE NOTE - OPERATION/FINDINGS
See op report    S/P Right shoulder arthroscopic glenohumerual debridement, subacromial decompression, distal clavicle resection

## 2021-06-18 NOTE — H&P ADULT - NSHPPHYSICALEXAM_GEN_ALL_CORE
PHYSICAL EXAM:      Constitutional: Sitting comfortably in NAD    Eyes: EOMI    ENMT: Symmetrical    Neck: No JVD. No bruit.     Back: No kyphosis    Respiratory: CTA B/L  No W/R/R    Cardiovascular: RRR    Gastrointestinal: Soft, NT/ND.     Extremities: Limited ROM left shoulder abduction/forward flexion. +ROM elbow/wrist/digits B/L. Sensation/motor intact B/L digits/wrists. RP 2+ B/L.     Neurological: AAOX3    Psychiatric: Appropriate affect.

## 2021-06-18 NOTE — ASU PREOP CHECKLIST - BMI (KG/M2)
Encounter Date: 8/15/2019       History     Chief Complaint   Patient presents with    Chest Pain     L sided since Friday but intermittent since Sunday, hx 2 stents placed in 2012. been taking baby aspirin at home, described as dull, denies aggravating/alleviating factors     [age] Year old male presents with intermittent chest discomfort for the past 6 days.  He describes it as a dull pressure-like discomfort in his left chest.  It is no focal area.  It is intermittent and comes without any pattern.  It is not exertional.  It is not positional.  It is not made worse with movement or breathing.  Currently he has no discomfort.  There is no associated shortness of breath. It feels like a mild version of his heart attack.  He had an MI in 2012.  He received 1 stent then.  He had a 2nd stent a year later.  This was done in [place].  He takes an aspirin a day but not consistently.  He no longer takes his metoprolol and statin.  This was done on his own.  He has not been under the care of a physician.  Over the past several years he has lost 50 lb by following a diet.  He does not smoke.          Review of patient's allergies indicates:  No Known Allergies  Past Medical History:   Diagnosis Date    Essential hypertension 8/15/2019    Ingrown toenail     S/P coronary artery stent placement 2012     No past surgical history on file.  Family History   Problem Relation Age of Onset    Stroke Mother     Heart disease Mother     Heart disease Father      Social History     Tobacco Use    Smoking status: Not on file   Substance Use Topics    Alcohol use: Not on file    Drug use: Not on file     Review of Systems   Constitutional: Negative for chills and fever.   HENT: Negative for congestion.    Eyes: Negative for pain.   Respiratory: Negative for shortness of breath.    Cardiovascular: Positive for chest pain. Negative for palpitations.   Gastrointestinal: Negative for abdominal pain.   Endocrine: Negative  for cold intolerance and heat intolerance.   Genitourinary: Negative for difficulty urinating.   Musculoskeletal: Negative for arthralgias.   Skin: Negative for rash.   Neurological: Negative for dizziness.   Psychiatric/Behavioral: Negative for agitation.       Physical Exam     Initial Vitals [08/15/19 0630]   BP Pulse Resp Temp SpO2   (!) 163/101 75 18 98.4 °F (36.9 °C) 98 %      MAP       --         Physical Exam    Vitals reviewed.  Constitutional: He appears well-developed and well-nourished.   HENT:   Head: Normocephalic.   Eyes: EOM are normal. Pupils are equal, round, and reactive to light.   Neck: Normal range of motion. Neck supple.   Cardiovascular: Normal rate, regular rhythm, normal heart sounds and intact distal pulses.   No murmur heard.  Pulmonary/Chest: Breath sounds normal.   Abdominal: Soft. Bowel sounds are normal.   Musculoskeletal: Normal range of motion. He exhibits no edema.   Neurological: He is alert. He has normal strength. GCS score is 15. GCS eye subscore is 4. GCS verbal subscore is 5. GCS motor subscore is 6.   Skin: Skin is warm. Capillary refill takes less than 2 seconds. No rash noted.   Psychiatric: He has a normal mood and affect.         ED Course   Procedures  Labs Reviewed   COMPREHENSIVE METABOLIC PANEL - Abnormal; Notable for the following components:       Result Value    CO2 22 (*)     All other components within normal limits   LIPID PANEL - Abnormal; Notable for the following components:    Cholesterol 222 (*)     Triglycerides 189 (*)     Hdl/Cholesterol Ratio 18.9 (*)     Total Cholesterol/HDL Ratio 5.3 (*)     All other components within normal limits    Narrative:     add on lipid perdr. anna pa-c @ 10:46  08/15/2019 [id number]  ADD-ON ORDER [facility] #[id number]; PER MAHAD COOPER PA-C 08/15/2019    10:58    CBC W/ AUTO DIFFERENTIAL   TROPONIN I   B-TYPE NATRIURETIC PEPTIDE   LIPID PANEL   HEMOGLOBIN A1C   HEMOGLOBIN A1C    Narrative:     add on lipid per, dr.  anna ho pa-c @ 10:46  08/15/2019 [id number]  ADD-ON ORDER [facility] #[id number]; PER MAHAD HO PA-C 08/15/2019    10:58    TROPONIN I   TROPONIN I     EKG Readings: (Independently Interpreted)   Normal sinus rhythm at 74 with right bundle branch block.  Inferior Q-waves.  No acute ischemic changes.  No prior EKG available.     ECG Results          EKG 12-lead (In process)  Result time 08/15/19 09:18:04    In process by Interface, Lab In [facility] (08/15/19 09:18:04)                 Narrative:    Test Reason : R07.9    Vent. Rate : 074 BPM     Atrial Rate : 074 BPM     P-R Int : 158 ms          QRS Dur : 146 ms      QT Int : 396 ms       P-R-T Axes : 032 065 -01 degrees     QTc Int : 439 ms    Normal sinus rhythm  Right bundle branch block  Inferior infarct ,age undetermined  Anterolateral infarct ,age undetermined  Abnormal ECG  No previous ECGs available    Referred By: [email or web address]   SELF           Confirmed By:                             Imaging Results          X-Ray Chest AP Portable (Final result)  Result time 08/15/19 07:49:10    Final result by Feliz Mathew MD (08/15/19 07:49:10)                 Impression:      No acute abnormality.      Electronically signed by: Feliz Mathew MD  Date:    08/15/2019  Time:    07:49             Narrative:    EXAMINATION:  XR CHEST AP PORTABLE    CLINICAL HISTORY:  Chest Pain;.    TECHNIQUE:  Single frontal portable view of the chest was performed.    COMPARISON:  None    FINDINGS:  Support devices: None    The lungs are clear, with normal appearance of pulmonary vasculature and no pleural effusion or pneumothorax.    The cardiac silhouette is normal in size. The hilar and mediastinal contours are unremarkable.    Bones are intact.                                 Medical Decision Making:   Initial Assessment:   Patient with concerning chest discomfort.  Currently he is asymptomatic.  Will do a cardiac workup and discuss with Cardiology  Independently Interpreted Test(s):    I have ordered and independently interpreted EKG Reading(s) - see prior notes  Clinical Tests:   Lab Tests: Ordered and Reviewed  Radiological Study: Ordered and Reviewed  Medical Tests: Ordered and Reviewed                      Clinical Impression:       ICD-10-CM ICD-9-CM   1. Chest pain R07.9 786.50   2. CAD (coronary artery disease) I25.10 414.00   3. Chest pain, unspecified type R07.9 786.50   4. Coronary artery disease involving native coronary artery of native heart without angina pectoris I25.10 414.01         Disposition:   Disposition: Placed in Observation  Condition: Stable                        Timur Ha MD  08/15/19 1440     29.2

## 2021-06-18 NOTE — H&P ADULT - NSICDXPASTMEDICALHX_GEN_ALL_CORE_FT
PAST MEDICAL HISTORY:  Allergic rhinitis, seasonal     Anxiety     Chronic cough     Encounter for cosmetic surgery Breast Reduction/Abdominoplasty    Esophageal dysmotility     GERD (gastroesophageal reflux disease)     Hypothyroidism     IBS (irritable bowel syndrome)     Low serum vitamin D     Tracheomalacia corrected in 2018

## 2021-06-24 DIAGNOSIS — M24.112 OTHER ARTICULAR CARTILAGE DISORDERS, LEFT SHOULDER: ICD-10-CM

## 2021-06-24 DIAGNOSIS — M19.012 PRIMARY OSTEOARTHRITIS, LEFT SHOULDER: ICD-10-CM

## 2021-06-24 DIAGNOSIS — M75.52 BURSITIS OF LEFT SHOULDER: ICD-10-CM

## 2021-06-24 DIAGNOSIS — E03.9 HYPOTHYROIDISM, UNSPECIFIED: ICD-10-CM

## 2021-06-24 DIAGNOSIS — J44.9 CHRONIC OBSTRUCTIVE PULMONARY DISEASE, UNSPECIFIED: ICD-10-CM

## 2021-06-24 DIAGNOSIS — F32.9 MAJOR DEPRESSIVE DISORDER, SINGLE EPISODE, UNSPECIFIED: ICD-10-CM

## 2021-06-24 DIAGNOSIS — M75.02 ADHESIVE CAPSULITIS OF LEFT SHOULDER: ICD-10-CM

## 2021-06-24 DIAGNOSIS — K21.9 GASTRO-ESOPHAGEAL REFLUX DISEASE WITHOUT ESOPHAGITIS: ICD-10-CM

## 2021-07-15 ENCOUNTER — NON-APPOINTMENT (OUTPATIENT)
Age: 51
End: 2021-07-15

## 2021-07-15 ENCOUNTER — APPOINTMENT (OUTPATIENT)
Dept: CARDIOLOGY | Facility: CLINIC | Age: 51
End: 2021-07-15
Payer: COMMERCIAL

## 2021-07-15 VITALS
HEIGHT: 64 IN | OXYGEN SATURATION: 99 % | BODY MASS INDEX: 27.31 KG/M2 | SYSTOLIC BLOOD PRESSURE: 120 MMHG | DIASTOLIC BLOOD PRESSURE: 75 MMHG | WEIGHT: 160 LBS | HEART RATE: 71 BPM

## 2021-07-15 PROCEDURE — 99214 OFFICE O/P EST MOD 30 MIN: CPT

## 2021-07-15 PROCEDURE — 93000 ELECTROCARDIOGRAM COMPLETE: CPT

## 2021-07-15 PROCEDURE — 99072 ADDL SUPL MATRL&STAF TM PHE: CPT

## 2021-07-15 RX ORDER — TOPIRAMATE 50 MG/1
50 TABLET, COATED ORAL TWICE DAILY
Refills: 0 | Status: DISCONTINUED | COMMUNITY
End: 2021-07-15

## 2021-07-15 NOTE — REVIEW OF SYSTEMS
[Weight Gain (___ Lbs)] : [unfilled] ~Ulb weight gain [Negative] : Heme/Lymph [Fever] : no fever [Chills] : no chills

## 2021-07-15 NOTE — HISTORY OF PRESENT ILLNESS
[FreeTextEntry1] : This is a followup visit for Ms. Lawrence. She has a h/o dysphagia, poor esophageal motility, s/p tracheo- bronchoplasty (2018) with baseline SOB, hypothyroid on rx, obese, anxiety. No personal h/o MI. FH of CAD, including father at age 52. No HTN. Mild hyperlipidemia, not on rx. Non-smoker. No DM.\par \par Echo 2019: Normal\par \par Ms. Lawrence presented om Sept 2020 for evaluation of chest pain. She noted about a 2 week h/o intermittent left sided, under left breast, discomfort along with a central chest pressure. Initial description occurred during gardening, and lasted about 1/2 of the day. \par \par At her last visit in Sept 2020, the following issues were discussed:\par Chest pain (786.50) (R07.9)\par  · Somewhat atypical chest symptoms. Limited cardiac risk factors. Suggest further\par     evaluation, and discussed imaging strategies. Will pursue CTA coronaries to define\par     coronary anatomy. If symptoms change / worsen, she was instructed to go\par     to the closest ED. Will be in touch with her after the CT about\par     next steps.\par Hyperlipemia (272.4) (E78.5)\par  · Discussed need to alter diet/improve activity. She might warrant medical therapy at some\par     point as well.\par \par She had her cardiac CT on 9/15/2020:\par no coronary calcium\par LVEF 60%\par mild atherosclerosis; nothing flow-limiting\par \par Prior labs:\par Normal renal function, electrolytes, LFT's\par tchol 238, HDL 78, , trig 179 mg/dL\par \par More recent labs 5/2021:\par creat 0.83, LFT's normal\par chol 288, HDL 82, trig 130,  mg/dL\par Alc 5.6%\par \par EKG today:\par Sinus rhythm \par Non-specific T-waves\par Borderline ECG\par \par Today, she feels her heart "racing" at time (not irregular), which can happen at any time. No obvious precipitant; perhaps a bit worse with exertion. She has noted this nearly every day. She has baseline ALEXIS (s/p tracheobron). No worrisome chest pain. No LLE swelling. She is not exercising much; occasional walks. S/p recent left shoulder surgery. Diet not necessarily heart healthy.

## 2021-07-22 ENCOUNTER — APPOINTMENT (OUTPATIENT)
Dept: PULMONOLOGY | Facility: CLINIC | Age: 51
End: 2021-07-22
Payer: COMMERCIAL

## 2021-07-22 ENCOUNTER — NON-APPOINTMENT (OUTPATIENT)
Age: 51
End: 2021-07-22

## 2021-07-22 VITALS
RESPIRATION RATE: 16 BRPM | SYSTOLIC BLOOD PRESSURE: 130 MMHG | OXYGEN SATURATION: 99 % | TEMPERATURE: 97.3 F | HEIGHT: 64 IN | BODY MASS INDEX: 28.17 KG/M2 | WEIGHT: 165 LBS | HEART RATE: 77 BPM | DIASTOLIC BLOOD PRESSURE: 80 MMHG

## 2021-07-22 PROCEDURE — 95012 NITRIC OXIDE EXP GAS DETER: CPT

## 2021-07-22 PROCEDURE — 99214 OFFICE O/P EST MOD 30 MIN: CPT | Mod: 25

## 2021-07-22 PROCEDURE — 99072 ADDL SUPL MATRL&STAF TM PHE: CPT

## 2021-07-22 PROCEDURE — 94010 BREATHING CAPACITY TEST: CPT

## 2021-07-22 PROCEDURE — 94618 PULMONARY STRESS TESTING: CPT

## 2021-07-22 NOTE — HISTORY OF PRESENT ILLNESS
[FreeTextEntry1] : Ms. Lawrence is a 51 year old female with a history of allergic rhinitis, chronic cough, dysphagia, elevated IgE, GERD, low vitamin D, OW, SOB, and tracheomalacia s/p tracheoplasty presenting to the office today for a follow up visit. Her chief complaint is SOB / difficulty swallowing.\par -She is doing well in general\par -She notes having shoulder surgery 4 weeks ago \par -She notes getting hurt at PT last week and is currently not doing physical activity \par -she notes coughing when eating, which brought on residual wheezing, and has her reflux monitored by Dr. Gonzalez\par -She is now on pepcid and pantoprazole which controls her reflux and heartburn \par -she notes having SOB since her surgery, especially when bending down which brings on dizziness\par -She notes recently being put on Atorvastatin last week by her cardiologist, and is now wearing a heart monitor\par -She notes having palpitations intermittently throughout the day \par -She has recently gained weight\par -She is unable to tolerate the heat in the summer outdoors, which brings \par -s/p covid 19 vaccine x2 \par -She notes having issues with steroids, and is currently having issues due to recent prescription of steroids\par -She denies snoring \par -She notes feeling well rested when waking up in the morning \par -She notes still having ALEXIS \par \par - patient denies any headaches, nausea, vomiting, fever, chills, sweats, chest pain, chest pressure, palpitations, fatigue, diarrhea, constipation, dysphagia, myalgias, leg swelling, leg pain, itchy eyes, itchy ears, heartburn, reflux or sour taste in the mouth \par

## 2021-07-22 NOTE — PROCEDURE
[FreeTextEntry1] : Feno was 11; a normal value being less than 25. Fractional exhaled nitric oxide (FENO) is regarded as a simple, noninvasive method for assessing eosinophilic airway inflammation. Produced by a variety of cells within the lung, nitric oxide (NO) concentrations are generally low in healthy individuals. However, high concentrations of NO appear to be involved in nonspecific host defense mechanisms and chronic inflammatory  diseases such as asthma. The American Thoracic Society (ATS) therefore recommended using FENO to aid in the diagnosis and monitoring of eosinophilic airway inflammation and asthma, and for identifying steroid responsive individuals whose chronic respiratory symptoms may be caused by airway inflammation \par \par PFT revealed normal flows, with a FEV1 of 2.32L, which is 85% of predicted, with a normal flow volume loop\par  \par Cardiac CT(9/15/2021) reveals atherosclerotic changes as described with no hemodynamically significant stenosis identified.\par \par -Images and procedures reviewed in detail and discussed with patient.

## 2021-07-22 NOTE — PHYSICAL EXAM
[No Acute Distress] : no acute distress [Normal Oropharynx] : normal oropharynx [III] : Mallampati Class: III [Normal Appearance] : normal appearance [No Neck Mass] : no neck mass [Normal Rate/Rhythm] : normal rate/rhythm [Normal S1, S2] : normal s1, s2 [No Murmurs] : no murmurs [No Resp Distress] : no resp distress [Clear to Auscultation Bilaterally] : clear to auscultation bilaterally [No Abnormalities] : no abnormalities [Benign] : benign [Normal Gait] : normal gait [No Clubbing] : no clubbing [No Cyanosis] : no cyanosis [No Edema] : no edema [Normal Color/ Pigmentation] : normal color/ pigmentation [FROM] : FROM [No Focal Deficits] : no focal deficits [Oriented x3] : oriented x3 [Normal Affect] : normal affect [TextBox_68] : I:E 1:3; Clear

## 2021-07-22 NOTE — ASSESSMENT
[FreeTextEntry1] : Ms. Lawrence is a 52 y/o female with a history of hypothyroidism, IBS, constipation, pneumococcal PNA, who now comes in for pulmonary re-evaluation s/p tracheoplasty - residual cough / issues swallowing solids / SOB.\par \par Her SOB is multifactorial:\par -overweight / out of shape\par -poor breathing mechanics\par -asthma\par -?tracheomalacia\par -cardiac disease (pericarditis, PAH)\par -sarcoidosis\par \par problem 1: overweight\par Weight loss, exercise, and diet control were discussed and are highly encouraged. Treatment options were given such as, aqua therapy, and contacting a nutritionist. Recommended to use the elliptical, stationary bike, less use of treadmill. Mindful eating was explained to the patient Obesity is associated with worsening asthma, shortness of breath, and potential for cardiac disease, diabetes, and other underlying medical conditions. \par \par problem 2: poor breathing mechanics\par -Proper breathing techniques were reviewed with an emphasis of exhalation. Patient instructed to breath in for 1 second and out for four seconds. Patient was encouraged to not talk while walking.\par \par problem 3: tracheomalacia- (Test Results Positive)\par -Dynamic chest CT (rescripted)\par -She is s/p tracheoplasty\par Tracheomalacia is usually acquired in adults and common causes include damage by tracheostomy or endotracheal intubation damaging the tracheal cartilage with increase risk with multiple intubations, prolonged intubation, and concurrent high dose steroid therapy; external chest wall trauma and surgery; chronic compression of the trachea by benign etiologies (eg, benign mediastinal goiter) or malignancy; relapsing polychondritis; or recurrent infection. Tracheomalacia can be asymptomatic, however signs or symptoms can develop as the severity of the airway narrowing progresses with major symptoms include dyspnea, cough, and sputum retention. Other symptoms include severe paroxysms of coughing, wheezing or stridor, barking cough and may be exacerbated by forced expiration, cough, and valsalva maneuver. Tracheomalacia is diagnosed by a bronchoscopic visualization of dynamic airway collapse on dynamic chest CT. Therapy is warranted in symptomatic patients with severe tracheomalacia and includes surgical repair as tracheobronchoplasty. The patient was referred to Dr. Nathaniel Goodson or Dr. Mal Joyce, at [facility] for a surgical consult. \par \par Problem 4: Sensory Neuropathic Cough\par - off Neurontin 100 mg Q8H\par -Sensory neuropathic cough is an etiology of cough that is often realized once someone has been ruled out for common disease such as: asthma, COPD, eosinophilic bronchitis, bronchiectasis, post nasal drip, and GERD. It sometimes develops following a URI, herpes zoster outbreak in pharynx or thyroid or cervical spine injury. However, many patients have no identifiable antecedent explanation. \par \par problem 5: chronic cough\par -associated with PND, asthma, GERD, TBM, cardiac disease, sarcoidosis\par -obtain blood work asthma profile, IgE level (Elevated) eosinophil level, vitamin D level, ESR level, ACE level, pertussis titer, chlamydia pneumoniae\par \par Problem 6: Elevated IgE Level\par - She is a candidate for Xolair if symptoms progress\par -Xolair is a recombinant DNA- derived humanized IgG1K monoclonal antibody that selectively binds ot human immunoglobulin E (IgE). Xolair is produced by a Chinese hamster ovary cell suspension culture in nutrient medium containing the antibiotic gentamicin. Gentamicin is not detectable in the final product. Xolair is a sterile, white, preservative free, lyophilized powder contained in a single use vial that is reconstituted with sterile water for suspension. Side effects include: wheezing, tightness of the chest, trouble breathing, hives, skin rash, feeling anxious or light-headed, fainting, warmth or tingling under skin, or swelling of face, lips, or tongue \par \par problem 7: GERD - (?achalasia / ?esophageal dysmotility)\par -Continue GI follow up with Dr. Gonzalez\par -continue Protonix 40 mg QAM, pre-breakfast\par -continue Pepcid 40 mg QHS \par -continue to f/u with Dr. Gonzalez / Saritha\par -Rule of 2's- Avoid eating too much, too late, too poorly, too spicy, or two hours before bed \par -Things to avoid including overeating, spicy foods, tight clothing, eating within three hours of bed, this list is not all inclusive. \par -For treatment of reflux, possible options discussed including diet control, H2 blockers, PPIs, as well as coating motility agents discussed as treatment options. Timing of meals and proximity of last meal to sleep were discussed. If symptoms persist, a formal gastrointestinal evaluation is needed.\par \par Problem 8: Allergic Rhinitis\par -continue Clarinex 5 mg QHS\par Environmental measures for allergies were encouraged including mattress and pillow cover, air purifier, and environmental controls.\par \par problem 9: low vitamin D\par -start supplemental Vitamin D\par -Has been associated with asthma exacerbations and increased allergic symptoms. The goal based on recent information is maintaining levels between 50-70 and low normal is 30. Recommended 50,000 units every two weeks to once a month depending on the level.\par \par problem 10: cardiac disease\par -f/u with [facility] (Dr. Montesinos)\par \par problem 11: r/o asthma\par -MCT unable at this time\par - Add Singulair 10 mg QHS\par -Add Breztri 2 puffs BID \par \par \par -Inhaler technique reviewed as well as oral hygiene techniques reviewed with patient. Avoidance of cold air, extremes of temperature, rescue inhaler should be used before exercise. Order of medication reviewed with patient. Recommended use of a cool mist humidifier in the bedroom. \par \par Asthma is believed to be caused by inherited (genetic) and environmental factor, but its exact cause is unknown. Asthma may be triggered by allergens, lung infections, or irritants in the air. Asthma triggers are different for each person.\par \par problem 9: poor sleep (denies snoring)\par -set up home sleep study if worsening\par \par Good sleep hygiene was encouraged including avoiding watching television an hour before bed, keeping caffeine at a low, avoiding reading, television, or anything, in bed, no drinking any liquids three hours before bedtime, and only getting into bed when tired and ready for sleep. \par \par problem 10: health maintenance\par -s/p covid 19 vaccine x2 \par -recommended a yearly flu shot after October 15\par -recommended strep pneumonia vaccines: Prevnar-13 vaccine, followed by Pneumo vaccine 23 on year following\par -recommended early intervention for URIs\par -recommended osteoporosis evaluations\par -recommended early dermatological evaluations\par -recommended after the age of 50 to the age of 70, colonoscopy every 5 years\par -encouraged early intervention\par \par Follow up in 3 months - SPI / [facility]IOX\par The patient was encouraged to call with any changes, concerns, or questions.

## 2021-07-22 NOTE — ADDENDUM
[FreeTextEntry1] : Documented by Bishop Alves acting as a scribe for Dr. Jack Summers on (07/22/2021).\par \par All medical record entries made by the Scribe were at my, Dr. Jack Summers's, direction and personally dictated by me on (07/22/2021). I have reviewed the chart and agree that the record accurately reflects my personal performance of the history, physical exam, assessment and plan. I have also personally directed, reviewed, and agree with the discharge instructions.\par

## 2021-08-02 ENCOUNTER — APPOINTMENT (OUTPATIENT)
Dept: CT IMAGING | Facility: IMAGING CENTER | Age: 51
End: 2021-08-02

## 2021-08-12 ENCOUNTER — NON-APPOINTMENT (OUTPATIENT)
Age: 51
End: 2021-08-12

## 2021-08-18 ENCOUNTER — OUTPATIENT (OUTPATIENT)
Dept: OUTPATIENT SERVICES | Facility: HOSPITAL | Age: 51
LOS: 1 days | End: 2021-08-18
Payer: COMMERCIAL

## 2021-08-18 ENCOUNTER — APPOINTMENT (OUTPATIENT)
Dept: CT IMAGING | Facility: IMAGING CENTER | Age: 51
End: 2021-08-18
Payer: COMMERCIAL

## 2021-08-18 DIAGNOSIS — Z86.018 PERSONAL HISTORY OF OTHER BENIGN NEOPLASM: Chronic | ICD-10-CM

## 2021-08-18 DIAGNOSIS — Z00.8 ENCOUNTER FOR OTHER GENERAL EXAMINATION: ICD-10-CM

## 2021-08-18 DIAGNOSIS — Z98.890 OTHER SPECIFIED POSTPROCEDURAL STATES: Chronic | ICD-10-CM

## 2021-08-18 DIAGNOSIS — Z98.49 CATARACT EXTRACTION STATUS, UNSPECIFIED EYE: Chronic | ICD-10-CM

## 2021-08-18 DIAGNOSIS — J39.8 OTHER SPECIFIED DISEASES OF UPPER RESPIRATORY TRACT: ICD-10-CM

## 2021-08-18 DIAGNOSIS — Z90.49 ACQUIRED ABSENCE OF OTHER SPECIFIED PARTS OF DIGESTIVE TRACT: Chronic | ICD-10-CM

## 2021-08-18 DIAGNOSIS — Z98.51 TUBAL LIGATION STATUS: Chronic | ICD-10-CM

## 2021-08-18 PROCEDURE — 71250 CT THORAX DX C-: CPT

## 2021-08-18 PROCEDURE — 71250 CT THORAX DX C-: CPT | Mod: 26

## 2021-08-20 ENCOUNTER — NON-APPOINTMENT (OUTPATIENT)
Age: 51
End: 2021-08-20

## 2021-08-26 ENCOUNTER — NON-APPOINTMENT (OUTPATIENT)
Age: 51
End: 2021-08-26

## 2021-09-02 ENCOUNTER — APPOINTMENT (OUTPATIENT)
Dept: THORACIC SURGERY | Facility: CLINIC | Age: 51
End: 2021-09-02
Payer: COMMERCIAL

## 2021-09-03 ENCOUNTER — APPOINTMENT (OUTPATIENT)
Dept: THORACIC SURGERY | Facility: CLINIC | Age: 51
End: 2021-09-03
Payer: COMMERCIAL

## 2021-09-03 DIAGNOSIS — R05 COUGH: ICD-10-CM

## 2021-09-03 PROCEDURE — 99442: CPT

## 2021-09-03 NOTE — REASON FOR VISIT
[Home] : at home, [unfilled] , at the time of the visit. [Medical Office: ([facility])___] : at the medical office located in

## 2021-09-04 ENCOUNTER — LABORATORY RESULT (OUTPATIENT)
Age: 51
End: 2021-09-04

## 2021-09-11 NOTE — ASSESSMENT
[FreeTextEntry1] : 51 year old female with history of cough, pertussis, GERD, recent diagnosis of SVT, HLD. She is status post Right VATS, robotic assisted, tracheobronchoplasty with Prolene mesh on 3/12/18. Now with recurrent cough. She presents today to review Dynamic CT chest revealing TBM. She was initially referred by Dr. Summers.\par \par Today patient reports worsening SOB on exertion. Underwent evaluation with cardiology who recommended Metoprolol for SVT. Patient has not started this yet. \par \par 8/18/2021 CT CHEST was reviewed which revealed\par Comparison: January 17, 2018\par Lungs/Pleura/Airways: No consolidations, edema, effusion or pneumothorax. \par On expiratory imaging there is near complete collapse of the posterior membrane of the trachea and bilateral mainstem bronchi\par -Findings consistent with tracheobronchomalacia. \par \par Discussed that a CT Scan can over or underestimate degree of collapse. Will set up for awake bronchoscopy for further evaluation. After bronchoscopy, will make sure tachycardia is under control and  then she should follow up with Dr. Gonzalez for possible EGD.\par \par The patient was advised on the implications of awake bronchoscopy under local anesthetic. The patient was counseled on the risks, benefits and alternatives of proceeding with this procedure. Risk of bleeding, need for transfusion, nerve injury, and need for additional testing, biopsy and/or treatment discussed.\par \par She will need medical clearance and PST labs, UA, EKG.\par \par PLAN:\par 1. Awake dynamic bronchoscopy\par 2.Medical clearance PST labs, EKG, COVID test\par I, Nathaniel Goodson, have reviewed the documentation recorded by the scribe in my presence and it accurately and completely records my words and actions.\par \par \par \par

## 2021-09-11 NOTE — END OF VISIT
[FreeTextEntry3] : I, JOSE CABALLERO , am scribing for and in the presence of NAVYA MOISE the following sections: history of present illness, past medical/family/surgical/family/social history, review of systems, vital signs, physical exam, and disposition.\par \par

## 2021-09-11 NOTE — HISTORY OF PRESENT ILLNESS
[FreeTextEntry1] : 51 year old female with history of cough, pertussis, GERD, recent diagnosis of SVT, HLD. She is status post Right VATS, robotic assisted, tracheobronchoplasty with Prolene mesh on 3/12/18. Now with recurrent cough. She presents today to review Dynamic CT chest revealing TBM. She was initially referred by Dr. Summers.\par \par Postoperatively, patient complained of persistent, dry cough. Guaifenesin -Codeine 100-10mg/5 ml was prescribed. She was started on Elavil and her dose was titrated up to 40 mg daily at bedtime, however she complained of daytime fatigue and sleepiness without any significant relief of her cough. She continued taking guaifenesin -Codeine 100-10mg/5 ml as she reported that was the only medication which relieved her cough. She was ultimately weaned off both the Elavil and cough syrup and started on Gabapentin 100mg 3x/day. \par \par Spirometry completed on 6/18/18 showed FEV1 2.54 = , 91% of predicted value, FVC =2.87 , 81% of predicted value, PEF =5.29 ,79 % of predicted value \par \par Upper GI series esophagram completed on 11/23/18:\par -normal esophagram and unremarkable upper GI series\par \par Manometry testing completed on 03/29/19:\par -DeMeester score of 17.5\par -patient had significant symptoms association probability with cough during the study\par \par Repeat Manometry testing completed on 05/01/19:\par Basal LES: 31.4\par Basal UES: 200.5 (elevated)\par \par Residual LES: 14.4\par Residual UES: 0.9\par \par -Normal basal EGJ pressures with complete deglutitive relaxation \par -65% of swallows demonstrate weak or absent peristalsis, with 15% of swallows demonstrate absence of primary peristalsis\par -15% of swallows demonstrate normal amplitude peristalsis\par -60% of swallows demonstrate complete bolus clearance by impedance analysis \par -manometric evidence of 0.8cm hiatal hernia \par \par 8/18/2021 CT CHEST\par Comparison: January 17, 2018\par Lungs/Pleura/Airways: No consolidations, edema, effusion or pneumothorax. \par On expiratory imaging there is near complete collapse of the posterior membrane of the trachea and bilateral mainstem bronchi\par -Findings consistent with tracheobronchomalacia. \par \par \par \par \par

## 2021-09-13 DIAGNOSIS — Z01.818 ENCOUNTER FOR OTHER PREPROCEDURAL EXAMINATION: ICD-10-CM

## 2021-09-14 ENCOUNTER — NON-APPOINTMENT (OUTPATIENT)
Age: 51
End: 2021-09-14

## 2021-09-15 ENCOUNTER — NON-APPOINTMENT (OUTPATIENT)
Age: 51
End: 2021-09-15

## 2021-09-15 ENCOUNTER — APPOINTMENT (OUTPATIENT)
Dept: DISASTER EMERGENCY | Facility: CLINIC | Age: 51
End: 2021-09-15

## 2021-09-16 ENCOUNTER — TRANSCRIPTION ENCOUNTER (OUTPATIENT)
Age: 51
End: 2021-09-16

## 2021-09-16 LAB — SARS-COV-2 N GENE NPH QL NAA+PROBE: NOT DETECTED

## 2021-09-17 ENCOUNTER — APPOINTMENT (OUTPATIENT)
Dept: THORACIC SURGERY | Facility: HOSPITAL | Age: 51
End: 2021-09-17

## 2021-09-17 ENCOUNTER — OUTPATIENT (OUTPATIENT)
Dept: OUTPATIENT SERVICES | Facility: HOSPITAL | Age: 51
LOS: 1 days | Discharge: HOME | End: 2021-09-17
Payer: COMMERCIAL

## 2021-09-17 DIAGNOSIS — Z98.890 OTHER SPECIFIED POSTPROCEDURAL STATES: Chronic | ICD-10-CM

## 2021-09-17 DIAGNOSIS — Z98.51 TUBAL LIGATION STATUS: Chronic | ICD-10-CM

## 2021-09-17 DIAGNOSIS — Z86.018 PERSONAL HISTORY OF OTHER BENIGN NEOPLASM: Chronic | ICD-10-CM

## 2021-09-17 DIAGNOSIS — Z90.49 ACQUIRED ABSENCE OF OTHER SPECIFIED PARTS OF DIGESTIVE TRACT: Chronic | ICD-10-CM

## 2021-09-17 DIAGNOSIS — Z98.49 CATARACT EXTRACTION STATUS, UNSPECIFIED EYE: Chronic | ICD-10-CM

## 2021-09-17 PROCEDURE — 31622 DX BRONCHOSCOPE/WASH: CPT

## 2021-10-12 RX ORDER — ATORVASTATIN CALCIUM 40 MG/1
40 TABLET, FILM COATED ORAL DAILY
Qty: 90 | Refills: 3 | Status: DISCONTINUED | COMMUNITY
Start: 2021-07-15 | End: 2021-10-12

## 2021-11-02 ENCOUNTER — APPOINTMENT (OUTPATIENT)
Dept: PULMONOLOGY | Facility: CLINIC | Age: 51
End: 2021-11-02

## 2021-11-07 NOTE — HISTORY OF PRESENT ILLNESS
[FreeTextEntry1] : This is a followup visit for Ms. Lawrence. \par \par H/o chest pain/non-cardiac, dysphagia, poor esophageal motility, s/p tracheo- bronchoplasty (2018) with baseline SOB, hypothyroid on rx, obese, anxiety. \par \par No personal h/o MI. FH of CAD, including father at age 52. No HTN. Mild hyperlipidemia, not on rx. Non-smoker. No DM.\par \par Echo 9/2021: Mild LVH, o/w normal\par Cardiac CT 9/15/2020:\par no coronary calcium\par LVEF 60%\par mild atherosclerosis; nothing flow-limiting\par \par Holter monitor 7/2021:\par short runs of SVT (longest 8 beats)\par \par Labs 5/2021:\par creat 0.83, LFT's normal\par chol 288, HDL 82, trig 130,  mg/dL\par Alc 5.6%\par \par At her last visit in July 2021, the following issues were discussed:\par Chest pain (786.50) (R07.9)\par  · H/o atypical chest symptoms. CTA coronaries showed no significant CAD with cor\par     calcium of 0. Suggest preventive strategies, including statin therapy. No further imaging\par     indicated.\par Hyperlipemia (272.4) (E78.5)\par  · Discussed need to alter diet/improve activity. Given recent lipid data (and very mild athero\par     noted on CTA), she warrants statin therapy. Risks:benefits noted. She follows up with\par     Endocrine ([facility]) for her thyroid.\par Palpitation (785.1) (R00.2)\par  · At times, notes "heart racing" sensation. No syncope or other related symptoms. Will\par     place long-term monitor to assess for arrhythmia. My sense is that she is having mild\par     sinus tachy at times, perhaps related to anxiety and/or poor physical shape/weight.\par \par Recent issues with tracheal obstruction/tracheobronchomalacia

## 2021-11-11 ENCOUNTER — APPOINTMENT (OUTPATIENT)
Dept: CARDIOLOGY | Facility: CLINIC | Age: 51
End: 2021-11-11

## 2021-11-15 NOTE — PATIENT PROFILE ADULT. - NS SC CAGE ALCOHOL EYE OPENER
How Many Skin Cancers Have You Had?: one What Is The Reason For Today's Visit?: History of Non-Melanoma Skin Cancer no

## 2021-12-09 ENCOUNTER — TRANSCRIPTION ENCOUNTER (OUTPATIENT)
Age: 51
End: 2021-12-09

## 2022-01-27 NOTE — H&P PST ADULT - PROBLEM/PLAN-1
She may stop xarelto but she has to wean from it. From taking it daily she can: Take it every other day for one week  THen take it Mon/Wed/Fri for one week  Then Mon/Fri for one week  Then Mon only and she will be weaned. DISPLAY PLAN FREE TEXT

## 2022-02-03 ENCOUNTER — APPOINTMENT (OUTPATIENT)
Dept: PULMONOLOGY | Facility: CLINIC | Age: 52
End: 2022-02-03
Payer: COMMERCIAL

## 2022-02-03 ENCOUNTER — NON-APPOINTMENT (OUTPATIENT)
Age: 52
End: 2022-02-03

## 2022-02-03 VITALS
RESPIRATION RATE: 16 BRPM | HEIGHT: 64 IN | SYSTOLIC BLOOD PRESSURE: 124 MMHG | DIASTOLIC BLOOD PRESSURE: 82 MMHG | OXYGEN SATURATION: 98 % | TEMPERATURE: 97.6 F | WEIGHT: 163 LBS | BODY MASS INDEX: 27.83 KG/M2 | HEART RATE: 72 BPM

## 2022-02-03 DIAGNOSIS — Z01.811 ENCOUNTER FOR PREPROCEDURAL RESPIRATORY EXAMINATION: ICD-10-CM

## 2022-02-03 PROCEDURE — 99214 OFFICE O/P EST MOD 30 MIN: CPT | Mod: 25

## 2022-02-03 PROCEDURE — 95012 NITRIC OXIDE EXP GAS DETER: CPT

## 2022-02-03 PROCEDURE — 94618 PULMONARY STRESS TESTING: CPT

## 2022-02-03 PROCEDURE — 94010 BREATHING CAPACITY TEST: CPT

## 2022-02-03 NOTE — ASSESSMENT
[FreeTextEntry1] : Ms. Lawrence is a 50 y/o female with a history of hypothyroidism, IBS, constipation, pneumococcal PNA, who now comes in for pulmonary re-evaluation s/p tracheoplasty - residual cough / awaiting liposuction 4/2022- preop \par \par *************************************************Pre-Op Liposuction******************************************************************\par -at this point in time there are no absolute pulmonary contraindications to go forward with the planned procedure \par -at the time of surgery s/he should have optimal pain control, incentive spirometry, early ambulation, DVT and GI prophylaxis. \par \par Her SOB is multifactorial:\par -overweight / out of shape\par -poor breathing mechanics\par -asthma\par -(+)tracheomalacia\par -cardiac disease (pericarditis, PAH)\par -sarcoidosis\par \par Problem 1A Preop for Liposuction 4/2022\par -at this point in time there are no absolute pulmonary contraindications to go forward with the planned procedure \par -at the time of surgery s/he should have optimal pain control, incentive spirometry, early ambulation, DVT and GI prophylaxis. \par \par problem 1: overweight\par -recommended "MUNIQ" OTC supplement \par Weight loss, exercise, and diet control were discussed and are highly encouraged. Treatment options were given such as, aqua therapy, and contacting a nutritionist. Recommended to use the elliptical, stationary bike, less use of treadmill. Mindful eating was explained to the patient Obesity is associated with worsening asthma, shortness of breath, and potential for cardiac disease, diabetes, and other underlying medical conditions. \par \par problem 2: poor breathing mechanics\par -recommended Wim Hof and Buteyko breathing techniques \par -Proper breathing techniques were reviewed with an emphasis of exhalation. Patient instructed to breath in for 1 second and out for four seconds. Patient was encouraged to not talk while walking.\par \par problem 3: tracheomalacia- (Test Results Positive)\par -Dynamic chest CT (rescripted)\par -She is s/p tracheoplasty\par Tracheomalacia is usually acquired in adults and common causes include damage by tracheostomy or endotracheal intubation damaging the tracheal cartilage with increase risk with multiple intubations, prolonged intubation, and concurrent high dose steroid therapy; external chest wall trauma and surgery; chronic compression of the trachea by benign etiologies (eg, benign mediastinal goiter) or malignancy; relapsing polychondritis; or recurrent infection. Tracheomalacia can be asymptomatic, however signs or symptoms can develop as the severity of the airway narrowing progresses with major symptoms include dyspnea, cough, and sputum retention. Other symptoms include severe paroxysms of coughing, wheezing or stridor, barking cough and may be exacerbated by forced expiration, cough, and valsalva maneuver. Tracheomalacia is diagnosed by a bronchoscopic visualization of dynamic airway collapse on dynamic chest CT. Therapy is warranted in symptomatic patients with severe tracheomalacia and includes surgical repair as tracheobronchoplasty. The patient was referred to Dr. Nathaniel Goodson or Dr. Mal Joyce, at [facility] for a surgical consult. \par \par Problem 4: Sensory Neuropathic Cough\par - off Neurontin 100 mg Q8H\par -Sensory neuropathic cough is an etiology of cough that is often realized once someone has been ruled out for common disease such as: asthma, COPD, eosinophilic bronchitis, bronchiectasis, post nasal drip, and GERD. It sometimes develops following a URI, herpes zoster outbreak in pharynx or thyroid or cervical spine injury. However, many patients have no identifiable antecedent explanation. \par \par problem 5: chronic cough\par -associated with PND, asthma, GERD, TBM, cardiac disease, sarcoidosis\par -obtain blood work asthma profile, IgE level (Elevated) eosinophil level, vitamin D level, ESR level, ACE level, pertussis titer, chlamydia pneumoniae\par \par Problem 6: Elevated IgE Level\par - She is a candidate for Xolair if symptoms progress\par -Xolair is a recombinant DNA- derived humanized IgG1K monoclonal antibody that selectively binds ot human immunoglobulin E (IgE). Xolair is produced by a Chinese hamster ovary cell suspension culture in nutrient medium containing the antibiotic gentamicin. Gentamicin is not detectable in the final product. Xolair is a sterile, white, preservative free, lyophilized powder contained in a single use vial that is reconstituted with sterile water for suspension. Side effects include: wheezing, tightness of the chest, trouble breathing, hives, skin rash, feeling anxious or light-headed, fainting, warmth or tingling under skin, or swelling of face, lips, or tongue \par \par problem 7: GERD - (?achalasia / ?esophageal dysmotility)\par -Continue GI follow up with Dr. Gonzalez\par -continue Protonix 40 mg QAM, pre-breakfast\par -continue Pepcid 40 mg QHS \par -continue to f/u with Dr. Gonzalez / Saritha\par -Rule of 2's- Avoid eating too much, too late, too poorly, too spicy, or two hours before bed \par -Things to avoid including overeating, spicy foods, tight clothing, eating within three hours of bed, this list is not all inclusive. \par -For treatment of reflux, possible options discussed including diet control, H2 blockers, PPIs, as well as coating motility agents discussed as treatment options. Timing of meals and proximity of last meal to sleep were discussed. If symptoms persist, a formal gastrointestinal evaluation is needed.\par \par Problem 8: Allergic Rhinitis\par -continue Clarinex 5 mg QHS\par Environmental measures for allergies were encouraged including mattress and pillow cover, air purifier, and environmental controls.\par \par problem 9: low vitamin D\par -start supplemental Vitamin D\par -Has been associated with asthma exacerbations and increased allergic symptoms. The goal based on recent information is maintaining levels between 50-70 and low normal is 30. Recommended 50,000 units every two weeks to once a month depending on the level.\par \par problem 10: cardiac disease\par -f/u with [facility] (Dr. Montesinos)\par \par problem 11: r/o asthma\par -MCT unable at this time\par - continue Singulair 10 mg QHS\par -continue Breztri 2 puffs BID \par -Inhaler technique reviewed as well as oral hygiene techniques reviewed with patient. Avoidance of cold air, extremes of temperature, rescue inhaler should be used before exercise. Order of medication reviewed with patient. Recommended use of a cool mist humidifier in the bedroom. \par Asthma is believed to be caused by inherited (genetic) and environmental factor, but its exact cause is unknown. Asthma may be triggered by allergens, lung infections, or irritants in the air. Asthma triggers are different for each person.\par \par problem 9: poor sleep (denies snoring)\par -set up home sleep study if worsening\par Good sleep hygiene was encouraged including avoiding watching television an hour before bed, keeping caffeine at a low, avoiding reading, television, or anything, in bed, no drinking any liquids three hours before bedtime, and only getting into bed when tired and ready for sleep. \par \par problem 10: health maintenance\par -s/p covid 19 vaccine x3\par -recommended a yearly flu shot after October 15- 2021\par -recommended strep pneumonia vaccines: Prevnar-13 vaccine, followed by Pneumo vaccine 23 on year following\par -recommended early intervention for URIs\par -recommended osteoporosis evaluations\par -recommended early dermatological evaluations\par -recommended after the age of 50 to the age of 70, colonoscopy every 5 years\par -encouraged early intervention\par \par Follow up in 3 months - SPI / NIOX\par The patient was encouraged to call with any changes, concerns, or questions.

## 2022-02-03 NOTE — HISTORY OF PRESENT ILLNESS
[FreeTextEntry1] : Ms. Lawrence is a 51 year old female with a history of allergic rhinitis, chronic cough, dysphagia, elevated IgE, GERD, low vitamin D, OW, SOB, and tracheomalacia s/p tracheoplasty presenting to the office today for a follow up visit. Her chief complaint is\par \par -she notes Dr Ivory prescribed metoprolol but she did not take \par -she notes intermittent tachycardia\par -she notes dysphagia onset trachea surgery \par -she notes mild ankle swelling\par -she notes recent weight loss but now starting dieting \par -she notes awaiting liposuction- Dr Ga 4/2022\par -s/p Covid 19 vaccine x3\par -she notes breathing and exercise limited onset trachea surgery \par -she notes mild cough after eating onset surgery \par -she notes dysphagia improved onset surgery \par \par -denies any visual issues, headaches, nausea, vomiting, fever, chills, sweats, chest pain, chest pressure, diarrhea, constipation, dysphagia, dizziness, leg swelling, leg pain, itchy eyes, itchy ears, heartburn, reflux, or sour taste in the mouth.

## 2022-02-03 NOTE — PROCEDURE
[FreeTextEntry1] : PFT revealed normal flows, with a FEV1 of 2.52 L,which is 92% of predicted with a normal flow volume loop\par \par 6 minute walk test reveals a low saturation of 97% with no evidence of dyspnea or fatigue; walked 586.8meters

## 2022-02-03 NOTE — ADDENDUM
[FreeTextEntry1] : Documented by Jackson Leonard acting as a scribe for Dr. Jack Summers on 02/03/2022 \par \par All medical record entries made by the Scribe were at my, Dr. Jack Summers's, direction and personally dictated by me on 02/03/2022 . I have reviewed the chart and agree that the record accurately reflects my personal performance of the history, physical exam, assessment and plan. I have also personally directed, reviewed, and agree with the discharge instructions

## 2022-03-08 ENCOUNTER — APPOINTMENT (OUTPATIENT)
Dept: ULTRASOUND IMAGING | Facility: CLINIC | Age: 52
End: 2022-03-08

## 2022-03-17 ENCOUNTER — APPOINTMENT (OUTPATIENT)
Dept: GASTROENTEROLOGY | Facility: CLINIC | Age: 52
End: 2022-03-17

## 2022-03-17 ENCOUNTER — APPOINTMENT (OUTPATIENT)
Dept: GASTROENTEROLOGY | Facility: CLINIC | Age: 52
End: 2022-03-17
Payer: COMMERCIAL

## 2022-03-17 VITALS
DIASTOLIC BLOOD PRESSURE: 80 MMHG | SYSTOLIC BLOOD PRESSURE: 130 MMHG | HEART RATE: 78 BPM | TEMPERATURE: 98.7 F | HEIGHT: 64 IN | OXYGEN SATURATION: 98 %

## 2022-03-17 DIAGNOSIS — K22.4 DYSKINESIA OF ESOPHAGUS: ICD-10-CM

## 2022-03-17 DIAGNOSIS — R13.10 DYSPHAGIA, UNSPECIFIED: ICD-10-CM

## 2022-03-17 PROCEDURE — 99214 OFFICE O/P EST MOD 30 MIN: CPT

## 2022-03-22 ENCOUNTER — APPOINTMENT (OUTPATIENT)
Dept: GASTROENTEROLOGY | Facility: HOSPITAL | Age: 52
End: 2022-03-22

## 2022-03-28 ENCOUNTER — NON-APPOINTMENT (OUTPATIENT)
Age: 52
End: 2022-03-28

## 2022-03-31 ENCOUNTER — APPOINTMENT (OUTPATIENT)
Dept: THORACIC SURGERY | Facility: CLINIC | Age: 52
End: 2022-03-31
Payer: COMMERCIAL

## 2022-03-31 VITALS
DIASTOLIC BLOOD PRESSURE: 86 MMHG | WEIGHT: 170 LBS | SYSTOLIC BLOOD PRESSURE: 140 MMHG | HEART RATE: 86 BPM | HEIGHT: 64 IN | BODY MASS INDEX: 29.02 KG/M2 | OXYGEN SATURATION: 98 % | RESPIRATION RATE: 18 BRPM

## 2022-03-31 PROCEDURE — 99215 OFFICE O/P EST HI 40 MIN: CPT

## 2022-04-04 NOTE — CONSULT LETTER
[Consult Letter:] : I had the pleasure of evaluating your patient, [unfilled]. [( Thank you for referring [unfilled] for consultation for _____ )] : Thank you for referring [unfilled] for consultation for [unfilled] [Please see my note below.] : Please see my note below. [Consult Closing:] : Thank you very much for allowing me to participate in the care of this patient.  If you have any questions, please do not hesitate to contact me. [Sincerely,] : Sincerely, [Dear  ___] : Dear  [unfilled], [FreeTextEntry2] : Jack Summers MD (Pulm) [FreeTextEntry3] : Hans Segundo MD, MPH \par System Director of Thoracic Surgery \par Director of Comprehensive Lung and Foregut [place] \par Professor Cardiovascular & Thoracic Surgery  \par [facility] [employer] [facility]\par \par [facility]\par [address]\par Oncology [address]\[place] [address]\par Tel: [phone]\par Fax: [phone]\par

## 2022-04-04 NOTE — ASSESSMENT
[FreeTextEntry1] : Ms. ANDREW AGUILAR, 51 year old female, never smoker, w/ hx of HLD, pertussis, GERD, recent diagnosis of SVT. She is status post Right VATS, robotic assisted, tracheobronchoplasty with Prolene mesh on 3/12/18 by Dr. Goodson. She was initially referred by Dr. Summers.\par \par CT CHEST on 8/18/2021:\par - No consolidations, edema, effusion or pneumothorax\par - On expiratory imaging there is near complete collapse of the posterior membrane of the trachea and bilateral mainstem bronchi, findings consistent with tracheobronchomalacia\par \par She is s/p Awake/Dynamic Bronch on 9/17/2021 by Dr. Nathaniel Goodson and Dr. Vega Stovall. Finding revealed excessive dynamic airway collapse of appx 50% at distal trachea and it did not appear to be obstructive or limiting. \par \par I have reviewed the patient's medical records and diagnostic images at time of this office consultation and have made the following recommendation:\par 1. CT and bronchoscopy reviewed, pt is stable and may RTC in 1 yr with Dynamic CT Chest\par 2. Cleared for intubation from thoracic surgery standpoint for upcoming spinal surgery \par \par \par I personally performed the services described in the documentation, reviewed the documentation recorded by the scribe in my presence and it accurately and completely records my words and actions. \par \par I, Mirella Hughes NP, am scribing for and the presence of NATHAN Rivas, the following sections HISTORY OF PRESENT ILLNESS, PAST MEDICAL/FAMILY/SOCIAL HISTORY; REVIEW OF SYSTEMS; VITAL SIGNS; PHYSICAL EXAM; DISPOSITION.\par \par .\par

## 2022-04-04 NOTE — DATA REVIEWED
[FreeTextEntry1] : I have independently reviewed the following:\par CT CHEST on 8/18/2021\par Awake/Dynamic Bronch on 9/17/2021\par

## 2022-04-04 NOTE — HISTORY OF PRESENT ILLNESS
Progress Notes by Юлия Lu PT at 10/31/17 01:10 PM     Author:  Юлия Lu PT Service:  (none) Author Type:  Physical Therapist     Filed:  10/31/17 02:59 PM Encounter Date:  10/31/2017 Status:  Signed     :  Юлия Lu PT (Physical Therapist)            DIAGNOSIS: LEFT> RIGHT  1. Primary osteoarthritis of both knees    2. Chronic pain of both knees    3. Muscle weakness      INSURANCE BENEFITS:  · Benefit maximums: 60 Visits per calendar year (0 visits used this year to date)combined/hard max  · Are there a limited number of units/modalities per day? Based on medical necessity  · Precert needed: YES: Submit to [facility] after Evaluation  · Carve Out: Yes  · Patient is approved for 8 visits for dates 10/3/17 - 11/10/17.    PHYSICIAN RECOMMENDATIONS: Evaluate and Treat.    ATTENDANCE: Patient has been seen for[KM1.1C] 3[KM1.1M]/8 visits between 10/3/2017 and[KM1.1C]  10/31/2017[KM1.1T].  Progress Summary due by 11/3/2017    SUBJECTIVE: Patient states[KM1.1C] the lateral aspects of the knees have been bothering her.[KM1.2M]    From initial evaluation:    Onset date: chronic- years; fluctuates  · Average pain: 8/10 at worst and 0/10 at best; bilateral knee pain--lateral to the patella, occasionally sharp but other times ache; intermittent   · Associated factors: left knee popping with pain associated with it, left knee negro at time, some tingling in posterior left leg    Mechanism of injury/surgery: insidious onset    Pertinent medical history: see medical history in chart   o Co-morbidities significant to therapy include left knee scope---2000, osteopenia  · Social History: lives with ; stairs    Pertinent Meds: Tylenol or Advil    Aggravating factors: working, prolonged standing (> 8 hours), prolonged walking, sit to stands, getting onto the floor, kneeling    Easing factors: resting    Job duties: works in [employer]- works in [employer] department; can be standing 10 hours    OBJECTIVE:    Observation/Posture: pes planus, stands with her knees hyperextended    Range of Motion:   Knee range of motion (*=pain):   Right  10/3/2017  Left  10/3/2017    Active Flexion 5 to 0 to 126* (posterior knee pain) 5* to 0 to 120* (lateral and posterior knee pain for both directions)     Manual Muscle Test:   Strength per manual muscle test (*=pain):   Right  10/3/2017  Left  10/3/2017    Hip Flexion 4+/5* (lateral right knee pain) 5-/5* (lateral upper leg pain)   Hip Extension with knee straight 4+/5 4+/5*   Hip Extension with knee bent 4/5 4/5*   Hip Abduction 4/5* 4/5*   Hip External Rotation 4+/5 4+/5*   Hip Internal Rotation 4+/5* 4+/5*     Strength per manual muscle test (*=pain):   Right  10/3/2017  Left  10/3/2017    Quadriceps 5-/5 5-/5   Hamstrings 4+/5 4+/5* (anterior knee pain)     Palpation:  · tenderness to the lateral and inferior aspect of the left patella and the distal lateral hamstring musculature/insertions and tenderness to the right lateral and superomedial aspect of the patella    Flexibility:  · Hamstring: minimal/moderate tightness present bilaterally  · Quadriceps: moderate tightness present bilaterally    Joint Mobility:  · Right patella: pain with medial, lateral and superior glides; normal mobility  · Left patella: pain with medial, lateral and inferior glides; normal mobility    Functional Assessment:   · Gait: genu valgum, crosses midline with swing leg  · Step ups and overs on 6 inch step: poor eccentric control, some pain present, toeing out prior to stepping down  · Functional sit to stands from chair: genu valgum, poor eccentric control, hyperextends knees upon standing  · Tandem stance: 30 seconds bilaterally with 1 upper extremity tap, some lateral left knee pain when left leg was in front    TREATMENT TODAY:   Time in:[KM1.1C] 1:[KM1.1M]08[KM1.2M] PM     Time out:[KM1.1C] 1:56[KM1.2M] PM    Manual Therapy to decrease myofascial tightness:  97140 x 2 units -  23 minutes  · soft  tissue mobilization to bilateral knees    Therapeutic Exercise to increase range of motion, improve flexibility, improve balance, increase strength and instruct in a home exercise program:  97110 x 2 units -  2[KM1.1C]3[KM1.2M] minutes  Reviewed home program:  *All exercises to be performed bilaterally unless otherwise noted*  · Standing hip abduction x 15 (cues to avoid knee hyperextension)  · Standing hip extension x 15 (cues to avoid knee hyperextension)  · Step ups and overs on (2) 4 inch step x 5 laps each  · Total gym squats at level 18 x 20  · Tandem stance on air-ex; 2 x 30 seconds   · Side stepping in parallel bars with peach theraband x 4 laps[KM1.1C]    New:  · SCI-FIT bike x 4 minutes  · Terminal knee extension with blue ball at wall with 3 second holds x 10 each  · Monster walks with peach theraband in parallel bars x 2 laps  · Sidelying hip abduction x 10 each[KM1.2M]    Precautions: None    Home exercise program (last updated (10/3/2017): Patient issued written home exercise program.  Patient demonstrated exercises correctly and was instructed to call with questions. Patient instructed to terminate home exercises if they become painful.   2x/day:  · Standing hip abduction x 10  · Standing hip extension x 15  · Seated hamstring curls with peach theraband x 15  · Clam shells x 15  · Bent knee fallouts x 15    ASSESSMENT/TREATMENT RESPONSE:  Patient's signs and symptoms are consistent with bilateral knee osteoarthritis.  Findings of the evaluation include poor standing posture (knee hyperextension bilaterally), hip/knee weakness, decreased lower extremity flexibility and slightly limited knee flexion range of motion. Poor form with sit to stands and step negotiation. See co-morbidities above that may impact therapy.  See subjective for patient’s self reported limitations with functional activity. Treatment will address the above, function and pain.     A clinical presentation with: changing  charactaristics    Patient's response to treatment:[KM1.1C] No reports of pain with exercises performed in the clinic. Did well with the addition of the stationary bike.[KM1.2M]    Functional improvement noted:[KM1.1C] None noted today.[KM1.2M]    Prognosis for meeting goals:  good.   Treatment will consist of modalities as needed, home program, manual therapy, neuromuscular re-education, Physical therapy evaluation, therapeutic activities and therapeutic exercise.  Treatment plan was discussed with the patient and verbal consent was obtained.    Remaining Impairment Requiring Continued Treatment: decreased range of motion, decreased flexibility, decreased strength, decreased balance, pain and impairment of functional performance    Short Term Goals (to be achieved in 2 weeks)  1. Patient will become more aware of her standing posture and require minimal to no cues to avoid knee hyperextension when standing in the clinic.[KM1.1C] ([KM1.2M]10/31/2017[KM1.3T]: Patient states she is trying to be more aware of her standing posture)  2. Patie[KM1.2M]nt will be able to perform tandem balance on air-ex for 30 seconds without loss of balance or increased pain.  3. Patient will be able to perform sit to stands from chair + 4 inch step with good form and no pain.  4. Patient will be independent with home exercise program and progression. (10/17/2017: Reviewed home program)    Long Term Goals (to be achieved in 4 weeks)  1. Patient will be able to perform sit to stands from chair with good form and no pain.  2. Patient will be able to get on and off the floor without difficulty or pain.  3. Patient will be able to perform all job related duties without difficulty or pain.  4. Patient will be able to perform all activities of daily living without difficulty or pain.  5. Patient will be able to perform single leg stance for 20 seconds without loss of balance or pain present.    PLAN:   Patient will be seen 2 times per week for 4  weeks or 8 visits.[KM1.1C]     Electronically Signed by:    Юлия Lu PT, DPT , 10/31/2017[KM1.2T]           Revision History        User Key Date/Time User Provider Type Action    > KM1.3 10/31/17 02:59 PM Юлия Lu, PT Physical Therapist Sign     KM1.2 10/31/17 02:55 PM Юлия Lu, PT Physical Therapist      KM1.1 10/31/17 01:10 PM Юлия Lu, PT Physical Therapist     C - Copied, M - Manual, T - Template             [FreeTextEntry1] : Ms. ANDREW AGUILAR, 51 year old female, never smoker, w/ hx of HLD, pertussis, GERD, recent diagnosis of SVT. She is status post Right VATS, robotic assisted, tracheobronchoplasty with Prolene mesh on 3/12/18 by Dr. Goodson. She was initially referred by Dr. Summers.\par \par CT CHEST on 8/18/2021:\par - No consolidations, edema, effusion or pneumothorax\par - On expiratory imaging there is near complete collapse of the posterior membrane of the trachea and bilateral mainstem bronchi, findings consistent with tracheobronchomalacia\par \par She is s/p Awake/Dynamic Bronch on 9/17/2021 by Dr. Nathaniel Goodson and Dr. Vega Stovall. Finding revealed excessive dynamic airway collapse of appx 50% at distal trachea and it did not appear to be obstructive or limiting. \par \par Patient is here today to establish care and follow up. Pt has upcoming surgery for her cervical spine and will need to be cleared by CTS.

## 2022-04-07 ENCOUNTER — APPOINTMENT (OUTPATIENT)
Dept: GASTROENTEROLOGY | Facility: CLINIC | Age: 52
End: 2022-04-07

## 2022-05-03 ENCOUNTER — OUTPATIENT (OUTPATIENT)
Dept: OUTPATIENT SERVICES | Facility: HOSPITAL | Age: 52
LOS: 1 days | Discharge: HOME | End: 2022-05-03
Payer: COMMERCIAL

## 2022-05-03 ENCOUNTER — APPOINTMENT (OUTPATIENT)
Dept: GASTROENTEROLOGY | Facility: HOSPITAL | Age: 52
End: 2022-05-03

## 2022-05-03 ENCOUNTER — NON-APPOINTMENT (OUTPATIENT)
Age: 52
End: 2022-05-03

## 2022-05-03 VITALS
SYSTOLIC BLOOD PRESSURE: 121 MMHG | OXYGEN SATURATION: 100 % | WEIGHT: 175.05 LBS | RESPIRATION RATE: 17 BRPM | HEIGHT: 64 IN | DIASTOLIC BLOOD PRESSURE: 71 MMHG | TEMPERATURE: 98 F | HEART RATE: 74 BPM

## 2022-05-03 VITALS
OXYGEN SATURATION: 100 % | RESPIRATION RATE: 15 BRPM | HEART RATE: 69 BPM | SYSTOLIC BLOOD PRESSURE: 101 MMHG | DIASTOLIC BLOOD PRESSURE: 66 MMHG

## 2022-05-03 DIAGNOSIS — Z98.890 OTHER SPECIFIED POSTPROCEDURAL STATES: Chronic | ICD-10-CM

## 2022-05-03 DIAGNOSIS — Z86.018 PERSONAL HISTORY OF OTHER BENIGN NEOPLASM: Chronic | ICD-10-CM

## 2022-05-03 DIAGNOSIS — Z98.49 CATARACT EXTRACTION STATUS, UNSPECIFIED EYE: Chronic | ICD-10-CM

## 2022-05-03 DIAGNOSIS — R13.10 DYSPHAGIA, UNSPECIFIED: ICD-10-CM

## 2022-05-03 DIAGNOSIS — Z98.51 TUBAL LIGATION STATUS: Chronic | ICD-10-CM

## 2022-05-03 DIAGNOSIS — Z90.49 ACQUIRED ABSENCE OF OTHER SPECIFIED PARTS OF DIGESTIVE TRACT: Chronic | ICD-10-CM

## 2022-05-03 PROCEDURE — 43235 EGD DIAGNOSTIC BRUSH WASH: CPT

## 2022-05-03 PROCEDURE — 91040 ESOPH BALLOON DISTENSION TST: CPT | Mod: 26

## 2022-05-03 DEVICE — CATH ENDOFLIP MEASURE 16MM: Type: IMPLANTABLE DEVICE | Status: FUNCTIONAL

## 2022-05-03 NOTE — ASU PATIENT PROFILE, ADULT - FALL HARM RISK - UNIVERSAL INTERVENTIONS
Bed in lowest position, wheels locked, appropriate side rails in place/Call bell, personal items and telephone in reach/Instruct patient to call for assistance before getting out of bed or chair/Non-slip footwear when patient is out of bed/[place] to call system/Physically safe environment - no spills, clutter or unnecessary equipment/Purposeful Proactive Rounding/Room/bathroom lighting operational, light cord in reach

## 2022-05-03 NOTE — ASU PREOP CHECKLIST - AICD PRESENT
no
I have read and agree with the resident's note.  I examined the patient this morning and agree with above physical exam, with edits made where appropriate.   I was physically present for the evaluation and management services provided.  I agree with the above history and plan which I reviewed and edited where appropriate.  I spent > 30 minutes with the patient and the patient's family on direct patient care, review of labs, discussing of results with patients family and discharge planning.     Cristobal Alba MD

## 2022-05-17 ENCOUNTER — APPOINTMENT (OUTPATIENT)
Dept: PULMONOLOGY | Facility: CLINIC | Age: 52
End: 2022-05-17

## 2022-05-19 ENCOUNTER — APPOINTMENT (OUTPATIENT)
Dept: ORTHOPEDIC SURGERY | Facility: CLINIC | Age: 52
End: 2022-05-19
Payer: COMMERCIAL

## 2022-05-19 ENCOUNTER — OUTPATIENT (OUTPATIENT)
Dept: OUTPATIENT SERVICES | Facility: HOSPITAL | Age: 52
LOS: 1 days | Discharge: HOME | End: 2022-05-19
Payer: COMMERCIAL

## 2022-05-19 VITALS
WEIGHT: 175.71 LBS | DIASTOLIC BLOOD PRESSURE: 92 MMHG | OXYGEN SATURATION: 98 % | TEMPERATURE: 98 F | HEIGHT: 64 IN | RESPIRATION RATE: 18 BRPM | SYSTOLIC BLOOD PRESSURE: 137 MMHG | HEART RATE: 88 BPM

## 2022-05-19 VITALS — HEIGHT: 64 IN | WEIGHT: 170 LBS | BODY MASS INDEX: 29.02 KG/M2

## 2022-05-19 DIAGNOSIS — Z98.890 OTHER SPECIFIED POSTPROCEDURAL STATES: Chronic | ICD-10-CM

## 2022-05-19 DIAGNOSIS — Z98.49 CATARACT EXTRACTION STATUS, UNSPECIFIED EYE: Chronic | ICD-10-CM

## 2022-05-19 DIAGNOSIS — Z98.51 TUBAL LIGATION STATUS: Chronic | ICD-10-CM

## 2022-05-19 DIAGNOSIS — M50.20 OTHER CERVICAL DISC DISPLACEMENT, UNSPECIFIED CERVICAL REGION: ICD-10-CM

## 2022-05-19 DIAGNOSIS — M75.42 IMPINGEMENT SYNDROME OF LEFT SHOULDER: ICD-10-CM

## 2022-05-19 DIAGNOSIS — Z01.818 ENCOUNTER FOR OTHER PREPROCEDURAL EXAMINATION: ICD-10-CM

## 2022-05-19 DIAGNOSIS — Z90.49 ACQUIRED ABSENCE OF OTHER SPECIFIED PARTS OF DIGESTIVE TRACT: Chronic | ICD-10-CM

## 2022-05-19 DIAGNOSIS — E78.5 HYPERLIPIDEMIA, UNSPECIFIED: ICD-10-CM

## 2022-05-19 DIAGNOSIS — Z86.018 PERSONAL HISTORY OF OTHER BENIGN NEOPLASM: Chronic | ICD-10-CM

## 2022-05-19 DIAGNOSIS — S16.1XXD STRAIN OF MUSCLE, FASCIA AND TENDON AT NECK LEVEL, SUBSEQUENT ENCOUNTER: ICD-10-CM

## 2022-05-19 DIAGNOSIS — E03.9 HYPOTHYROIDISM, UNSPECIFIED: ICD-10-CM

## 2022-05-19 DIAGNOSIS — Z01.812 ENCOUNTER FOR PREPROCEDURAL LABORATORY EXAMINATION: ICD-10-CM

## 2022-05-19 LAB
A1C WITH ESTIMATED AVERAGE GLUCOSE RESULT: 5.2 % — SIGNIFICANT CHANGE UP (ref 4–5.6)
ALBUMIN SERPL ELPH-MCNC: 4.3 G/DL — SIGNIFICANT CHANGE UP (ref 3.3–5)
ALP SERPL-CCNC: 66 U/L — SIGNIFICANT CHANGE UP (ref 40–120)
ALT FLD-CCNC: 41 U/L — SIGNIFICANT CHANGE UP (ref 12–78)
ANION GAP SERPL CALC-SCNC: 10 MMOL/L — SIGNIFICANT CHANGE UP (ref 5–17)
APTT BLD: 32.7 SEC — SIGNIFICANT CHANGE UP (ref 27.5–35.5)
AST SERPL-CCNC: 22 U/L — SIGNIFICANT CHANGE UP (ref 15–37)
BASOPHILS # BLD AUTO: 0.06 K/UL — SIGNIFICANT CHANGE UP (ref 0–0.2)
BASOPHILS NFR BLD AUTO: 1.2 % — SIGNIFICANT CHANGE UP (ref 0–2)
BILIRUB SERPL-MCNC: 0.4 MG/DL — SIGNIFICANT CHANGE UP (ref 0.2–1.2)
BLD GP AB SCN SERPL QL: SIGNIFICANT CHANGE UP
BUN SERPL-MCNC: 16 MG/DL — SIGNIFICANT CHANGE UP (ref 7–23)
CALCIUM SERPL-MCNC: 9.7 MG/DL — SIGNIFICANT CHANGE UP (ref 8.5–10.1)
CHLORIDE SERPL-SCNC: 106 MMOL/L — SIGNIFICANT CHANGE UP (ref 96–108)
CO2 SERPL-SCNC: 26 MMOL/L — SIGNIFICANT CHANGE UP (ref 22–31)
CREAT SERPL-MCNC: 0.7 MG/DL — SIGNIFICANT CHANGE UP (ref 0.5–1.3)
EGFR: 103 ML/MIN/1.73M2 — SIGNIFICANT CHANGE UP
EOSINOPHIL # BLD AUTO: 0.17 K/UL — SIGNIFICANT CHANGE UP (ref 0–0.5)
EOSINOPHIL NFR BLD AUTO: 3.5 % — SIGNIFICANT CHANGE UP (ref 0–6)
ESTIMATED AVERAGE GLUCOSE: 103 MG/DL — SIGNIFICANT CHANGE UP (ref 68–114)
GLUCOSE SERPL-MCNC: 81 MG/DL — SIGNIFICANT CHANGE UP (ref 70–99)
HCG SERPL-ACNC: 1 MIU/ML — SIGNIFICANT CHANGE UP
HCT VFR BLD CALC: 39 % — SIGNIFICANT CHANGE UP (ref 34.5–45)
HGB BLD-MCNC: 12.8 G/DL — SIGNIFICANT CHANGE UP (ref 11.5–15.5)
IMM GRANULOCYTES NFR BLD AUTO: 0.2 % — SIGNIFICANT CHANGE UP (ref 0–1.5)
INR BLD: 0.97 RATIO — SIGNIFICANT CHANGE UP (ref 0.88–1.16)
LYMPHOCYTES # BLD AUTO: 1.86 K/UL — SIGNIFICANT CHANGE UP (ref 1–3.3)
LYMPHOCYTES # BLD AUTO: 38.4 % — SIGNIFICANT CHANGE UP (ref 13–44)
MCHC RBC-ENTMCNC: 29.4 PG — SIGNIFICANT CHANGE UP (ref 27–34)
MCHC RBC-ENTMCNC: 32.8 G/DL — SIGNIFICANT CHANGE UP (ref 32–36)
MCV RBC AUTO: 89.4 FL — SIGNIFICANT CHANGE UP (ref 80–100)
MONOCYTES # BLD AUTO: 0.4 K/UL — SIGNIFICANT CHANGE UP (ref 0–0.9)
MONOCYTES NFR BLD AUTO: 8.3 % — SIGNIFICANT CHANGE UP (ref 2–14)
MRSA PCR RESULT.: SIGNIFICANT CHANGE UP
NEUTROPHILS # BLD AUTO: 2.34 K/UL — SIGNIFICANT CHANGE UP (ref 1.8–7.4)
NEUTROPHILS NFR BLD AUTO: 48.4 % — SIGNIFICANT CHANGE UP (ref 43–77)
NRBC # BLD: 0 /100 WBCS — SIGNIFICANT CHANGE UP (ref 0–0)
PLATELET # BLD AUTO: 328 K/UL — SIGNIFICANT CHANGE UP (ref 150–400)
POTASSIUM SERPL-MCNC: 4.6 MMOL/L — SIGNIFICANT CHANGE UP (ref 3.5–5.3)
POTASSIUM SERPL-SCNC: 4.6 MMOL/L — SIGNIFICANT CHANGE UP (ref 3.5–5.3)
PROT SERPL-MCNC: 7.7 GM/DL — SIGNIFICANT CHANGE UP (ref 6–8.3)
PROTHROM AB SERPL-ACNC: 11.6 SEC — SIGNIFICANT CHANGE UP (ref 10.5–13.4)
RBC # BLD: 4.36 M/UL — SIGNIFICANT CHANGE UP (ref 3.8–5.2)
RBC # FLD: 12.6 % — SIGNIFICANT CHANGE UP (ref 10.3–14.5)
S AUREUS DNA NOSE QL NAA+PROBE: SIGNIFICANT CHANGE UP
SODIUM SERPL-SCNC: 142 MMOL/L — SIGNIFICANT CHANGE UP (ref 135–145)
WBC # BLD: 4.84 K/UL — SIGNIFICANT CHANGE UP (ref 3.8–10.5)
WBC # FLD AUTO: 4.84 K/UL — SIGNIFICANT CHANGE UP (ref 3.8–10.5)

## 2022-05-19 PROCEDURE — 93010 ELECTROCARDIOGRAM REPORT: CPT

## 2022-05-19 PROCEDURE — 99213 OFFICE O/P EST LOW 20 MIN: CPT

## 2022-05-19 NOTE — ASSESSMENT
[FreeTextEntry1] : Left AC sprain, adhesive capsulitis. \par Now s/p L SA, SAD, acr, syn, GHD, DCE, capsular release.\par  She is having cervical fusion with Dr. Douglas.\par We will re-evalaute in 3 months.  \par Consider new MRI if shoulder pain continues.,

## 2022-05-19 NOTE — H&P PST ADULT - HISTORY OF PRESENT ILLNESS
52 year old female with a past medical history of tracheomalacia, GERD, hypothyroidism anxiety, and HLD c/o pain neck pain that radiates down to shoulder and arm with numbness and tingling.  She is scheduled for an anterior cervical discectomy fusion C3-C6  with image on 6/2/2022.    She denies fever, cough, SOB, recent travels, and sick contacts.

## 2022-05-19 NOTE — H&P PST ADULT - ATTENDING COMMENTS
indicated for acdf for cervical stenosis - r/b/e of the procedure discussed and questions answered   well informed and would like to proceed

## 2022-05-19 NOTE — PHYSICAL EXAM
[Left] : left shoulder [5 ___] : forward flexion 5[unfilled]/5 [5___] : external rotation 5[unfilled]/5 [] : positive Shanda [FreeTextEntry9] : FE: R 160, L 120\par ER: R 50, L 40

## 2022-05-19 NOTE — H&P PST ADULT - NSICDXPASTSURGICALHX_GEN_ALL_CORE_FT
PAST SURGICAL HISTORY:  H/O arthroscopy of right knee     H/O bilateral breast reduction surgery     H/O parotidectomy     H/O shoulder surgery left    H/O tubal ligation bilateral    History of abdominoplasty 2012, mini    History of bladder surgery sling    History of carpal tunnel release left    History of cataract surgery bilateral    History of lipoma removed from left shoulder    History of parotidectomy left    S/P tracheoplasty 2018 can use size 7 ET tube "I have been told I am totally fine now"    Status post arthroscopy of hip left    Status post right breast lumpectomy 1996

## 2022-05-19 NOTE — H&P PST ADULT - PROBLEM SELECTOR PLAN 2
labs - cbc,pt/ptt,bmp,t&s,nose cx,ekg  M/C and thoracic Stugeron clearance  required  preop 3 day Hibiclens instruction reviewed and given .instructed on if  nose cx positive use Mupirocin 5 days and checklist given  take routine meds DOS with sips of water. avoid NSAID and OTC supplements. verbalized understanding  information on proper nutrition , increase protein and better food choices provided in packet   anesthesiologist to review PST labs, EKG,  clearance and optimization for surgery

## 2022-05-19 NOTE — H&P PST ADULT - ASSESSMENT
52 year old female with a past medical history of tracheomalacia, GERD, hypothyroidism anxiety, and HLD c/o pain neck pain that radiates down to shoulder and arm with numbness and tingling.  She is scheduled for an anterior cervical discectomy fusion C3-C6  with image on 2022.    CAPRINI SCORE [CLOT]    AGE RELATED RISK FACTORS                                                       MOBILITY RELATED FACTORS  [x ] Age 41-60 years                                            (1 Point)                  [ ] Bed rest                                                        (1 Point)  [ ] Age: 61-74 years                                           (2 Points)                 [ ] Plaster cast                                                   (2 Points)  [ ] Age= 75 years                                              (3 Points)                 [ ] Bed bound for more than 72 hours                 (2 Points)    DISEASE RELATED RISK FACTORS                                               GENDER SPECIFIC FACTORS  [ ] Edema in the lower extremities                       (1 Point)                  [ ] Pregnancy                                                     (1 Point)  [ ] Varicose veins                                               (1 Point)                  [ ] Post-partum < 6 weeks                                   (1 Point)             [ x] BMI > 25 Kg/m2                                            (1 Point)                  [ ] Hormonal therapy  or oral contraception          (1 Point)                 [ ] Sepsis (in the previous month)                        (1 Point)                  [ ] History of pregnancy complications                 (1 point)  [ ] Pneumonia or serious lung disease                                               [ ] Unexplained or recurrent                     (1 Point)           (in the previous month)                               (1 Point)  [ ] Abnormal pulmonary function test                     (1 Point)                 SURGERY RELATED RISK FACTORS  [ ] Acute myocardial infarction                              (1 Point)                 [ ]  Section                                             (1 Point)  [ ] Congestive heart failure (in the previous month)  (1 Point)               [ ] Minor surgery                                                  (1 Point)   [ ] Inflammatory bowel disease                             (1 Point)                 [ ] Arthroscopic surgery                                        (2 Points)  [ ] Central venous access                                      (2 Points)                [ ]x General surgery lasting more than 45 minutes   (2 Points)       [ ] Stroke (in the previous month)                          (5 Points)               [ ] Elective arthroplasty                                         (5 Points)                                                                                                                                               HEMATOLOGY RELATED FACTORS                                                 TRAUMA RELATED RISK FACTORS  [ ] Prior episodes of VTE                                     (3 Points)                [ ] Fracture of the hip, pelvis, or leg                       (5 Points)  [ ] Positive family history for VTE                         (3 Points)                 [ ] Acute spinal cord injury (in the previous month)  (5 Points)  [ ] Prothrombin 53143 A                                     (3 Points)                 [ ] Paralysis  (less than 1 month)                             (5 Points)  [ ] Factor V Leiden                                             (3 Points)                  [ ] Multiple Trauma within 1 month                        (5 Points)  [ ] Lupus anticoagulants                                     (3 Points)                                                           [ ] Anticardiolipin antibodies                               (3 Points)                                                       [ ] High homocysteine in the blood                      (3 Points)                                             [ ] Other congenital or acquired thrombophilia      (3 Points)                                                [ ] Heparin induced thrombocytopenia                  (3 Points)                                          Total Score [      4    ]    Caprini Score 0 - 2:  Low Risk, No VTE Prophylaxis required for most patients, encourage ambulation  Caprini Score 3 - 6:  At Risk, pharmacologic VTE prophylaxis is indicated for most patients (in the absence of a contraindication)  Caprini Score Greater than or = 7:  High Risk, pharmacologic VTE prophylaxis is indicated for most patients (in the absence of a contraindication)

## 2022-05-25 ENCOUNTER — APPOINTMENT (OUTPATIENT)
Dept: ORTHOPEDIC SURGERY | Facility: CLINIC | Age: 52
End: 2022-05-25
Payer: COMMERCIAL

## 2022-05-25 DIAGNOSIS — M50.20 OTHER CERVICAL DISC DISPLACEMENT, UNSPECIFIED CERVICAL REGION: ICD-10-CM

## 2022-05-25 PROCEDURE — 99215 OFFICE O/P EST HI 40 MIN: CPT

## 2022-05-26 NOTE — HISTORY OF PRESENT ILLNESS
[Neck] : neck [Lower back] : lower back [Gradual] : gradual [Sudden] : sudden [7] : 7 [6] : 6 [Burning] : burning [Shooting] : shooting [Stabbing] : stabbing [Intermittent] : intermittent [Walking] : walking [Exercising] : exercising [Full time] : Work status: full time [de-identified] : 7/28/21: Here for f/u. Plan at last "Will eventually seek surgery for left shoulder. As far as neck has somewhat mild degenerative changes - no specific treatment indicated for this - suspect the shoulder the main issue based on current presentation of symptoms." She states she was initially doing well after surgery, but has since gotten increased pain after the first couple of sessions of PT. She states she has slowly improved, but the neck has been more bothersome lately. \par \par Had surgery with Dr Felder and was referred here for her neck \par \par had an episode with steroids which weren't helped\par \par 8/11/21: Here for MRI review. Plan at last "recently had left shoulder surgery and has pain in the left shoulder - referred for updated neck eval - indicated for updated MRI of the C spine for eval" - pain in the neck and to the left shoulder \par \par MRI C-spine 8/4/21:\par 1. C3-4: Left foraminal disc herniation causing moderate left foraminal stenosis. Dorsal annular fissure. No change.\par 2. C4-5: Broad-based central disc herniation impinging the ventral spinal cord. Dorsal annular fissure. Disc space narrowing. No change.\par 3. C6-7: Disc bulge abuts the ventral spinal cord. The bulge narrows the neuroforamina. Disc space narrowing and chronic vertebral endplate changes. No change.\par 4. C7-T1: Central disc herniation indents the thecal sac. No change.\par \par 3/12/22: plan last visit - "rec PT - reviewed the updated MRi - no change - lidocaine patch - no good surgical target" - here back for the neck - has had injections with Dr Sanches and has seen neurologic - no relief - neck pain and into the left shoulder and into the left arm remains - it is not responding to any treatment - pain limits her activity \par \par saw neurology and had further tests done and was told everything was coming from the neck\par \par 5/25/22: Here for follow-up; plan last visit was, " reviewed the case with her - persistent pain in the neck and the left side of the shoulder and into the left arm - reviewed tHE mri AND THERE IS neurologic narrowing in a correlating fashion - has tried extensive conservative tx and seen multiple providers who have been unable to provide relief and also have indicated that they feel her neck is the source of her pain - in light of all this she is indicated for. acdf C3-6. discussion of the surgery - the r/b/e discussed at length. 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

## 2022-05-26 NOTE — DISCUSSION/SUMMARY
[de-identified] : discussion of surgery - shes set up for surgery next week \par \par will plan of proceeding with surgery next week as planned\par \par I have indicated the patient for an Anterior Cervical Discectomy & Fusion.\par \par \par We've discussed the surgery details including instrumentation and grafting options (local, allograft, ICBG, and biologics) as well as potential for complications including but not limited to pain, scar, loss of function, permenant injury, death, need for additional surgery, need for blood transfuion, prolonged hospitilization, prolonged recovery, lack of recovery, blood clots, pulmonary embolism, heart attack, stroke, or  infection. There is also a possibility for hardware complication such as malposition of hardware,hardware loosening, pullout, failure or fracture of bone, adjacent segmen tdisease, pseudarthrosis, and need for future surgery. Finally, we discussed potential for injury to nerves, spinal cord or blood vessels, paralysis, blindness, need for transfusion, general anesthesia, allergic reaction, prolonged intubation,myocardial infarction, stroke, deep venous thrombosis, pulmonary embolus, and death. The patient understands these things and all questions are answered tohis/her satisfaction.  The surgery may need to be paused or staged or not completed due to intraoperative events or at the surgeon's discretion.  Any injury that occurs may be permenate.  \par \par Spinal fluid leak may occur and may require prolonged time in the hospital or further surgical procedures \par \par Patient has been instructed to stop all Aspirin and NSAIDs 10 days prior to surgery date. For Coumadin and other blood thinners, the patient is referred to the medical doctor\par \par We discussed we will use neuromonitioring for the surgery in order to possibly migitate risks of injury. \par \par The procedure does not come with a guarantee of success or of satisfaction on the patient's behalf.\par \par At the surgeon's discretion he may call for assistance during the surgery or in the perioperative period \par \par

## 2022-06-01 ENCOUNTER — APPOINTMENT (OUTPATIENT)
Dept: PULMONOLOGY | Facility: CLINIC | Age: 52
End: 2022-06-01

## 2022-06-02 ENCOUNTER — TRANSCRIPTION ENCOUNTER (OUTPATIENT)
Age: 52
End: 2022-06-02

## 2022-06-02 ENCOUNTER — INPATIENT (INPATIENT)
Facility: HOSPITAL | Age: 52
LOS: 0 days | Discharge: HOME | End: 2022-06-03
Attending: ORTHOPAEDIC SURGERY | Admitting: ORTHOPAEDIC SURGERY

## 2022-06-02 ENCOUNTER — APPOINTMENT (OUTPATIENT)
Dept: ORTHOPEDIC SURGERY | Facility: HOSPITAL | Age: 52
End: 2022-06-02

## 2022-06-02 VITALS
OXYGEN SATURATION: 100 % | HEART RATE: 72 BPM | HEIGHT: 64 IN | RESPIRATION RATE: 17 BRPM | WEIGHT: 169.98 LBS | SYSTOLIC BLOOD PRESSURE: 125 MMHG | TEMPERATURE: 98 F | DIASTOLIC BLOOD PRESSURE: 75 MMHG

## 2022-06-02 DIAGNOSIS — Z98.890 OTHER SPECIFIED POSTPROCEDURAL STATES: Chronic | ICD-10-CM

## 2022-06-02 DIAGNOSIS — Z86.018 PERSONAL HISTORY OF OTHER BENIGN NEOPLASM: Chronic | ICD-10-CM

## 2022-06-02 DIAGNOSIS — Z98.51 TUBAL LIGATION STATUS: Chronic | ICD-10-CM

## 2022-06-02 DIAGNOSIS — Z90.49 ACQUIRED ABSENCE OF OTHER SPECIFIED PARTS OF DIGESTIVE TRACT: Chronic | ICD-10-CM

## 2022-06-02 DIAGNOSIS — Z98.49 CATARACT EXTRACTION STATUS, UNSPECIFIED EYE: Chronic | ICD-10-CM

## 2022-06-02 LAB
ANION GAP SERPL CALC-SCNC: 6 MMOL/L — SIGNIFICANT CHANGE UP (ref 5–17)
BASOPHILS # BLD AUTO: 0.04 K/UL — SIGNIFICANT CHANGE UP (ref 0–0.2)
BASOPHILS NFR BLD AUTO: 0.4 % — SIGNIFICANT CHANGE UP (ref 0–2)
BUN SERPL-MCNC: 16 MG/DL — SIGNIFICANT CHANGE UP (ref 7–23)
CALCIUM SERPL-MCNC: 8.9 MG/DL — SIGNIFICANT CHANGE UP (ref 8.5–10.1)
CHLORIDE SERPL-SCNC: 109 MMOL/L — HIGH (ref 96–108)
CO2 SERPL-SCNC: 26 MMOL/L — SIGNIFICANT CHANGE UP (ref 22–31)
CREAT SERPL-MCNC: 0.74 MG/DL — SIGNIFICANT CHANGE UP (ref 0.5–1.3)
EGFR: 96 ML/MIN/1.73M2 — SIGNIFICANT CHANGE UP
EOSINOPHIL # BLD AUTO: 0.02 K/UL — SIGNIFICANT CHANGE UP (ref 0–0.5)
EOSINOPHIL NFR BLD AUTO: 0.2 % — SIGNIFICANT CHANGE UP (ref 0–6)
GLUCOSE SERPL-MCNC: 137 MG/DL — HIGH (ref 70–99)
HCT VFR BLD CALC: 33.3 % — LOW (ref 34.5–45)
HGB BLD-MCNC: 11 G/DL — LOW (ref 11.5–15.5)
IMM GRANULOCYTES NFR BLD AUTO: 0.4 % — SIGNIFICANT CHANGE UP (ref 0–1.5)
LYMPHOCYTES # BLD AUTO: 0.96 K/UL — LOW (ref 1–3.3)
LYMPHOCYTES # BLD AUTO: 10 % — LOW (ref 13–44)
MCHC RBC-ENTMCNC: 29.3 PG — SIGNIFICANT CHANGE UP (ref 27–34)
MCHC RBC-ENTMCNC: 33 G/DL — SIGNIFICANT CHANGE UP (ref 32–36)
MCV RBC AUTO: 88.8 FL — SIGNIFICANT CHANGE UP (ref 80–100)
MONOCYTES # BLD AUTO: 0.11 K/UL — SIGNIFICANT CHANGE UP (ref 0–0.9)
MONOCYTES NFR BLD AUTO: 1.1 % — LOW (ref 2–14)
NEUTROPHILS # BLD AUTO: 8.46 K/UL — HIGH (ref 1.8–7.4)
NEUTROPHILS NFR BLD AUTO: 87.9 % — HIGH (ref 43–77)
NRBC # BLD: 0 /100 WBCS — SIGNIFICANT CHANGE UP (ref 0–0)
PLATELET # BLD AUTO: 309 K/UL — SIGNIFICANT CHANGE UP (ref 150–400)
POTASSIUM SERPL-MCNC: 4 MMOL/L — SIGNIFICANT CHANGE UP (ref 3.5–5.3)
POTASSIUM SERPL-SCNC: 4 MMOL/L — SIGNIFICANT CHANGE UP (ref 3.5–5.3)
RBC # BLD: 3.75 M/UL — LOW (ref 3.8–5.2)
RBC # FLD: 12.5 % — SIGNIFICANT CHANGE UP (ref 10.3–14.5)
SODIUM SERPL-SCNC: 141 MMOL/L — SIGNIFICANT CHANGE UP (ref 135–145)
WBC # BLD: 9.63 K/UL — SIGNIFICANT CHANGE UP (ref 3.8–10.5)
WBC # FLD AUTO: 9.63 K/UL — SIGNIFICANT CHANGE UP (ref 3.8–10.5)

## 2022-06-02 PROCEDURE — 20936 SP BONE AGRFT LOCAL ADD-ON: CPT

## 2022-06-02 PROCEDURE — 22552 ARTHRD ANT NTRBD CERVICAL EA: CPT | Mod: AS

## 2022-06-02 PROCEDURE — 20936 SP BONE AGRFT LOCAL ADD-ON: CPT | Mod: AS

## 2022-06-02 PROCEDURE — 20930 SP BONE ALGRFT MORSEL ADD-ON: CPT | Mod: AS

## 2022-06-02 PROCEDURE — 22551 ARTHRD ANT NTRBDY CERVICAL: CPT | Mod: AS

## 2022-06-02 PROCEDURE — 22845 INSERT SPINE FIXATION DEVICE: CPT | Mod: AS

## 2022-06-02 PROCEDURE — 22845 INSERT SPINE FIXATION DEVICE: CPT

## 2022-06-02 PROCEDURE — 22853 INSJ BIOMECHANICAL DEVICE: CPT

## 2022-06-02 PROCEDURE — 20930 SP BONE ALGRFT MORSEL ADD-ON: CPT

## 2022-06-02 PROCEDURE — 22853 INSJ BIOMECHANICAL DEVICE: CPT | Mod: AS,59

## 2022-06-02 PROCEDURE — 22552 ARTHRD ANT NTRBD CERVICAL EA: CPT

## 2022-06-02 PROCEDURE — 22551 ARTHRD ANT NTRBDY CERVICAL: CPT

## 2022-06-02 DEVICE — IMPLANTABLE DEVICE: Type: IMPLANTABLE DEVICE | Status: FUNCTIONAL

## 2022-06-02 DEVICE — GRAFT BONE INFUSE KIT XS: Type: IMPLANTABLE DEVICE | Status: FUNCTIONAL

## 2022-06-02 DEVICE — SURGIFLO MATRIX WITH THROMBIN KIT: Type: IMPLANTABLE DEVICE | Status: FUNCTIONAL

## 2022-06-02 DEVICE — PIN ACP TEMP FIX ENGAGING: Type: IMPLANTABLE DEVICE | Status: FUNCTIONAL

## 2022-06-02 DEVICE — PLATE ARCHON 2 LVL 40MM: Type: IMPLANTABLE DEVICE | Status: FUNCTIONAL

## 2022-06-02 RX ORDER — ALPRAZOLAM 0.25 MG
0.5 TABLET ORAL THREE TIMES A DAY
Refills: 0 | Status: DISCONTINUED | OUTPATIENT
Start: 2022-06-02 | End: 2022-06-03

## 2022-06-02 RX ORDER — ONDANSETRON 8 MG/1
4 TABLET, FILM COATED ORAL ONCE
Refills: 0 | Status: COMPLETED | OUTPATIENT
Start: 2022-06-02 | End: 2022-06-02

## 2022-06-02 RX ORDER — ASCORBIC ACID 60 MG
500 TABLET,CHEWABLE ORAL
Refills: 0 | Status: DISCONTINUED | OUTPATIENT
Start: 2022-06-02 | End: 2022-06-03

## 2022-06-02 RX ORDER — ACETAMINOPHEN 500 MG
1000 TABLET ORAL ONCE
Refills: 0 | Status: COMPLETED | OUTPATIENT
Start: 2022-06-02 | End: 2022-06-02

## 2022-06-02 RX ORDER — METOCLOPRAMIDE HCL 10 MG
10 TABLET ORAL EVERY 6 HOURS
Refills: 0 | Status: DISCONTINUED | OUTPATIENT
Start: 2022-06-02 | End: 2022-06-03

## 2022-06-02 RX ORDER — ACETAMINOPHEN 500 MG
1000 TABLET ORAL ONCE
Refills: 0 | Status: COMPLETED | OUTPATIENT
Start: 2022-06-03 | End: 2022-06-03

## 2022-06-02 RX ORDER — METOCLOPRAMIDE HCL 10 MG
10 TABLET ORAL ONCE
Refills: 0 | Status: COMPLETED | OUTPATIENT
Start: 2022-06-02 | End: 2022-06-02

## 2022-06-02 RX ORDER — SODIUM CHLORIDE 9 MG/ML
3 INJECTION INTRAMUSCULAR; INTRAVENOUS; SUBCUTANEOUS EVERY 8 HOURS
Refills: 0 | Status: DISCONTINUED | OUTPATIENT
Start: 2022-06-02 | End: 2022-06-03

## 2022-06-02 RX ORDER — ONDANSETRON 8 MG/1
4 TABLET, FILM COATED ORAL EVERY 4 HOURS
Refills: 0 | Status: DISCONTINUED | OUTPATIENT
Start: 2022-06-02 | End: 2022-06-03

## 2022-06-02 RX ORDER — SODIUM CHLORIDE 9 MG/ML
1000 INJECTION, SOLUTION INTRAVENOUS ONCE
Refills: 0 | Status: COMPLETED | OUTPATIENT
Start: 2022-06-02 | End: 2022-06-02

## 2022-06-02 RX ORDER — LEVOTHYROXINE SODIUM 125 MCG
25 TABLET ORAL DAILY
Refills: 0 | Status: DISCONTINUED | OUTPATIENT
Start: 2022-06-02 | End: 2022-06-03

## 2022-06-02 RX ORDER — HYDROMORPHONE HYDROCHLORIDE 2 MG/ML
1 INJECTION INTRAMUSCULAR; INTRAVENOUS; SUBCUTANEOUS
Refills: 0 | Status: DISCONTINUED | OUTPATIENT
Start: 2022-06-02 | End: 2022-06-02

## 2022-06-02 RX ORDER — FOLIC ACID 0.8 MG
1 TABLET ORAL DAILY
Refills: 0 | Status: DISCONTINUED | OUTPATIENT
Start: 2022-06-02 | End: 2022-06-03

## 2022-06-02 RX ORDER — TEMAZEPAM 15 MG/1
30 CAPSULE ORAL AT BEDTIME
Refills: 0 | Status: DISCONTINUED | OUTPATIENT
Start: 2022-06-02 | End: 2022-06-03

## 2022-06-02 RX ORDER — HYDROMORPHONE HYDROCHLORIDE 2 MG/ML
0.5 INJECTION INTRAMUSCULAR; INTRAVENOUS; SUBCUTANEOUS EVERY 4 HOURS
Refills: 0 | Status: DISCONTINUED | OUTPATIENT
Start: 2022-06-02 | End: 2022-06-03

## 2022-06-02 RX ORDER — SODIUM CHLORIDE 9 MG/ML
1000 INJECTION, SOLUTION INTRAVENOUS
Refills: 0 | Status: DISCONTINUED | OUTPATIENT
Start: 2022-06-02 | End: 2022-06-03

## 2022-06-02 RX ORDER — SODIUM CHLORIDE 9 MG/ML
3 INJECTION INTRAMUSCULAR; INTRAVENOUS; SUBCUTANEOUS EVERY 8 HOURS
Refills: 0 | Status: DISCONTINUED | OUTPATIENT
Start: 2022-06-02 | End: 2022-06-02

## 2022-06-02 RX ORDER — SODIUM CHLORIDE 9 MG/ML
1000 INJECTION, SOLUTION INTRAVENOUS
Refills: 0 | Status: DISCONTINUED | OUTPATIENT
Start: 2022-06-02 | End: 2022-06-02

## 2022-06-02 RX ORDER — PANTOPRAZOLE SODIUM 20 MG/1
40 TABLET, DELAYED RELEASE ORAL DAILY
Refills: 0 | Status: DISCONTINUED | OUTPATIENT
Start: 2022-06-02 | End: 2022-06-03

## 2022-06-02 RX ORDER — MORPHINE SULFATE 50 MG/1
2 CAPSULE, EXTENDED RELEASE ORAL
Refills: 0 | Status: DISCONTINUED | OUTPATIENT
Start: 2022-06-02 | End: 2022-06-02

## 2022-06-02 RX ORDER — OXYCODONE HYDROCHLORIDE 5 MG/1
5 TABLET ORAL EVERY 6 HOURS
Refills: 0 | Status: DISCONTINUED | OUTPATIENT
Start: 2022-06-02 | End: 2022-06-03

## 2022-06-02 RX ORDER — CYCLOBENZAPRINE HYDROCHLORIDE 10 MG/1
5 TABLET, FILM COATED ORAL THREE TIMES A DAY
Refills: 0 | Status: DISCONTINUED | OUTPATIENT
Start: 2022-06-02 | End: 2022-06-03

## 2022-06-02 RX ADMIN — SODIUM CHLORIDE 3 MILLILITER(S): 9 INJECTION INTRAMUSCULAR; INTRAVENOUS; SUBCUTANEOUS at 21:23

## 2022-06-02 RX ADMIN — Medication 500 MILLIGRAM(S): at 17:17

## 2022-06-02 RX ADMIN — OXYCODONE HYDROCHLORIDE 5 MILLIGRAM(S): 5 TABLET ORAL at 19:05

## 2022-06-02 RX ADMIN — HYDROMORPHONE HYDROCHLORIDE 1 MILLIGRAM(S): 2 INJECTION INTRAMUSCULAR; INTRAVENOUS; SUBCUTANEOUS at 13:59

## 2022-06-02 RX ADMIN — SODIUM CHLORIDE 75 MILLILITER(S): 9 INJECTION, SOLUTION INTRAVENOUS at 11:20

## 2022-06-02 RX ADMIN — Medication 0.5 MILLIGRAM(S): at 17:17

## 2022-06-02 RX ADMIN — ONDANSETRON 4 MILLIGRAM(S): 8 TABLET, FILM COATED ORAL at 17:05

## 2022-06-02 RX ADMIN — HYDROMORPHONE HYDROCHLORIDE 1 MILLIGRAM(S): 2 INJECTION INTRAMUSCULAR; INTRAVENOUS; SUBCUTANEOUS at 14:30

## 2022-06-02 RX ADMIN — ONDANSETRON 4 MILLIGRAM(S): 8 TABLET, FILM COATED ORAL at 21:42

## 2022-06-02 RX ADMIN — MORPHINE SULFATE 2 MILLIGRAM(S): 50 CAPSULE, EXTENDED RELEASE ORAL at 16:23

## 2022-06-02 RX ADMIN — SODIUM CHLORIDE 1000 MILLILITER(S): 9 INJECTION, SOLUTION INTRAVENOUS at 16:10

## 2022-06-02 RX ADMIN — HYDROMORPHONE HYDROCHLORIDE 1 MILLIGRAM(S): 2 INJECTION INTRAMUSCULAR; INTRAVENOUS; SUBCUTANEOUS at 12:12

## 2022-06-02 RX ADMIN — Medication 10 MILLIGRAM(S): at 17:17

## 2022-06-02 RX ADMIN — SODIUM CHLORIDE 3 MILLILITER(S): 9 INJECTION INTRAMUSCULAR; INTRAVENOUS; SUBCUTANEOUS at 16:16

## 2022-06-02 RX ADMIN — Medication 100 MILLIGRAM(S): at 16:11

## 2022-06-02 RX ADMIN — OXYCODONE HYDROCHLORIDE 5 MILLIGRAM(S): 5 TABLET ORAL at 18:16

## 2022-06-02 RX ADMIN — Medication 400 MILLIGRAM(S): at 21:41

## 2022-06-02 RX ADMIN — Medication 1000 MILLIGRAM(S): at 22:00

## 2022-06-02 RX ADMIN — MORPHINE SULFATE 2 MILLIGRAM(S): 50 CAPSULE, EXTENDED RELEASE ORAL at 16:55

## 2022-06-02 RX ADMIN — Medication 100 MILLIGRAM(S): at 21:41

## 2022-06-02 RX ADMIN — Medication 10 MILLIGRAM(S): at 22:55

## 2022-06-02 RX ADMIN — SODIUM CHLORIDE 100 MILLILITER(S): 9 INJECTION, SOLUTION INTRAVENOUS at 16:11

## 2022-06-02 RX ADMIN — HYDROMORPHONE HYDROCHLORIDE 1 MILLIGRAM(S): 2 INJECTION INTRAMUSCULAR; INTRAVENOUS; SUBCUTANEOUS at 11:45

## 2022-06-02 NOTE — PATIENT PROFILE ADULT - ARE SIGNIFICANT INDICATORS COMPLETE.
Message   Recorded as Task   Date: 08/29/2017 02:28 PM, Created By: Tonya Richter   Task Name: 4. Patient Message   Assigned To: RUBEN HUDDLESTON   Regarding Patient: REAGAN ALCANTARA, Status: In Progress   Comment:    Tonya Richter - 29 Aug 2017 2:28 PM     Patient Message to Provider  \"REASON FOR CALL: Patient's nurse, Altagracia is requesting a call back from doctor.     CALLER'S RELATIONSHIP TO PATIENT: Nurse  IF OTHER, NAME AND RELATIONSHIP: Altagracia    BEST NUMBER TO BE CONTACTED: [phone]  ALTERNATIVE PHONE NUMBER: ,,,    Turnaround time given to caller:  \"\"THIS MESSAGE WILL BE SENT TO YOUR PROVIDER, THE CLINICAL TEAM WILL RETURN YOUR CALL AS SOON AS THEY HAVE REVIEWED YOUR MESSAGE\"\"    READ BACK MESSAGE TO PATIENT\"   Nida Fitzpatrick - 29 Aug 2017 2:59 PM     TASK EDITED   RUBEN HUDDLESTON - 29 Aug 2017 5:30 PM     TASK EDITED  LM for Altagracia to call me back.   RUBEN HUDDLESTON - 29 Aug 2017 5:30 PM     TASK IN PROGRESS   RUBEN HUDDLESTON - 30 Aug 2017 12:41 PM     TASK EDITED  d/w ALESIA...low BP over previous days.  pt skipped her lisinopril last night. Today BP is 117/70.  Will keep her off the evening lisinopril        Signatures   Electronically signed by : RUBEN HUDDLESTON M.D.; Aug 30 2017 12:42PM CST     No Yes

## 2022-06-02 NOTE — DISCHARGE NOTE PROVIDER - NSDCFUADDINST_GEN_ALL_CORE_FT
Keep Dressing Clean, Dry and Intact. May shower on POD#5 with Dressing on. Dressing may be removed on POD#7. Please do not scrub, soak, peel or pick at the dressing. No creams, lotions, or oils over dressing. May shower on POD#5 and let water run over dressing, no baths. Pat dry once out of shower.     If dressing is saturated from border to border. Remove dressing and cover with clean dry dressing.   Spinal Precautions:   NO NSAIDS/ANTICOAGULATION/ANTIPLATELET MEDICATIONS until approved by Dr. Douglas  NO FLEXION/EXTENSION/TWISTING/PUSHING/PULLING/CARRYING/LIFTING OR BENDING until approved by Dr. Douglas

## 2022-06-02 NOTE — CONSULT NOTE ADULT - SUBJECTIVE AND OBJECTIVE BOX
ANDREW AGUILAR is a 52y Female s/p ANTERIOR CERVICAL DISCECTOMY FUSION 6 WITH IMAGE    ANTERIOR CERVICAL DISCECTOMY FUSION C3-C6 WITH IMAGE      w/ h/o Chronic cough    Hypothyroidism    Tracheomalacia    IBS (irritable bowel syndrome)    Anxiety    Encounter for cosmetic surgery    Low serum vitamin D    Allergic rhinitis, seasonal    GERD (gastroesophageal reflux disease)    Esophageal dysmotility      denies any chest pain shortness of breath palpitation dizziness lightheadedness nausea vomiting fever or chills    History of abdominoplasty    History of parotidectomy    History of carpal tunnel release    H/O arthroscopy of right knee    Status post arthroscopy of hip    History of cataract surgery    H/O bilateral breast reduction surgery    History of lipoma    H/O tubal ligation    History of bladder surgery    Status post right breast lumpectomy    S/P tracheoplasty    H/O parotidectomy    H/O shoulder surgery      Family history of heart disease (Father)    Family history of diabetes mellitus (Father, Mother)    Family history of hypertension (Father)      SH: doesnot smoke or drink at this time    codeine (Urticaria)  Hycodan (Rash)  niacin (Flushing)  penicillin (Rash)  prednisoLONE (Swelling)  Vicodin (Pruritus)    acetaminophen   IVPB .. 1000 milliGRAM(s) IV Intermittent once  ALPRAZolam 0.5 milliGRAM(s) Oral three times a day PRN  ascorbic acid 500 milliGRAM(s) Oral two times a day  cyclobenzaprine 5 milliGRAM(s) Oral three times a day PRN  folic acid 1 milliGRAM(s) Oral daily  HYDROmorphone  Injectable 0.5 milliGRAM(s) IV Push every 4 hours PRN  lactated ringers. 1000 milliLiter(s) IV Continuous <Continuous>  levothyroxine 25 MICROGram(s) Oral daily  metoclopramide Injectable 10 milliGRAM(s) IV Push once  metoclopramide Injectable 10 milliGRAM(s) IV Push every 6 hours PRN  multivitamin 1 Tablet(s) Oral daily  ondansetron Injectable 4 milliGRAM(s) IV Push every 4 hours PRN  oxyCODONE    IR 5 milliGRAM(s) Oral every 6 hours PRN  pantoprazole   Suspension 40 milliGRAM(s) Oral daily  sodium chloride 0.9% lock flush 3 milliLiter(s) IV Push every 8 hours  temazepam 30 milliGRAM(s) Oral at bedtime PRN    T(C): 36.4 (06-02-22 @ 21:00), Max: 36.9 (06-02-22 @ 12:29)  HR: 71 (06-02-22 @ 21:00) (69 - 89)  BP: 117/77 (06-02-22 @ 21:00) (91/58 - 133/80)  RR: 18 (06-02-22 @ 21:00) (12 - 18)  SpO2: 99% (06-02-22 @ 21:00) (95% - 100%)  HEENT unremarkable  neck no JVD or bruit  heart normal S1 S2 RRR no gallops or rubs  chest clear to auscultation  abd sof nontender non distended +bs  ext no calf tenderness    A/P   DVT PX  pain control  bowel regimen   wound care as per ortho  GI PX  antiemetics prn  incentive spirometer

## 2022-06-02 NOTE — DISCHARGE NOTE PROVIDER - NSDCFUSCHEDAPPT_GEN_ALL_CORE_FT
[facility]  [address]  Scheduled Appointment: 06/20/2022    Mauricio Montesinos  [facility]  [address]  Scheduled Appointment: 06/30/2022

## 2022-06-02 NOTE — DISCHARGE NOTE PROVIDER - HOSPITAL COURSE
52yFemale with history of Cervical Radiculopathy presenting for ACDF C3-C5 by Dr. Douglas on 6/2/22. Risk and benefits of surgery were explained to the patient. The patient understood and agreed to proceed with surgery. Patient underwent the procedure with no intraoperative complications. Pt was brought in stable condition to the PACU. Once stable in PACU, pt was brought to the floor. During hospital stay pt was followed by Medicine, physical therapy, Home Care during this admission. Pt had an uneventful hospital course. Pt is stable for discharge to home

## 2022-06-02 NOTE — DISCHARGE NOTE PROVIDER - NSDCCPTREATMENT_GEN_ALL_CORE_FT
PRINCIPAL PROCEDURE  Procedure: Anterior cervical discectomy and fusion (ACDF)  Findings and Treatment: C3-5

## 2022-06-02 NOTE — PHYSICAL THERAPY INITIAL EVALUATION ADULT - ADDITIONAL COMMENTS
Patient lives in a private house with no steps to enter, no steps to negotiate once inside. Patient denies owning DME.

## 2022-06-02 NOTE — PATIENT PROFILE ADULT. - PROVIDER NOTIFICATION
[FreeTextEntry1] : Ramila is a 60-year-old female with medical history detailed above and active medical issues including:\par \par - Palpitations, PSVT less frequent symptoms on Toprol.  If further PSVT will consider electrophysiology consultation with Dr Sutton for SVT ablation. \par \par - No exertional symptoms, normal perfusion Myoview stress test with normal LVEF by 2021\par \par Advised patient to follow active lifestyle with regular cardiovascular exercise. Patient educated on lifestyle and diet modification with low sodium low fat diet and avoidance of excessive alcohol. Patient is aware to call with any symptoms or concerns.\par \par Current cardiac medications remain unchanged and renewals  are up to date. Repeat labs will be ordered with PMD.\par \par Ramila will follow up with Dr Gaston Jha for primary care.\par \par  Declines

## 2022-06-02 NOTE — BRIEF OPERATIVE NOTE - NSICDXBRIEFPROCEDURE_GEN_ALL_CORE_FT
PROCEDURES:  Anterior cervical discectomy and fusion (ACDF) 02-Jun-2022 10:55:33 C3-5 Tejinder Lin

## 2022-06-02 NOTE — DISCHARGE NOTE PROVIDER - CARE PROVIDER_API CALL
Nelson Douglas)  Orthopaedic Surgery  [address] [place] [address]  Phone: [phone]  Fax: [phone]  Follow Up Time:

## 2022-06-02 NOTE — DISCHARGE NOTE PROVIDER - NSDCMRMEDTOKEN_GEN_ALL_CORE_FT
covid 19 PCR swab 3-5 days prior to surgery : 1  nasal once a day   levothyroxine 25 mcg (0.025 mg) oral tablet: 1 tab(s) orally once a day  Linzess 290 mcg oral capsule: 1 cap(s) orally once a day  Nexletol 180 mg oral tablet: orally once a day (at bedtime)  pantoprazole 40 mg oral granule, delayed release: 1 each orally once a day  Restoril 30 mg oral capsule: 1 cap(s) orally once a day (at bedtime)  Xanax 0.5 mg oral tablet: 1 tab(s) orally 3 times a day, As Needed   ascorbic acid 500 mg oral tablet: 1 tab(s) orally 2 times a day  Colace 50 mg oral capsule: 1 cap(s) orally 2 times a day  cyclobenzaprine 10 mg oral tablet: 1 tab(s) orally every 8 hours MDD:3  levothyroxine 25 mcg (0.025 mg) oral tablet: 1 tab(s) orally once a day  Linzess 290 mcg oral capsule: 1 cap(s) orally once a day  Multiple Vitamins oral tablet: 1 tab(s) orally once a day  Nexletol 180 mg oral tablet: orally once a day (at bedtime)  oxyCODONE 5 mg oral tablet: 1-2 tab(s) orally every 4 hours, As needed, for pain MDD:10  pantoprazole 40 mg oral granule, delayed release: 1 each orally once a day  Restoril 30 mg oral capsule: 1 cap(s) orally once a day (at bedtime)  Xanax 0.5 mg oral tablet: 1 tab(s) orally 3 times a day, As Needed  Zofran 4 mg oral tablet: 1 tab(s) orally every 6 hours MDD:4

## 2022-06-02 NOTE — PATIENT PROFILE ADULT - FALL HARM RISK - UNIVERSAL INTERVENTIONS
Bed in lowest position, wheels locked, appropriate side rails in place/Call bell, personal items and telephone in reach/Instruct patient to call for assistance before getting out of bed or chair/Non-slip footwear when patient is out of bed/[place] to call system/Physically safe environment - no spills, clutter or unnecessary equipment/Purposeful Proactive Rounding/Room/bathroom lighting operational, light cord in reach

## 2022-06-03 ENCOUNTER — TRANSCRIPTION ENCOUNTER (OUTPATIENT)
Age: 52
End: 2022-06-03

## 2022-06-03 VITALS
RESPIRATION RATE: 18 BRPM | HEART RATE: 63 BPM | OXYGEN SATURATION: 95 % | DIASTOLIC BLOOD PRESSURE: 72 MMHG | SYSTOLIC BLOOD PRESSURE: 111 MMHG | TEMPERATURE: 97 F

## 2022-06-03 LAB
ANION GAP SERPL CALC-SCNC: 9 MMOL/L — SIGNIFICANT CHANGE UP (ref 5–17)
BASOPHILS # BLD AUTO: 0.02 K/UL — SIGNIFICANT CHANGE UP (ref 0–0.2)
BASOPHILS NFR BLD AUTO: 0.2 % — SIGNIFICANT CHANGE UP (ref 0–2)
BUN SERPL-MCNC: 16 MG/DL — SIGNIFICANT CHANGE UP (ref 7–23)
CALCIUM SERPL-MCNC: 9 MG/DL — SIGNIFICANT CHANGE UP (ref 8.5–10.1)
CHLORIDE SERPL-SCNC: 101 MMOL/L — SIGNIFICANT CHANGE UP (ref 96–108)
CO2 SERPL-SCNC: 27 MMOL/L — SIGNIFICANT CHANGE UP (ref 22–31)
CREAT SERPL-MCNC: 0.6 MG/DL — SIGNIFICANT CHANGE UP (ref 0.5–1.3)
EGFR: 107 ML/MIN/1.73M2 — SIGNIFICANT CHANGE UP
EOSINOPHIL # BLD AUTO: 0.01 K/UL — SIGNIFICANT CHANGE UP (ref 0–0.5)
EOSINOPHIL NFR BLD AUTO: 0.1 % — SIGNIFICANT CHANGE UP (ref 0–6)
GLUCOSE SERPL-MCNC: 101 MG/DL — HIGH (ref 70–99)
HCT VFR BLD CALC: 34.4 % — LOW (ref 34.5–45)
HGB BLD-MCNC: 11.3 G/DL — LOW (ref 11.5–15.5)
IMM GRANULOCYTES NFR BLD AUTO: 0.4 % — SIGNIFICANT CHANGE UP (ref 0–1.5)
LYMPHOCYTES # BLD AUTO: 1.92 K/UL — SIGNIFICANT CHANGE UP (ref 1–3.3)
LYMPHOCYTES # BLD AUTO: 16.7 % — SIGNIFICANT CHANGE UP (ref 13–44)
MCHC RBC-ENTMCNC: 29.4 PG — SIGNIFICANT CHANGE UP (ref 27–34)
MCHC RBC-ENTMCNC: 32.8 G/DL — SIGNIFICANT CHANGE UP (ref 32–36)
MCV RBC AUTO: 89.6 FL — SIGNIFICANT CHANGE UP (ref 80–100)
MONOCYTES # BLD AUTO: 1 K/UL — HIGH (ref 0–0.9)
MONOCYTES NFR BLD AUTO: 8.7 % — SIGNIFICANT CHANGE UP (ref 2–14)
NEUTROPHILS # BLD AUTO: 8.53 K/UL — HIGH (ref 1.8–7.4)
NEUTROPHILS NFR BLD AUTO: 73.9 % — SIGNIFICANT CHANGE UP (ref 43–77)
NRBC # BLD: 0 /100 WBCS — SIGNIFICANT CHANGE UP (ref 0–0)
PLATELET # BLD AUTO: 331 K/UL — SIGNIFICANT CHANGE UP (ref 150–400)
POTASSIUM SERPL-MCNC: 4.1 MMOL/L — SIGNIFICANT CHANGE UP (ref 3.5–5.3)
POTASSIUM SERPL-SCNC: 4.1 MMOL/L — SIGNIFICANT CHANGE UP (ref 3.5–5.3)
RBC # BLD: 3.84 M/UL — SIGNIFICANT CHANGE UP (ref 3.8–5.2)
RBC # FLD: 12.8 % — SIGNIFICANT CHANGE UP (ref 10.3–14.5)
SODIUM SERPL-SCNC: 137 MMOL/L — SIGNIFICANT CHANGE UP (ref 135–145)
WBC # BLD: 11.53 K/UL — HIGH (ref 3.8–10.5)
WBC # FLD AUTO: 11.53 K/UL — HIGH (ref 3.8–10.5)

## 2022-06-03 RX ORDER — ACETAMINOPHEN 500 MG
1000 TABLET ORAL ONCE
Refills: 0 | Status: COMPLETED | OUTPATIENT
Start: 2022-06-03 | End: 2022-06-03

## 2022-06-03 RX ORDER — ONDANSETRON 8 MG/1
1 TABLET, FILM COATED ORAL
Qty: 28 | Refills: 0
Start: 2022-06-03 | End: 2022-06-09

## 2022-06-03 RX ORDER — CYCLOBENZAPRINE HYDROCHLORIDE 10 MG/1
1 TABLET, FILM COATED ORAL
Qty: 15 | Refills: 0
Start: 2022-06-03 | End: 2022-06-07

## 2022-06-03 RX ORDER — OXYCODONE HYDROCHLORIDE 5 MG/1
1 TABLET ORAL
Qty: 42 | Refills: 0
Start: 2022-06-03 | End: 2022-06-09

## 2022-06-03 RX ORDER — ASCORBIC ACID 60 MG
1 TABLET,CHEWABLE ORAL
Qty: 0 | Refills: 0 | DISCHARGE
Start: 2022-06-03

## 2022-06-03 RX ADMIN — PANTOPRAZOLE SODIUM 40 MILLIGRAM(S): 20 TABLET, DELAYED RELEASE ORAL at 11:40

## 2022-06-03 RX ADMIN — Medication 0.5 MILLIGRAM(S): at 04:36

## 2022-06-03 RX ADMIN — Medication 1 TABLET(S): at 11:39

## 2022-06-03 RX ADMIN — Medication 25 MICROGRAM(S): at 06:00

## 2022-06-03 RX ADMIN — Medication 400 MILLIGRAM(S): at 04:36

## 2022-06-03 RX ADMIN — Medication 400 MILLIGRAM(S): at 11:53

## 2022-06-03 RX ADMIN — Medication 500 MILLIGRAM(S): at 06:00

## 2022-06-03 RX ADMIN — HYDROMORPHONE HYDROCHLORIDE 0.5 MILLIGRAM(S): 2 INJECTION INTRAMUSCULAR; INTRAVENOUS; SUBCUTANEOUS at 09:27

## 2022-06-03 RX ADMIN — SODIUM CHLORIDE 3 MILLILITER(S): 9 INJECTION INTRAMUSCULAR; INTRAVENOUS; SUBCUTANEOUS at 05:59

## 2022-06-03 RX ADMIN — SODIUM CHLORIDE 3 MILLILITER(S): 9 INJECTION INTRAMUSCULAR; INTRAVENOUS; SUBCUTANEOUS at 13:08

## 2022-06-03 RX ADMIN — SODIUM CHLORIDE 100 MILLILITER(S): 9 INJECTION, SOLUTION INTRAVENOUS at 11:39

## 2022-06-03 RX ADMIN — Medication 1 MILLIGRAM(S): at 11:40

## 2022-06-03 RX ADMIN — Medication 1000 MILLIGRAM(S): at 05:00

## 2022-06-03 RX ADMIN — CYCLOBENZAPRINE HYDROCHLORIDE 5 MILLIGRAM(S): 10 TABLET, FILM COATED ORAL at 02:21

## 2022-06-03 RX ADMIN — Medication 10 MILLIGRAM(S): at 09:27

## 2022-06-03 NOTE — DISCHARGE NOTE NURSING/CASE MANAGEMENT/SOCIAL WORK - NSDCPEFALRISK_GEN_ALL_CORE
For information on Fall & Injury Prevention, visit: [email or web address] OR  [email or web address] OR  https://www.cdc.gov/steadi/patient.html

## 2022-06-03 NOTE — DISCHARGE NOTE NURSING/CASE MANAGEMENT/SOCIAL WORK - PATIENT PORTAL LINK FT
You can access the FollowMyHealth Patient Portal offered by [facility] by registering at the following website: [email or web address]. By joining [email or web address]’s FollowMyHealth portal, you will also be able to view your health information using other applications (apps) compatible with our system.

## 2022-06-03 NOTE — PROGRESS NOTE ADULT - SUBJECTIVE AND OBJECTIVE BOX
52yFemale s/p ACDF C3-6 POD#1  Pt seen and examined in NAD.   Pain controlled.  Pt denies any new complaints.   Pt denies CP/SOB/N/V/D/numbness/tingling/bowel or bladder dysfunction.   +tolerating diet    Vital Signs Last 24 Hrs  T(C): 36.7 (03 Jun 2022 11:18), Max: 36.9 (02 Jun 2022 12:29)  T(F): 98.1 (03 Jun 2022 11:18), Max: 98.5 (02 Jun 2022 12:29)  HR: 73 (03 Jun 2022 11:18) (68 - 73)  BP: 115/71 (03 Jun 2022 11:18) (91/58 - 133/80)  BP(mean): --  RR: 17 (03 Jun 2022 11:18) (14 - 18)  SpO2: 98% (03 Jun 2022 11:18) (95% - 100%)    PE:   General: A&Ox3, NAD  Neck: Dressing c/d/i - BECKI removed  B/L UE: Skin intact. +ROM shoulder/elbow/wrist/fingers. +ok/thumbsup/fingercross signs.  strength: 5/5.  RP2+ NVI.   B/L LE: Skin intact. +ROM hip/knee/ankle/toes. Ankle Dorsi/plantarflexion: 5/5. Calf: soft, compressible and nontender. DP/PT 2+ NVI.                             11.3   11.53 )-----------( 331      ( 03 Jun 2022 06:37 )             34.4       06-03    137  |  101  |  16  ----------------------------<  101<H>  4.1   |  27  |  0.60    Ca    9.0      03 Jun 2022 06:37          A/P: 52yFemale s/p ACDF C3-6 POD#1  BECKI removed- tip intact  New sterile dressing applied  Wound care  Pain controlled  PT: WBAT: Spinal precautions  Isometric exercises  DVT ppx: SCDs  Incentive spirometry counseled   Discharge: planning   All the above discussed and understood by pt   
Post-op Check   POD#0 S/P ACDF C3-C5  52yFemale Patient seen and examined, Pain controlled  Patient Denies SOB, CP, N/V/D       PE: C-Spine: Dressing C/D/I, BECKI intact, Neck Soft, Sensation/motor intact    B/L UE: Skin intact. +ROM shoulder/elbow/wrist/fingers. +ok/thumbsup/fingercross signs.  strength: 5/5.  RP2+ NVI.   B/L LE: Skin intact. +ROM hip/knee/ankle/toes. Ankle Dorsi/plantarflexion: 5/5. Calf: soft, compressible and nontender. DP/PT 2+ NVI                          11.0   9.63  )-----------( 309      ( 02 Jun 2022 11:33 )             33.3       06-02    141  |  109<H>  |  16  ----------------------------<  137<H>  4.0   |  26  |  0.74    Ca    8.9      02 Jun 2022 11:33          A: As above   P: Pain Control       DVT Prophylaxis      Incentive spirometry      Monitor BECKI output       PT WBAT      Isometric exercises      Discharge Planning      All the above discussed and understood by pt       Ortho to F/U 
ANDREW AGUILAR is a 52y Female s/p ANTERIOR CERVICAL DISCECTOMY FUSION 6 WITH IMAGE    ANTERIOR CERVICAL DISCECTOMY FUSION C3-C6 WITH IMAGE        denies any chest pain shortness of breath palpitation dizziness lightheadedness nausea vomiting fever or chills    T(C): 36.3 (06-03-22 @ 09:34), Max: 36.9 (06-02-22 @ 12:29)  HR: 69 (06-03-22 @ 09:34) (68 - 89)  BP: 103/69 (06-03-22 @ 09:34) (91/58 - 133/80)  RR: 18 (06-03-22 @ 09:34) (12 - 18)  SpO2: 95% (06-03-22 @ 09:34) (95% - 100%)  no jvd/bruit  s1 s2 rrr  cta  s/nt/nd  no calf tend                        11.3   11.53 )-----------( 331      ( 03 Jun 2022 06:37 )             34.4   06-03    137  |  101  |  16  ----------------------------<  101<H>  4.1   |  27  |  0.60    Ca    9.0      03 Jun 2022 06:37        cont dvt px  pain control  bowel regimen  antiemetics  incentive spirometer

## 2022-06-07 DIAGNOSIS — Z88.8 ALLERGY STATUS TO OTHER DRUGS, MEDICAMENTS AND BIOLOGICAL SUBSTANCES STATUS: ICD-10-CM

## 2022-06-07 DIAGNOSIS — E78.5 HYPERLIPIDEMIA, UNSPECIFIED: ICD-10-CM

## 2022-06-07 DIAGNOSIS — E03.9 HYPOTHYROIDISM, UNSPECIFIED: ICD-10-CM

## 2022-06-07 DIAGNOSIS — K21.9 GASTRO-ESOPHAGEAL REFLUX DISEASE WITHOUT ESOPHAGITIS: ICD-10-CM

## 2022-06-07 DIAGNOSIS — K58.9 IRRITABLE BOWEL SYNDROME WITHOUT DIARRHEA: ICD-10-CM

## 2022-06-07 DIAGNOSIS — M46.02 SPINAL ENTHESOPATHY, CERVICAL REGION: ICD-10-CM

## 2022-06-07 DIAGNOSIS — J30.9 ALLERGIC RHINITIS, UNSPECIFIED: ICD-10-CM

## 2022-06-07 DIAGNOSIS — M50.11 CERVICAL DISC DISORDER WITH RADICULOPATHY, HIGH CERVICAL REGION: ICD-10-CM

## 2022-06-07 DIAGNOSIS — Z88.0 ALLERGY STATUS TO PENICILLIN: ICD-10-CM

## 2022-06-07 DIAGNOSIS — Z88.5 ALLERGY STATUS TO NARCOTIC AGENT: ICD-10-CM

## 2022-06-07 DIAGNOSIS — M48.02 SPINAL STENOSIS, CERVICAL REGION: ICD-10-CM

## 2022-06-07 DIAGNOSIS — F41.9 ANXIETY DISORDER, UNSPECIFIED: ICD-10-CM

## 2022-06-07 DIAGNOSIS — K22.89 OTHER SPECIFIED DISEASE OF ESOPHAGUS: ICD-10-CM

## 2022-06-09 ENCOUNTER — NON-APPOINTMENT (OUTPATIENT)
Age: 52
End: 2022-06-09

## 2022-06-14 ENCOUNTER — NON-APPOINTMENT (OUTPATIENT)
Age: 52
End: 2022-06-14

## 2022-06-14 DIAGNOSIS — K59.00 CONSTIPATION, UNSPECIFIED: ICD-10-CM

## 2022-06-14 RX ORDER — SODIUM SULFATE, POTASSIUM SULFATE, MAGNESIUM SULFATE 17.5; 3.13; 1.6 G/ML; G/ML; G/ML
17.5-3.13-1.6 SOLUTION, CONCENTRATE ORAL
Qty: 1 | Refills: 0 | Status: DISCONTINUED | COMMUNITY
Start: 2022-06-14 | End: 2022-06-14

## 2022-06-20 ENCOUNTER — APPOINTMENT (OUTPATIENT)
Dept: ORTHOPEDIC SURGERY | Facility: CLINIC | Age: 52
End: 2022-06-20
Payer: COMMERCIAL

## 2022-06-20 VITALS — HEIGHT: 64 IN | BODY MASS INDEX: 29.02 KG/M2 | WEIGHT: 170 LBS

## 2022-06-20 PROCEDURE — 99024 POSTOP FOLLOW-UP VISIT: CPT

## 2022-06-20 PROCEDURE — 72040 X-RAY EXAM NECK SPINE 2-3 VW: CPT

## 2022-06-21 NOTE — HISTORY OF PRESENT ILLNESS
[Neck] : neck [Lower back] : lower back [Gradual] : gradual [Sudden] : sudden [7] : 7 [6] : 6 [Burning] : burning [Shooting] : shooting [Stabbing] : stabbing [Intermittent] : intermittent [Walking] : walking [Exercising] : exercising [Full time] : Work status: full time [de-identified] : 7/28/21: Here for f/u. Plan at last "Will eventually seek surgery for left shoulder. As far as neck has somewhat mild degenerative changes - no specific treatment indicated for this - suspect the shoulder the main issue based on current presentation of symptoms." She states she was initially doing well after surgery, but has since gotten increased pain after the first couple of sessions of PT. She states she has slowly improved, but the neck has been more bothersome lately. \par \par Had surgery with Dr Felder and was referred here for her neck \par \par had an episode with steroids which weren't helped\par \par 8/11/21: Here for MRI review. Plan at last "recently had left shoulder surgery and has pain in the left shoulder - referred for updated neck eval - indicated for updated MRI of the C spine for eval" - pain in the neck and to the left shoulder \par \par MRI C-spine 8/4/21:\par 1. C3-4: Left foraminal disc herniation causing moderate left foraminal stenosis. Dorsal annular fissure. No change.\par 2. C4-5: Broad-based central disc herniation impinging the ventral spinal cord. Dorsal annular fissure. Disc space narrowing. No change.\par 3. C6-7: Disc bulge abuts the ventral spinal cord. The bulge narrows the neuroforamina. Disc space narrowing and chronic vertebral endplate changes. No change.\par 4. C7-T1: Central disc herniation indents the thecal sac. No change.\par \par 3/12/22: plan last visit - "rec PT - reviewed the updated MRi - no change - lidocaine patch - no good surgical target" - here back for the neck - has had injections with Dr Sanches and has seen neurologic - no relief - neck pain and into the left shoulder and into the left arm remains - it is not responding to any treatment - pain limits her activity \par \par saw neurology and had further tests done and was told everything was coming from the neck\par \par 5/25/22: Here for follow-up; plan last visit was, " reviewed the case with her - persistent pain in the neck and the left side of the shoulder and into the left arm - reviewed tHE mri AND THERE IS neurologic narrowing in a correlating fashion - has tried extensive conservative tx and seen multiple providers who have been unable to provide relief and also have indicated that they feel her neck is the source of her pain - in light of all this she is indicated for. acdf C3-6. discussion of the surgery - the r/b/e discussed at length. she is well informed and would like to proceed\par \par here for pre op \par \par neck pain and into the left shoulder remains \par lwoer back pain and into the rigth thight \par has new mris which we reviewed today of the C and L spine \par \par MRi C spine - left sided NF stenosis C3-6\par MRI L spine - mild stenosis L3-4 and L4-5 \par \par 6/21/22: Here for fu post op - wound has been dry - has been suffering with constipation/nausua - not really able to take pain medications due to the side effects - wearing collar - swallowing slowly improving\par \par xrays today:\par C spine - implants in good position [] : no [FreeTextEntry1] : c spine and l spine  [de-identified] : xrays mris  [de-identified] : pt recently had an mri done of her l spine and was just following up to see if thewre was any issues with the upcoming surgery  [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

## 2022-06-21 NOTE — DISCUSSION/SUMMARY
[de-identified] : post op care discussed \par bone stim\par fu 4 weeks \par no heavy lifting\par wound care discussed \par tramadol

## 2022-06-25 NOTE — HISTORY OF PRESENT ILLNESS
[FreeTextEntry1] : This is a followup visit for Ms. Lawrence; to reassess chest pain.\par \par H/o chest pain/non-cardiac, dysphagia, poor esophageal motility, s/p tracheo- bronchoplasty (2018) with baseline SOB, hypothyroid on rx, obese, anxiety. \par \par No personal h/o MI. FH of CAD, including father at age 52. No HTN. Mild hyperlipidemia, not on rx. Non-smoker. No DM.\par \par Echo 9/2021: Mild LVH, o/w normal\par Cardiac CT 9/15/2020:\par no coronary calcium\par LVEF 60%\par mild atherosclerosis; nothing flow-limiting\par \par Holter monitor 7/2021:\par short runs of SVT (longest 8 beats)\par \par Labs 5/2021:\par creat 0.83, LFT's normal\par chol 288, HDL 82, trig 130,  mg/dL\par Alc 5.6%\par \par At her last visit in July 2021, the following issues were discussed:\par Chest pain (786.50) (R07.9)\par  · H/o atypical chest symptoms. CTA coronaries showed no significant CAD with cor\par     calcium of 0. Suggest preventive strategies, including statin therapy. No further imaging\par     indicated.\par Hyperlipemia (272.4) (E78.5)\par  · Discussed need to alter diet/improve activity. Given recent lipid data (and very mild athero\par     noted on CTA), she warrants statin therapy. Risks:benefits noted. She follows up with\par     Endocrine ([facility]) for her thyroid.\par Palpitation (785.1) (R00.2)\par  · At times, notes "heart racing" sensation. No syncope or other related symptoms. Will\par     place long-term monitor to assess for arrhythmia. My sense is that she is having mild\par     sinus tachy at times, perhaps related to anxiety and/or poor physical shape/weight.\par \par Recent issues with tracheal obstruction/tracheobronchomalacia

## 2022-06-28 ENCOUNTER — NON-APPOINTMENT (OUTPATIENT)
Age: 52
End: 2022-06-28

## 2022-06-30 ENCOUNTER — APPOINTMENT (OUTPATIENT)
Dept: CARDIOLOGY | Facility: CLINIC | Age: 52
End: 2022-06-30

## 2022-07-12 ENCOUNTER — APPOINTMENT (OUTPATIENT)
Dept: ORTHOPEDIC SURGERY | Facility: CLINIC | Age: 52
End: 2022-07-12

## 2022-07-12 ENCOUNTER — FORM ENCOUNTER (OUTPATIENT)
Age: 52
End: 2022-07-12

## 2022-07-12 VITALS — WEIGHT: 170 LBS | BODY MASS INDEX: 29.02 KG/M2 | HEIGHT: 64 IN

## 2022-07-12 DIAGNOSIS — M75.02 ADHESIVE CAPSULITIS OF LEFT SHOULDER: ICD-10-CM

## 2022-07-12 PROCEDURE — 99214 OFFICE O/P EST MOD 30 MIN: CPT

## 2022-07-12 NOTE — ASSESSMENT
[FreeTextEntry1] : Left AC sprain, adhesive capsulitis. \par Now s/p L SA, SAD, acr, syn, GHD, DCE, capsular release.\par  She is s/p cervical fusion with Dr. Douglas.\par She has had a return of pain, flare of adhesive capsulitis?\par Repeat MRI.\par RTO for review.

## 2022-07-12 NOTE — PHYSICAL EXAM
[Left] : left shoulder [5 ___] : forward flexion 5[unfilled]/5 [5___] : external rotation 5[unfilled]/5 [] : no erythema [FreeTextEntry9] : FE: L 90 \par ER: L 10

## 2022-07-12 NOTE — HISTORY OF PRESENT ILLNESS
[6] : 6 [Part time] : Work status: part time [de-identified] : DOS: 6/18/21 L SA, SAD, acr, syn, GHD, DCE, capsular release\par \par 7/12/22: Here for follow up.  She is recovering from neck surgery.  She has pain in the upper arm.  She has pain with lifting and pain with ER.   \par \par 5/19/222: Here for follow up.  She is having surgery with Dr. Douglas. \par \par 12/14/21: Here for follow up. Her shoulder and ROM has improved significantly. She does an HEP. She does have pain from the neck into the left shoulder.\par \par 9/28/21: Here for f/u of L shoulder. She has experienced improvement with HEP.\par \par 8/17/21: Here for follow up. She did improve but she continued to have pain in the biceps\par \par 7/20/21: She had a flare up in her PT with doing some band exercises. She has pain with reaching.\par \par 6/29/21: Here for first follow up. She started HEP, no longer in sling.\par \par 6/1/21: Here for follow up. She was scheduled for surgery with Dr. Milan at [facility] but was cancelled for surgery due to her tracheobronchiomalacia. She has pain ant, post and into the neck. She saw Dr. Douglas who cleared her from her neck. She did PT but her pain is continued. She has pain lifting the arm.\par \par MRI L shoulder:\par 1. Subchondral fracture with posttraumatic osteolysis of the clavicular head with AC ligament sprain. No\par separation.\par 2. Superior labral tear versus labral foramen. Anterior inferior labral fraying.\par 3. Capsular thickening anterior, which can be seen with adhesive capsulitis.\par 4. Glenohumeral joint effusion.\par \par 2/9/21: 51 yo RHD female with left shoulder pain since 1/25/21. Denies specific injury. She reports pain from her shoulder to her elbow. There was pain and tingling with shoveling. There is soreness and heaviness to her arm. She saw PMD, unable to take steroids due to swelling. 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

## 2022-07-13 ENCOUNTER — NON-APPOINTMENT (OUTPATIENT)
Age: 52
End: 2022-07-13

## 2022-07-13 ENCOUNTER — APPOINTMENT (OUTPATIENT)
Dept: CARDIOLOGY | Facility: CLINIC | Age: 52
End: 2022-07-13

## 2022-07-13 ENCOUNTER — APPOINTMENT (OUTPATIENT)
Dept: MRI IMAGING | Facility: CLINIC | Age: 52
End: 2022-07-13

## 2022-07-13 VITALS
WEIGHT: 170 LBS | SYSTOLIC BLOOD PRESSURE: 115 MMHG | DIASTOLIC BLOOD PRESSURE: 81 MMHG | OXYGEN SATURATION: 100 % | BODY MASS INDEX: 29.02 KG/M2 | HEART RATE: 79 BPM | HEIGHT: 64 IN

## 2022-07-13 DIAGNOSIS — R00.2 PALPITATIONS: ICD-10-CM

## 2022-07-13 PROCEDURE — 93000 ELECTROCARDIOGRAM COMPLETE: CPT

## 2022-07-13 PROCEDURE — 99215 OFFICE O/P EST HI 40 MIN: CPT | Mod: 25

## 2022-07-13 PROCEDURE — 73221 MRI JOINT UPR EXTREM W/O DYE: CPT | Mod: LT

## 2022-07-13 RX ORDER — ROSUVASTATIN CALCIUM 10 MG/1
10 TABLET, FILM COATED ORAL DAILY
Qty: 90 | Refills: 3 | Status: DISCONTINUED | COMMUNITY
Start: 1900-01-01 | End: 2022-07-13

## 2022-07-14 ENCOUNTER — NON-APPOINTMENT (OUTPATIENT)
Age: 52
End: 2022-07-14

## 2022-07-14 LAB
ALBUMIN SERPL ELPH-MCNC: 4.6 G/DL
ALP BLD-CCNC: 63 U/L
ALT SERPL-CCNC: 18 U/L
ANION GAP SERPL CALC-SCNC: 12 MMOL/L
AST SERPL-CCNC: 16 U/L
BASOPHILS # BLD AUTO: 0.06 K/UL
BASOPHILS NFR BLD AUTO: 1.2 %
BILIRUB SERPL-MCNC: 0.2 MG/DL
BUN SERPL-MCNC: 16 MG/DL
CALCIUM SERPL-MCNC: 9.5 MG/DL
CHLORIDE SERPL-SCNC: 103 MMOL/L
CHOLEST SERPL-MCNC: 196 MG/DL
CO2 SERPL-SCNC: 27 MMOL/L
CREAT SERPL-MCNC: 0.71 MG/DL
EGFR: 101 ML/MIN/1.73M2
EOSINOPHIL # BLD AUTO: 0.16 K/UL
EOSINOPHIL NFR BLD AUTO: 3.2 %
ESTIMATED AVERAGE GLUCOSE: 117 MG/DL
GLUCOSE SERPL-MCNC: 90 MG/DL
HBA1C MFR BLD HPLC: 5.7 %
HCT VFR BLD CALC: 39.2 %
HDLC SERPL-MCNC: 63 MG/DL
HGB BLD-MCNC: 12.7 G/DL
IMM GRANULOCYTES NFR BLD AUTO: 0.4 %
LDLC SERPL CALC-MCNC: 105 MG/DL
LYMPHOCYTES # BLD AUTO: 1.8 K/UL
LYMPHOCYTES NFR BLD AUTO: 35.9 %
MAN DIFF?: NORMAL
MCHC RBC-ENTMCNC: 28.7 PG
MCHC RBC-ENTMCNC: 32.4 GM/DL
MCV RBC AUTO: 88.7 FL
MONOCYTES # BLD AUTO: 0.48 K/UL
MONOCYTES NFR BLD AUTO: 9.6 %
NEUTROPHILS # BLD AUTO: 2.49 K/UL
NEUTROPHILS NFR BLD AUTO: 49.7 %
NONHDLC SERPL-MCNC: 132 MG/DL
PLATELET # BLD AUTO: 324 K/UL
POTASSIUM SERPL-SCNC: 4.6 MMOL/L
PROT SERPL-MCNC: 7.3 G/DL
RBC # BLD: 4.42 M/UL
RBC # FLD: 13.2 %
SODIUM SERPL-SCNC: 142 MMOL/L
TRIGL SERPL-MCNC: 139 MG/DL
TSH SERPL-ACNC: 2.6 UIU/ML
WBC # FLD AUTO: 5.01 K/UL

## 2022-07-15 ENCOUNTER — TRANSCRIPTION ENCOUNTER (OUTPATIENT)
Age: 52
End: 2022-07-15

## 2022-07-15 ENCOUNTER — APPOINTMENT (OUTPATIENT)
Dept: CARDIOLOGY | Facility: CLINIC | Age: 52
End: 2022-07-15

## 2022-07-15 DIAGNOSIS — R11.2 NAUSEA WITH VOMITING, UNSPECIFIED: ICD-10-CM

## 2022-07-15 PROCEDURE — 99215 OFFICE O/P EST HI 40 MIN: CPT | Mod: 95

## 2022-07-17 ENCOUNTER — APPOINTMENT (OUTPATIENT)
Dept: DISASTER EMERGENCY | Facility: HOSPITAL | Age: 52
End: 2022-07-17

## 2022-07-19 ENCOUNTER — APPOINTMENT (OUTPATIENT)
Dept: ORTHOPEDIC SURGERY | Facility: CLINIC | Age: 52
End: 2022-07-19

## 2022-07-26 ENCOUNTER — RX RENEWAL (OUTPATIENT)
Age: 52
End: 2022-07-26

## 2022-07-29 ENCOUNTER — APPOINTMENT (OUTPATIENT)
Dept: ORTHOPEDIC SURGERY | Facility: CLINIC | Age: 52
End: 2022-07-29

## 2022-07-29 VITALS — WEIGHT: 170 LBS | BODY MASS INDEX: 29.02 KG/M2 | HEIGHT: 64 IN

## 2022-07-29 PROCEDURE — 72040 X-RAY EXAM NECK SPINE 2-3 VW: CPT

## 2022-07-29 PROCEDURE — 99024 POSTOP FOLLOW-UP VISIT: CPT

## 2022-07-29 NOTE — HISTORY OF PRESENT ILLNESS
[Neck] : neck [Gradual] : gradual [5] : 5 [Dull/Aching] : dull/aching [Constant] : constant [de-identified] : 7/28/21: Here for f/u. Plan at last "Will eventually seek surgery for left shoulder. As far as neck has somewhat mild degenerative changes - no specific treatment indicated for this - suspect the shoulder the main issue based on current presentation of symptoms." She states she was initially doing well after surgery, but has since gotten increased pain after the first couple of sessions of PT. She states she has slowly improved, but the neck has been more bothersome lately. \par \par Had surgery with Dr Felder and was referred here for her neck \par \par had an episode with steroids which weren't helped\par \par 8/11/21: Here for MRI review. Plan at last "recently had left shoulder surgery and has pain in the left shoulder - referred for updated neck eval - indicated for updated MRI of the C spine for eval" - pain in the neck and to the left shoulder \par \par MRI C-spine 8/4/21:\par 1. C3-4: Left foraminal disc herniation causing moderate left foraminal stenosis. Dorsal annular fissure. No change.\par 2. C4-5: Broad-based central disc herniation impinging the ventral spinal cord. Dorsal annular fissure. Disc space narrowing. No change.\par 3. C6-7: Disc bulge abuts the ventral spinal cord. The bulge narrows the neuroforamina. Disc space narrowing and chronic vertebral endplate changes. No change.\par 4. C7-T1: Central disc herniation indents the thecal sac. No change.\par \par 3/12/22: plan last visit - "rec PT - reviewed the updated MRi - no change - lidocaine patch - no good surgical target" - here back for the neck - has had injections with Dr Sanches and has seen neurologic - no relief - neck pain and into the left shoulder and into the left arm remains - it is not responding to any treatment - pain limits her activity \par \par saw neurology and had further tests done and was told everything was coming from the neck\par \par 5/25/22: Here for follow-up; plan last visit was, " reviewed the case with her - persistent pain in the neck and the left side of the shoulder and into the left arm - reviewed tHE mri AND THERE IS neurologic narrowing in a correlating fashion - has tried extensive conservative tx and seen multiple providers who have been unable to provide relief and also have indicated that they feel her neck is the source of her pain - in light of all this she is indicated for. acdf C3-6. discussion of the surgery - the r/b/e discussed at length. she is well informed and would like to proceed\par \par here for pre op \par \par neck pain and into the left shoulder remains \par lwoer back pain and into the rigth thight \par has new mris which we reviewed today of the C and L spine \par \par MRi C spine - left sided NF stenosis C3-6\par MRI L spine - mild stenosis L3-4 and L4-5 \par \par 6/21/22: Here for fu post op - wound has been dry - has been suffering with constipation/nausua - not really able to take pain medications due to the side effects - wearing collar - swallowing slowly improving\par \par xrays today:\par C spine - implants in good position\par \par 7/29/22- Here for fu post op. 8 weeks s/p  acdf C3-6 performed 06/02/22. having some neck pain - swallowing better \par \par xrays today:\par C spine - healing well  [] : no

## 2022-07-29 NOTE — DISCUSSION/SUMMARY
[de-identified] : post op care discussed \par bone stim\par fu 4 weeks \par no heavy lifting\par OOW at this point  \par

## 2022-08-02 DIAGNOSIS — U07.1 COVID-19: ICD-10-CM

## 2022-08-08 ENCOUNTER — APPOINTMENT (OUTPATIENT)
Dept: CV DIAGNOSTICS | Facility: HOSPITAL | Age: 52
End: 2022-08-08

## 2022-08-08 ENCOUNTER — OUTPATIENT (OUTPATIENT)
Dept: OUTPATIENT SERVICES | Facility: HOSPITAL | Age: 52
LOS: 1 days | End: 2022-08-08
Payer: COMMERCIAL

## 2022-08-08 ENCOUNTER — APPOINTMENT (OUTPATIENT)
Dept: CARDIOLOGY | Facility: CLINIC | Age: 52
End: 2022-08-08

## 2022-08-08 DIAGNOSIS — Z98.890 OTHER SPECIFIED POSTPROCEDURAL STATES: Chronic | ICD-10-CM

## 2022-08-08 DIAGNOSIS — Z98.49 CATARACT EXTRACTION STATUS, UNSPECIFIED EYE: Chronic | ICD-10-CM

## 2022-08-08 DIAGNOSIS — Z86.018 PERSONAL HISTORY OF OTHER BENIGN NEOPLASM: Chronic | ICD-10-CM

## 2022-08-08 DIAGNOSIS — Z98.51 TUBAL LIGATION STATUS: Chronic | ICD-10-CM

## 2022-08-08 DIAGNOSIS — R07.9 CHEST PAIN, UNSPECIFIED: ICD-10-CM

## 2022-08-08 DIAGNOSIS — Z90.49 ACQUIRED ABSENCE OF OTHER SPECIFIED PARTS OF DIGESTIVE TRACT: Chronic | ICD-10-CM

## 2022-08-08 PROCEDURE — 93017 CV STRESS TEST TRACING ONLY: CPT

## 2022-08-08 PROCEDURE — 93016 CV STRESS TEST SUPVJ ONLY: CPT

## 2022-08-08 PROCEDURE — 99215 OFFICE O/P EST HI 40 MIN: CPT | Mod: 95

## 2022-08-08 PROCEDURE — 93018 CV STRESS TEST I&R ONLY: CPT

## 2022-08-22 ENCOUNTER — APPOINTMENT (OUTPATIENT)
Dept: ORTHOPEDIC SURGERY | Facility: CLINIC | Age: 52
End: 2022-08-22

## 2022-08-22 VITALS — HEIGHT: 64 IN | WEIGHT: 170 LBS | BODY MASS INDEX: 29.02 KG/M2

## 2022-08-22 PROCEDURE — 72040 X-RAY EXAM NECK SPINE 2-3 VW: CPT

## 2022-08-22 PROCEDURE — 99024 POSTOP FOLLOW-UP VISIT: CPT

## 2022-08-22 NOTE — HISTORY OF PRESENT ILLNESS
[Neck] : neck [Gradual] : gradual [5] : 5 [Dull/Aching] : dull/aching [Constant] : constant [de-identified] : 7/28/21: Here for f/u. Plan at last "Will eventually seek surgery for left shoulder. As far as neck has somewhat mild degenerative changes - no specific treatment indicated for this - suspect the shoulder the main issue based on current presentation of symptoms." She states she was initially doing well after surgery, but has since gotten increased pain after the first couple of sessions of PT. She states she has slowly improved, but the neck has been more bothersome lately. \par \par Had surgery with Dr Felder and was referred here for her neck \par \par had an episode with steroids which weren't helped\par \par 8/11/21: Here for MRI review. Plan at last "recently had left shoulder surgery and has pain in the left shoulder - referred for updated neck eval - indicated for updated MRI of the C spine for eval" - pain in the neck and to the left shoulder \par \par MRI C-spine 8/4/21:\par 1. C3-4: Left foraminal disc herniation causing moderate left foraminal stenosis. Dorsal annular fissure. No change.\par 2. C4-5: Broad-based central disc herniation impinging the ventral spinal cord. Dorsal annular fissure. Disc space narrowing. No change.\par 3. C6-7: Disc bulge abuts the ventral spinal cord. The bulge narrows the neuroforamina. Disc space narrowing and chronic vertebral endplate changes. No change.\par 4. C7-T1: Central disc herniation indents the thecal sac. No change.\par \par 3/12/22: plan last visit - "rec PT - reviewed the updated MRi - no change - lidocaine patch - no good surgical target" - here back for the neck - has had injections with Dr Sanches and has seen neurologic - no relief - neck pain and into the left shoulder and into the left arm remains - it is not responding to any treatment - pain limits her activity \par \par saw neurology and had further tests done and was told everything was coming from the neck\par \par 5/25/22: Here for follow-up; plan last visit was, " reviewed the case with her - persistent pain in the neck and the left side of the shoulder and into the left arm - reviewed tHE mri AND THERE IS neurologic narrowing in a correlating fashion - has tried extensive conservative tx and seen multiple providers who have been unable to provide relief and also have indicated that they feel her neck is the source of her pain - in light of all this she is indicated for. acdf C3-6. discussion of the surgery - the r/b/e discussed at length. she is well informed and would like to proceed\par \par here for pre op \par \par neck pain and into the left shoulder remains \par lwoer back pain and into the rigth thight \par has new mris which we reviewed today of the C and L spine \par \par MRi C spine - left sided NF stenosis C3-6\par MRI L spine - mild stenosis L3-4 and L4-5 \par \par 6/21/22: Here for fu post op - wound has been dry - has been suffering with constipation/nausua - not really able to take pain medications due to the side effects - wearing collar - swallowing slowly improving\par \par xrays today:\par C spine - implants in good position\par \par 7/29/22- Here for fu post op. 8 weeks s/p  acdf C3-6 performed 06/02/22. having some neck pain - swallowing better \par \par xrays today:\par C spine - healing well \par \par 8/22/22: Here for fu - plan at last was "post op care discussed \par bone stim\par fu 4 weeks \par no heavy lifting\par OOW at this point" - overall doing a bit better - pain in the left lateral upper arm \par \par using advil and celebrex at times \par bone stim wasn’t approved \par \par xrays today: \par C spine - healing acdf  [] : no

## 2022-08-25 ENCOUNTER — APPOINTMENT (OUTPATIENT)
Dept: CARDIOLOGY | Facility: CLINIC | Age: 52
End: 2022-08-25

## 2022-08-25 VITALS
WEIGHT: 158 LBS | OXYGEN SATURATION: 100 % | SYSTOLIC BLOOD PRESSURE: 110 MMHG | HEART RATE: 83 BPM | BODY MASS INDEX: 27.12 KG/M2 | DIASTOLIC BLOOD PRESSURE: 70 MMHG

## 2022-08-25 PROCEDURE — 99215 OFFICE O/P EST HI 40 MIN: CPT

## 2022-09-06 ENCOUNTER — TRANSCRIPTION ENCOUNTER (OUTPATIENT)
Age: 52
End: 2022-09-06

## 2022-09-09 ENCOUNTER — NON-APPOINTMENT (OUTPATIENT)
Age: 52
End: 2022-09-09

## 2022-09-12 ENCOUNTER — APPOINTMENT (OUTPATIENT)
Dept: CARDIOLOGY | Facility: CLINIC | Age: 52
End: 2022-09-12

## 2022-09-12 PROCEDURE — 99215 OFFICE O/P EST HI 40 MIN: CPT | Mod: 95

## 2022-09-26 ENCOUNTER — TRANSCRIPTION ENCOUNTER (OUTPATIENT)
Age: 52
End: 2022-09-26

## 2022-10-03 ENCOUNTER — APPOINTMENT (OUTPATIENT)
Dept: ORTHOPEDIC SURGERY | Facility: CLINIC | Age: 52
End: 2022-10-03

## 2022-10-03 PROCEDURE — 72050 X-RAY EXAM NECK SPINE 4/5VWS: CPT

## 2022-10-03 PROCEDURE — 99214 OFFICE O/P EST MOD 30 MIN: CPT

## 2022-10-03 NOTE — HISTORY OF PRESENT ILLNESS
[Neck] : neck [Gradual] : gradual [5] : 5 [Dull/Aching] : dull/aching [Constant] : constant [de-identified] : 7/28/21: Here for f/u. Plan at last "Will eventually seek surgery for left shoulder. As far as neck has somewhat mild degenerative changes - no specific treatment indicated for this - suspect the shoulder the main issue based on current presentation of symptoms." She states she was initially doing well after surgery, but has since gotten increased pain after the first couple of sessions of PT. She states she has slowly improved, but the neck has been more bothersome lately. \par \par Had surgery with Dr Felder and was referred here for her neck \par \par had an episode with steroids which weren't helped\par \par 8/11/21: Here for MRI review. Plan at last "recently had left shoulder surgery and has pain in the left shoulder - referred for updated neck eval - indicated for updated MRI of the C spine for eval" - pain in the neck and to the left shoulder \par \par MRI C-spine 8/4/21:\par 1. C3-4: Left foraminal disc herniation causing moderate left foraminal stenosis. Dorsal annular fissure. No change.\par 2. C4-5: Broad-based central disc herniation impinging the ventral spinal cord. Dorsal annular fissure. Disc space narrowing. No change.\par 3. C6-7: Disc bulge abuts the ventral spinal cord. The bulge narrows the neuroforamina. Disc space narrowing and chronic vertebral endplate changes. No change.\par 4. C7-T1: Central disc herniation indents the thecal sac. No change.\par \par 3/12/22: plan last visit - "rec PT - reviewed the updated MRi - no change - lidocaine patch - no good surgical target" - here back for the neck - has had injections with Dr Sanches and has seen neurologic - no relief - neck pain and into the left shoulder and into the left arm remains - it is not responding to any treatment - pain limits her activity \par \par saw neurology and had further tests done and was told everything was coming from the neck\par \par 5/25/22: Here for follow-up; plan last visit was, " reviewed the case with her - persistent pain in the neck and the left side of the shoulder and into the left arm - reviewed tHE mri AND THERE IS neurologic narrowing in a correlating fashion - has tried extensive conservative tx and seen multiple providers who have been unable to provide relief and also have indicated that they feel her neck is the source of her pain - in light of all this she is indicated for. acdf C3-6. discussion of the surgery - the r/b/e discussed at length. she is well informed and would like to proceed\par \par here for pre op \par \par neck pain and into the left shoulder remains \par lwoer back pain and into the rigth thight \par has new mris which we reviewed today of the C and L spine \par \par MRi C spine - left sided NF stenosis C3-6\par MRI L spine - mild stenosis L3-4 and L4-5 \par \par 6/21/22: Here for fu post op - wound has been dry - has been suffering with constipation/nausua - not really able to take pain medications due to the side effects - wearing collar - swallowing slowly improving\par \par xrays today:\par C spine - implants in good position\par \par 7/29/22- Here for fu post op. 8 weeks s/p  acdf C3-6 performed 06/02/22. having some neck pain - swallowing better \par \par xrays today:\par C spine - healing well \par \par 8/22/22: Here for fu - plan at last was "post op care discussed \par bone stim\par fu 4 weeks \par no heavy lifting\par OOW at this point" - overall doing a bit better - pain in the left lateral upper arm \par \par using advil and celebrex at times \par bone stim wasn’t approved \par \par xrays today: \par C spine - healing acdf \par \par 10/3/22: Here for fu - plan at last was "post op care discussed \par fu 4-6weeks \par rtw 8/22/22" - overall having pain in the back of the neck \par \par xrays today:\par C spine - healed acdf  [] : no

## 2022-10-03 NOTE — DISCUSSION/SUMMARY
[de-identified] : neck pain remains  \par fu 8 weeks \par ibuprofen/flexeril \par cont with light duty\par \par okay to have breast reduction from a spine stand point    \par

## 2022-10-12 ENCOUNTER — APPOINTMENT (OUTPATIENT)
Dept: CARDIOLOGY | Facility: CLINIC | Age: 52
End: 2022-10-12

## 2022-10-12 PROCEDURE — 99215 OFFICE O/P EST HI 40 MIN: CPT | Mod: 95

## 2022-10-12 RX ORDER — SEMAGLUTIDE 1.34 MG/ML
4 INJECTION, SOLUTION SUBCUTANEOUS
Qty: 2 | Refills: 3 | Status: DISCONTINUED | COMMUNITY
Start: 2022-09-12 | End: 2022-10-12

## 2022-10-12 RX ORDER — SEMAGLUTIDE 1.34 MG/ML
2 INJECTION, SOLUTION SUBCUTANEOUS
Qty: 4 | Refills: 5 | Status: DISCONTINUED | COMMUNITY
Start: 2022-07-13 | End: 2022-10-12

## 2022-11-07 ENCOUNTER — APPOINTMENT (OUTPATIENT)
Dept: CARDIOLOGY | Facility: CLINIC | Age: 52
End: 2022-11-07

## 2022-11-07 VITALS — BODY MASS INDEX: 25.23 KG/M2 | WEIGHT: 147 LBS

## 2022-11-07 PROCEDURE — 99215 OFFICE O/P EST HI 40 MIN: CPT | Mod: 95

## 2022-11-22 ENCOUNTER — APPOINTMENT (OUTPATIENT)
Dept: CARDIOLOGY | Facility: CLINIC | Age: 52
End: 2022-11-22

## 2022-12-06 ENCOUNTER — APPOINTMENT (OUTPATIENT)
Dept: CARDIOLOGY | Facility: CLINIC | Age: 52
End: 2022-12-06

## 2022-12-06 PROCEDURE — 99215 OFFICE O/P EST HI 40 MIN: CPT | Mod: 95

## 2022-12-08 ENCOUNTER — APPOINTMENT (OUTPATIENT)
Dept: CARDIOLOGY | Facility: CLINIC | Age: 52
End: 2022-12-08

## 2022-12-08 VITALS
BODY MASS INDEX: 23.9 KG/M2 | WEIGHT: 140 LBS | OXYGEN SATURATION: 100 % | DIASTOLIC BLOOD PRESSURE: 70 MMHG | HEART RATE: 72 BPM | SYSTOLIC BLOOD PRESSURE: 120 MMHG | HEIGHT: 64 IN

## 2022-12-08 PROCEDURE — 93224 XTRNL ECG REC UP TO 48 HRS: CPT

## 2022-12-08 PROCEDURE — 99215 OFFICE O/P EST HI 40 MIN: CPT

## 2022-12-09 LAB
ALBUMIN SERPL ELPH-MCNC: 4.8 G/DL
ALP BLD-CCNC: 62 U/L
ALT SERPL-CCNC: 27 U/L
ANION GAP SERPL CALC-SCNC: 10 MMOL/L
AST SERPL-CCNC: 21 U/L
BASOPHILS # BLD AUTO: 0.07 K/UL
BASOPHILS NFR BLD AUTO: 1 %
BILIRUB SERPL-MCNC: 0.4 MG/DL
BUN SERPL-MCNC: 21 MG/DL
CALCIUM SERPL-MCNC: 10.1 MG/DL
CHLORIDE SERPL-SCNC: 102 MMOL/L
CHOLEST SERPL-MCNC: 201 MG/DL
CO2 SERPL-SCNC: 28 MMOL/L
CREAT SERPL-MCNC: 0.63 MG/DL
EGFR: 105 ML/MIN/1.73M2
EOSINOPHIL # BLD AUTO: 0.14 K/UL
EOSINOPHIL NFR BLD AUTO: 2 %
ESTIMATED AVERAGE GLUCOSE: 108 MG/DL
GLUCOSE SERPL-MCNC: 83 MG/DL
HBA1C MFR BLD HPLC: 5.4 %
HCT VFR BLD CALC: 43.1 %
HDLC SERPL-MCNC: 76 MG/DL
HGB BLD-MCNC: 14 G/DL
IMM GRANULOCYTES NFR BLD AUTO: 0.3 %
LDLC SERPL CALC-MCNC: 105 MG/DL
LYMPHOCYTES # BLD AUTO: 2.11 K/UL
LYMPHOCYTES NFR BLD AUTO: 29.4 %
MAN DIFF?: NORMAL
MCHC RBC-ENTMCNC: 30.3 PG
MCHC RBC-ENTMCNC: 32.5 GM/DL
MCV RBC AUTO: 93.3 FL
MONOCYTES # BLD AUTO: 0.51 K/UL
MONOCYTES NFR BLD AUTO: 7.1 %
NEUTROPHILS # BLD AUTO: 4.32 K/UL
NEUTROPHILS NFR BLD AUTO: 60.2 %
NONHDLC SERPL-MCNC: 125 MG/DL
PLATELET # BLD AUTO: 307 K/UL
POTASSIUM SERPL-SCNC: 4.7 MMOL/L
PROT SERPL-MCNC: 7 G/DL
RBC # BLD: 4.62 M/UL
RBC # FLD: 12.9 %
SODIUM SERPL-SCNC: 139 MMOL/L
TRIGL SERPL-MCNC: 97 MG/DL
TSH SERPL-ACNC: 1.37 UIU/ML
WBC # FLD AUTO: 7.17 K/UL

## 2022-12-13 ENCOUNTER — APPOINTMENT (OUTPATIENT)
Dept: OPHTHALMOLOGY | Facility: CLINIC | Age: 52
End: 2022-12-13
Payer: COMMERCIAL

## 2022-12-13 ENCOUNTER — NON-APPOINTMENT (OUTPATIENT)
Age: 52
End: 2022-12-13

## 2022-12-13 PROCEDURE — 92133 CPTRZD OPH DX IMG PST SGM ON: CPT

## 2022-12-13 PROCEDURE — 92004 COMPRE OPH EXAM NEW PT 1/>: CPT

## 2022-12-19 ENCOUNTER — APPOINTMENT (OUTPATIENT)
Dept: ORTHOPEDIC SURGERY | Facility: CLINIC | Age: 52
End: 2022-12-19

## 2022-12-19 VITALS — WEIGHT: 140 LBS | BODY MASS INDEX: 23.9 KG/M2 | HEIGHT: 64 IN

## 2022-12-19 PROCEDURE — 99214 OFFICE O/P EST MOD 30 MIN: CPT

## 2022-12-19 PROCEDURE — 72040 X-RAY EXAM NECK SPINE 2-3 VW: CPT

## 2022-12-19 NOTE — HISTORY OF PRESENT ILLNESS
[Neck] : neck [Gradual] : gradual [Dull/Aching] : dull/aching [Constant] : constant [7] : 7 [Localized] : localized [Household chores] : household chores [Leisure] : leisure [Sleep] : sleep [Meds] : meds [Heat] : heat [Lying in bed] : lying in bed [Part time] : Work status: part time [de-identified] : 7/28/21: Here for f/u. Plan at last "Will eventually seek surgery for left shoulder. As far as neck has somewhat mild degenerative changes - no specific treatment indicated for this - suspect the shoulder the main issue based on current presentation of symptoms." She states she was initially doing well after surgery, but has since gotten increased pain after the first couple of sessions of PT. She states she has slowly improved, but the neck has been more bothersome lately. \par \par Had surgery with Dr Felder and was referred here for her neck \par \par had an episode with steroids which weren't helped\par \par 8/11/21: Here for MRI review. Plan at last "recently had left shoulder surgery and has pain in the left shoulder - referred for updated neck eval - indicated for updated MRI of the C spine for eval" - pain in the neck and to the left shoulder \par \par MRI C-spine 8/4/21:\par 1. C3-4: Left foraminal disc herniation causing moderate left foraminal stenosis. Dorsal annular fissure. No change.\par 2. C4-5: Broad-based central disc herniation impinging the ventral spinal cord. Dorsal annular fissure. Disc space narrowing. No change.\par 3. C6-7: Disc bulge abuts the ventral spinal cord. The bulge narrows the neuroforamina. Disc space narrowing and chronic vertebral endplate changes. No change.\par 4. C7-T1: Central disc herniation indents the thecal sac. No change.\par \par 3/12/22: plan last visit - "rec PT - reviewed the updated MRi - no change - lidocaine patch - no good surgical target" - here back for the neck - has had injections with Dr Sanches and has seen neurologic - no relief - neck pain and into the left shoulder and into the left arm remains - it is not responding to any treatment - pain limits her activity \par \par saw neurology and had further tests done and was told everything was coming from the neck\par \par 5/25/22: Here for follow-up; plan last visit was, " reviewed the case with her - persistent pain in the neck and the left side of the shoulder and into the left arm - reviewed tHE mri AND THERE IS neurologic narrowing in a correlating fashion - has tried extensive conservative tx and seen multiple providers who have been unable to provide relief and also have indicated that they feel her neck is the source of her pain - in light of all this she is indicated for. acdf C3-6. discussion of the surgery - the r/b/e discussed at length. she is well informed and would like to proceed\par \par here for pre op \par \par neck pain and into the left shoulder remains \par lwoer back pain and into the rigth thight \par has new mris which we reviewed today of the C and L spine \par \par MRi C spine - left sided NF stenosis C3-6\par MRI L spine - mild stenosis L3-4 and L4-5 \par \par 6/21/22: Here for fu post op - wound has been dry - has been suffering with constipation/nausua - not really able to take pain medications due to the side effects - wearing collar - swallowing slowly improving\par \par xrays today:\par C spine - implants in good position\par \par 7/29/22- Here for fu post op. 8 weeks s/p  acdf C3-6 performed 06/02/22. having some neck pain - swallowing better \par \par xrays today:\par C spine - healing well \par \par 8/22/22: Here for fu - plan at last was "post op care discussed \par bone stim\par fu 4 weeks \par no heavy lifting\par OOW at this point" - overall doing a bit better - pain in the left lateral upper arm \par \par using advil and celebrex at times \par bone stim wasn’t approved \par \par xrays today: \par C spine - healing acdf \par \par 12/19/22: Here for - now about 6 months s/p acdf - having more pain in the lower part of the neck - she cant really it go away with certain positions - sides of the neck painful \par \par working \par \par xrays today:\par C spine - healed up  [] : no [FreeTextEntry5] : Patient is here for follow up of neck.  [FreeTextEntry9] : 800 mg Motrin [de-identified] : turning neck

## 2022-12-19 NOTE — DISCUSSION/SUMMARY
[de-identified] : post op care discussed \par working \par has some celebrex \par MRI C spine indicated for worsening pain

## 2022-12-20 ENCOUNTER — FORM ENCOUNTER (OUTPATIENT)
Age: 52
End: 2022-12-20

## 2022-12-21 ENCOUNTER — APPOINTMENT (OUTPATIENT)
Dept: MRI IMAGING | Facility: CLINIC | Age: 52
End: 2022-12-21

## 2022-12-21 PROCEDURE — 72141 MRI NECK SPINE W/O DYE: CPT

## 2022-12-26 ENCOUNTER — APPOINTMENT (OUTPATIENT)
Dept: ORTHOPEDIC SURGERY | Facility: CLINIC | Age: 52
End: 2022-12-26

## 2022-12-26 VITALS — WEIGHT: 140 LBS | BODY MASS INDEX: 23.9 KG/M2 | HEIGHT: 64 IN

## 2022-12-26 DIAGNOSIS — Z98.1 ARTHRODESIS STATUS: ICD-10-CM

## 2022-12-26 PROCEDURE — 99214 OFFICE O/P EST MOD 30 MIN: CPT

## 2022-12-26 NOTE — DISCUSSION/SUMMARY
[de-identified] : reviewed the MRi of the C spine \par discussion of options \par has degnerative disc issues below the fusion and pain below the fusion - discussion that could extend the fusion down but would ideally prior to doing that as it would result in a 4 level fusion \par rec conversative tx in the meantime \par fu 3 months

## 2022-12-26 NOTE — HISTORY OF PRESENT ILLNESS
[Neck] : neck [Gradual] : gradual [7] : 7 [Dull/Aching] : dull/aching [Localized] : localized [Constant] : constant [Household chores] : household chores [Leisure] : leisure [Sleep] : sleep [Meds] : meds [Heat] : heat [Lying in bed] : lying in bed [Part time] : Work status: part time [de-identified] : 7/28/21: Here for f/u. Plan at last "Will eventually seek surgery for left shoulder. As far as neck has somewhat mild degenerative changes - no specific treatment indicated for this - suspect the shoulder the main issue based on current presentation of symptoms." She states she was initially doing well after surgery, but has since gotten increased pain after the first couple of sessions of PT. She states she has slowly improved, but the neck has been more bothersome lately. \par \par Had surgery with Dr Felder and was referred here for her neck \par \par had an episode with steroids which weren't helped\par \par 8/11/21: Here for MRI review. Plan at last "recently had left shoulder surgery and has pain in the left shoulder - referred for updated neck eval - indicated for updated MRI of the C spine for eval" - pain in the neck and to the left shoulder \par \par MRI C-spine 8/4/21:\par 1. C3-4: Left foraminal disc herniation causing moderate left foraminal stenosis. Dorsal annular fissure. No change.\par 2. C4-5: Broad-based central disc herniation impinging the ventral spinal cord. Dorsal annular fissure. Disc space narrowing. No change.\par 3. C6-7: Disc bulge abuts the ventral spinal cord. The bulge narrows the neuroforamina. Disc space narrowing and chronic vertebral endplate changes. No change.\par 4. C7-T1: Central disc herniation indents the thecal sac. No change.\par \par 3/12/22: plan last visit - "rec PT - reviewed the updated MRi - no change - lidocaine patch - no good surgical target" - here back for the neck - has had injections with Dr Sanches and has seen neurologic - no relief - neck pain and into the left shoulder and into the left arm remains - it is not responding to any treatment - pain limits her activity \par \par saw neurology and had further tests done and was told everything was coming from the neck\par \par 5/25/22: Here for follow-up; plan last visit was, " reviewed the case with her - persistent pain in the neck and the left side of the shoulder and into the left arm - reviewed tHE mri AND THERE IS neurologic narrowing in a correlating fashion - has tried extensive conservative tx and seen multiple providers who have been unable to provide relief and also have indicated that they feel her neck is the source of her pain - in light of all this she is indicated for. acdf C3-6. discussion of the surgery - the r/b/e discussed at length. she is well informed and would like to proceed\par \par here for pre op \par \par neck pain and into the left shoulder remains \par lwoer back pain and into the rigth thight \par has new mris which we reviewed today of the C and L spine \par \par MRi C spine - left sided NF stenosis C3-6\par MRI L spine - mild stenosis L3-4 and L4-5 \par \par 6/21/22: Here for fu post op - wound has been dry - has been suffering with constipation/nausua - not really able to take pain medications due to the side effects - wearing collar - swallowing slowly improving\par \par xrays today:\par C spine - implants in good position\par \par 7/29/22- Here for fu post op. 8 weeks s/p  acdf C3-6 performed 06/02/22. having some neck pain - swallowing better \par \par xrays today:\par C spine - healing well \par \par 8/22/22: Here for fu - plan at last was "post op care discussed \par bone stim\par fu 4 weeks \par no heavy lifting\par OOW at this point" - overall doing a bit better - pain in the left lateral upper arm \par \par using advil and celebrex at times \par bone stim wasn’t approved \par \par xrays today: \par C spine - healing acdf \par \par 12/19/22: Here for - now about 6 months s/p acdf - having more pain in the lower part of the neck - she cant really it go away with certain positions - sides of the neck painful \par \par working \par \par xrays today:\par C spine - healed up \par \par 12/26/22: here for fu - plan at last was "post op care discussed \par working \par has some celebrex \par MRI C spine indicated for worsening pain" - overall doing about the same as last visit \par \par mri C spine - Postoperative study following C3-4 and C4-5 discectomy and fusion without spinal cord or nerve root \par compression at the operated level.\par Small disc herniation at C2-3 without spinal cord or nerve root compression unchanged from the most recent \par prior MRI.\par Mild degenerative disc disease at C5-6 without progression from the prior MRI.\par Moderate degenerative disc disease with a disc herniation and small endplate osteophytes at C6-7 without spinal \par cord compression, nerve root compression or change from the most recent prior MRI.\par Small disc herniation at C7-T1 without spinal cord or nerve root compression or change from the most recent \par prior MRI.\par Mild left-sided facet osteoarthrosis at C3-4 and C4-5 without progression from the prior MRI. The bone marrow\par edema seen in the left articular process of C3 on the most recent prior MRI has resolved. [] : no [FreeTextEntry5] : Patient is here for follow up of neck.  [FreeTextEntry9] : 800 mg Motrin [de-identified] : turning neck

## 2022-12-27 ENCOUNTER — RX RENEWAL (OUTPATIENT)
Age: 52
End: 2022-12-27

## 2022-12-29 ENCOUNTER — FORM ENCOUNTER (OUTPATIENT)
Age: 52
End: 2022-12-29

## 2022-12-30 ENCOUNTER — APPOINTMENT (OUTPATIENT)
Dept: MRI IMAGING | Facility: CLINIC | Age: 52
End: 2022-12-30
Payer: COMMERCIAL

## 2022-12-30 ENCOUNTER — APPOINTMENT (OUTPATIENT)
Dept: ORTHOPEDIC SURGERY | Facility: CLINIC | Age: 52
End: 2022-12-30
Payer: COMMERCIAL

## 2022-12-30 VITALS — WEIGHT: 140 LBS | BODY MASS INDEX: 23.9 KG/M2 | HEIGHT: 64 IN

## 2022-12-30 DIAGNOSIS — M17.10 UNILATERAL PRIMARY OSTEOARTHRITIS, UNSPECIFIED KNEE: ICD-10-CM

## 2022-12-30 DIAGNOSIS — M25.561 PAIN IN RIGHT KNEE: ICD-10-CM

## 2022-12-30 PROCEDURE — 73562 X-RAY EXAM OF KNEE 3: CPT | Mod: LT

## 2022-12-30 PROCEDURE — 73721 MRI JNT OF LWR EXTRE W/O DYE: CPT | Mod: RT

## 2022-12-30 PROCEDURE — 99214 OFFICE O/P EST MOD 30 MIN: CPT | Mod: 25

## 2022-12-30 PROCEDURE — L1832: CPT | Mod: RT

## 2022-12-30 NOTE — IMAGING
[Right] : right knee [de-identified] : Mild right knee swelling\par Right hip with full and painfree ROM\par There is TTP over the medial jointline \par Anterior Drawer Test is negative \par Lachman Test is not performed due to guarding. \par Posterior Drawer Test is negative\par Pt has significantly limited motion of the right knee. \par Jean Test is positive medially.\par Apley Compression Test is not performed. \par Lower extremity is nvi.\par Gait is mildly antalgic to the right. \par Exam is mildly limited due to discomfort/guarding. \par  [FreeTextEntry9] : mild to moderate medial joint OA and PTF OA with no evidence of acute bony pathology.  Yes

## 2022-12-30 NOTE — HISTORY OF PRESENT ILLNESS
[10] : 10 [de-identified] : 12/30/2022: Pt here with one day of right knee pain. \par Pt states she noted pain with kneeling to standing and now cannot turn in different directions and she feels instability. \par \par PMH: Hypothyroid. Constipation, GERD.\par Allergies: Steroids, pcn, vicodin/codeine (itching).  [FreeTextEntry5] : pt c.o pain in rt knee for 2 weeks and got worse 2 days \par \par

## 2022-12-30 NOTE — ASSESSMENT
[FreeTextEntry1] : Pt provided right hinged knee brace for support.\par Pt declined CSI due to allergy to steroids.\par Continue Ibuprofen 800 mg qid prn pain.\par Referred for stat MRI to assess for acute MM tear.\par Pt will rto s/p MRI to discuss results/tx options with Dr. Hart.;

## 2022-12-31 ENCOUNTER — NON-APPOINTMENT (OUTPATIENT)
Age: 52
End: 2022-12-31

## 2022-12-31 ENCOUNTER — APPOINTMENT (OUTPATIENT)
Dept: MRI IMAGING | Facility: CLINIC | Age: 52
End: 2022-12-31

## 2023-01-04 ENCOUNTER — APPOINTMENT (OUTPATIENT)
Dept: ORTHOPEDIC SURGERY | Facility: CLINIC | Age: 53
End: 2023-01-04
Payer: COMMERCIAL

## 2023-01-04 VITALS — HEIGHT: 64 IN | BODY MASS INDEX: 23.73 KG/M2 | WEIGHT: 139 LBS

## 2023-01-04 PROCEDURE — J3490M: CUSTOM

## 2023-01-04 PROCEDURE — 99213 OFFICE O/P EST LOW 20 MIN: CPT | Mod: 25

## 2023-01-04 PROCEDURE — 20611 DRAIN/INJ JOINT/BURSA W/US: CPT

## 2023-01-04 NOTE — PHYSICAL EXAM
[Right] : right knee [NL (0)] : extension 0 degrees [] : lateral tibial plateau tenderness [TWNoteComboBox7] : flexion 125 degrees

## 2023-01-04 NOTE — DATA REVIEWED
[MRI] : MRI [Right] : of the right [Knee] : knee [Report was reviewed and noted in the chart] : The report was reviewed and noted in the chart [I reviewed the films/CD and agree] : I reviewed the films/CD and agree [FreeTextEntry1] : Impression: right knee\par 1. Complex tear and maceration of the anterior horn, body, and posterior horn of the lateral meniscus partially extending into the anterior and posterior root. The medial meniscus is intact.\par 2. Mild sprain of the ACL.\par 3. Severe lateral compartment joint narrowing with diffuse full-thickness cartilage loss and subchondral edema and marginal osteophytes. High-grade patellofemoral cartilage loss. Cartilage thinning at the medial \par compartment.\par 4. Moderate-sized joint effusion with synovitis. 5 cm popliteal cyst. Mild periarticular soft tissue edema.\par 5. Partial tear of the popliteal fibular ligament.\par Dictated by Mendez Hussein M.D. on 12/30/2022

## 2023-01-04 NOTE — HISTORY OF PRESENT ILLNESS
[Result of repetitive motion] : result of repetitive motion [10] : 10 [de-identified] : 1/4/22: Patient presents with persistent right knee pain. She has been wearing HKB and taking Motrin/Tylenol. She had MRI performed. \par \par Prior UC note:\par 12/30/2022: Pt here with one day of right knee pain. \par Pt states she noted pain with kneeling to standing and now cannot turn in different directions and she feels instability. \par \par PMH: Hypothyroid. Constipation, GERD.\par Allergies: Steroids, pcn, vicodin/codeine (itching).  [FreeTextEntry5] : pt c.o pain in rt knee for 2 weeks and got worse 2 days \par taking Motrin 800\par \par  [de-identified] : Motrin 800\par s/p MMT 6 years ago

## 2023-01-04 NOTE — ASSESSMENT
[FreeTextEntry1] : Underlying pathology reviewed and treatment options discussed. \par xrays right knee, 3 views,  reveals near 90% loss of lateral compartment narrowing with compartment changes.  Xrays from [facility] 12/30/22\par We reviewed how to progress management of OA with an allergy to steroids that disqualifys her from steroid injections or MDP pack.\par Activity modifier as tolerated. Questions addressed.\par Encouraged use of cane for 10 days. \par OOW until Jan 17th. If she feels more comfortable to RTW than she call call to update her work note. \par 01/04/2023: MRI reviewed and discussed.\par Obtain authorization for visco injections.\par RTO 4 wks. \par \par \par A Large joint aspiration/injection was performed of the [RIGHT KNEE]. The indication for this procedure was pain and inflammation, and patient had decreased mobility in the joint. Risks outlined include but are not limited to infection, sepsis, bleeding, scarring, skin discoloration, temporary increase in pain, syncopal episode, failure to resolve symptoms, allergic reaction, and symptom recurrence.. All questions were answered to the patient's apparent satisfaction and informed consent obtained. Prior to injection a 'Time Out' was conducted in accordance with University policy and the site and nature of procedure verified with the patient. \par  \par Procedure: The area of injection was prepared in a sterile fashion. The injection and aspiration was carried out utilizing sterile technique. The site was prepped with alcohol, betadine, ethyl chloride sprayed topically and sterile technique used.\par \par Needle placement under ultrasound guidance to improve accuracy and minimize risk to the patient and: \par  \par (X) Diagnositic ultrasound in the long and short axis revealed OA\par \par 7 cc of clear synovial fluid was aspirated. \par The specimen:\par (X) appeared benign and was discarded \par  \par ( X )  2cc of 0.5% Bupivacaine \par ( X ) 2cc of 1% Lidocaine \par \par Patient tolerated the procedure well and direct pressure was applied for hemostasis. The patient was reminded of potential post-injection risks including, but not limited to, delayed hypersensitivity reactions and/or infection. Ice tonight to the injection site.\par \par The documentation recorded by the scribe accurately reflects the service I personally performed and the decisions made by me.\par I, Arpit Alcocer, attest that this documentation has been prepared under the direction and in the presence of Provider Eric Hart MD.\par \par The patient was seen by Eric Hart MD.\par

## 2023-01-04 NOTE — IMAGING
[Right] : right knee [FreeTextEntry9] : mild to moderate medial joint OA and PTF OA with no evidence of acute bony pathology.

## 2023-01-06 ENCOUNTER — APPOINTMENT (OUTPATIENT)
Dept: OPHTHALMOLOGY | Facility: CLINIC | Age: 53
End: 2023-01-06

## 2023-01-11 ENCOUNTER — NON-APPOINTMENT (OUTPATIENT)
Age: 53
End: 2023-01-11

## 2023-01-25 ENCOUNTER — APPOINTMENT (OUTPATIENT)
Dept: ORTHOPEDIC SURGERY | Facility: CLINIC | Age: 53
End: 2023-01-25
Payer: COMMERCIAL

## 2023-01-25 DIAGNOSIS — S83.231D COMPLEX TEAR OF MEDIAL MENISCUS, CURRENT INJURY, RIGHT KNEE, SUBSEQUENT ENCOUNTER: ICD-10-CM

## 2023-01-25 DIAGNOSIS — S83.271D COMPLEX TEAR OF LATERAL MENISCUS, CURRENT INJURY, RIGHT KNEE, SUBSEQUENT ENCOUNTER: ICD-10-CM

## 2023-01-25 PROCEDURE — 99213 OFFICE O/P EST LOW 20 MIN: CPT

## 2023-01-25 NOTE — ASSESSMENT
[FreeTextEntry1] : Underlying pathology reviewed and treatment options discussed. \par xrays right knee, 3 views,  reveals near 90% loss of lateral compartment narrowing with compartment changes.  Xrays from [facility]  12/30/22\par We reviewed how to progress management of OA with an allergy to steroids that disqualifys her from steroid injections or MDP pack.\par Activity modifier as tolerated. Questions addressed.\par Encouraged use of cane for 10 days. \par OOW until Jan 17th. If she feels more comfortable to RTW than she call call to update her work note. \par 01/04/2023: MRI reviewed and discussed.\par Obtain authorization for visco injections.\par RTO 4 wks. \par \par 1/25/23: Treatment options discussed, including risks/benefits of visco injections vs. TKA.\par Patient would like to move forward with TKA due to level of pain.\par Will not start Euflexxa injections at this time.\par Recommend consult with Dr. Jacek NEWELL.\par \par Progress note completed by MARIN Balderas under the direct supervision of Eric Hart M.D.\par

## 2023-01-25 NOTE — HISTORY OF PRESENT ILLNESS
[Result of repetitive motion] : result of repetitive motion [10] : 10 [1] : 1 [Euflexxa] : Euflexxa [Constant] : constant [de-identified] : 1/25/23: F/u right knee. Symptoms persist despite aspiration at last visit. She had outside consult, who recommended TKA.\par \par 1/4/22: Patient presents with persistent right knee pain. She has been wearing HKB and taking Motrin/Tylenol. She had MRI performed. \par \par Prior UC note:\par 12/30/2022: Pt here with one day of right knee pain. \par Pt states she noted pain with kneeling to standing and now cannot turn in different directions and she feels instability. \par \par PMH: Hypothyroid. Constipation, GERD.\par Allergies: Steroids, pcn, vicodin/codeine (itching).  [] : Post Surgical Visit: no [FreeTextEntry1] : RT knee [FreeTextEntry5] : \par  [de-identified] : Brace, CSI+Asp [de-identified] : RT knee

## 2023-01-25 NOTE — PHYSICAL EXAM
[Right] : right knee [NL (0)] : extension 0 degrees [] : minimal effusion [TWNoteComboBox7] : flexion 125 degrees

## 2023-01-31 ENCOUNTER — APPOINTMENT (OUTPATIENT)
Dept: ORTHOPEDIC SURGERY | Facility: CLINIC | Age: 53
End: 2023-01-31
Payer: COMMERCIAL

## 2023-01-31 ENCOUNTER — FORM ENCOUNTER (OUTPATIENT)
Age: 53
End: 2023-01-31

## 2023-01-31 DIAGNOSIS — Z00.00 ENCOUNTER FOR GENERAL ADULT MEDICAL EXAMINATION W/OUT ABNORMAL FINDINGS: ICD-10-CM

## 2023-01-31 DIAGNOSIS — M17.11 UNILATERAL PRIMARY OSTEOARTHRITIS, RIGHT KNEE: ICD-10-CM

## 2023-01-31 PROCEDURE — 99215 OFFICE O/P EST HI 40 MIN: CPT

## 2023-01-31 PROCEDURE — 73502 X-RAY EXAM HIP UNI 2-3 VIEWS: CPT

## 2023-01-31 NOTE — IMAGING
[Right] : right hip with pelvis [All Views] : anteroposterior, lateral [FreeTextEntry9] : DEGENERATIVE CHANGES

## 2023-01-31 NOTE — HISTORY OF PRESENT ILLNESS
[Result of repetitive motion] : result of repetitive motion [10] : 10 [Household chores] : household chores [Leisure] : leisure [Rest] : rest [Walking] : walking [Dull/Aching] : dull/aching [Sharp] : sharp [Constant] : constant [de-identified] : 1/31/23 Pt is here for RT knee pain since Dec 2022. Right knee meniscus repair done by Dr. Hart 6-7 yrs ago. NKI.  [] : no [FreeTextEntry1] : RT knee [FreeTextEntry7] : up and down the leg after activity [de-identified] : 1/25/23 [de-identified] : Dr. Hart [de-identified] : MRI, X-ray

## 2023-01-31 NOTE — PHYSICAL EXAM
[Right] : right knee [] : patient ambulates without assistive device [TWNoteComboBox7] : flexion 125 degrees [de-identified] : extension 5 degrees

## 2023-01-31 NOTE — ASSESSMENT
[FreeTextEntry1] : 52 year F WITH MODERATE RT KNEE PAIN. HAS SEEN DR. AGUILAR IN THE PAST AND WAS GIVEN HKB. PAIN WORSENS WITH STAIRS AND WALKING PROLONGED DISTANCES. PAIN IS AFFECTING ADL AND FUNCTIONAL ACTIVITIES. XRAYS AND MRI FROM 12/30/22 REVIEWED WITH ADVANCED LATERAL OA. TREATMENT OPTIONS REVIEWED.\par \par PMHx: HYPOTHYROIDISM, HTN\par ALLERGY TO VICODIN, HAS HAD ADVERSE REACTIONS TO CORTISONE. \par NO METAL ALLERGIES\par NO H/O DM\par NO H/O DVT/PE \par \par \par RT TKA - M CR PRESS FIT\par \par DISCUSSED R&B OF TKA. WILL ORDER CT TO EVAL FOR BONE LOSS AND DEFORMITY FOR PRE-OP PLANNING. \par \par The patient was advised of the diagnosis. The natural history of the pathology was  explained in full to the patient in layman's terms. All questions were answered. The risks\par and benefits of surgical and non-surgical treatment alternatives were explained in full the patient. \par \par We discussed my findings and the natural history of their condition. We talked about the details of the proposed surgery and the recovery. We discussed the material risks, possible benefits and alternatives to surgery. The risks include but are not limited to infection, bleeding and possible need for blood transfusion, fracture, bowel blockage, bladder retention or infection, need for reoperation, stiffness and/or limited range of motion, possible damage to nerves and blood vessels, failure of fixation of components, risk of deep vein thromboses and pulmonary embolism, wound healing problems, dislocation, and possible leg length discrepancy. Although incredibly rare, we also discussed the risks of a cardiac event, stroke and even death during, or following, the surgery. We discussed the type of implants the patient will be receiving and the type of fixation that will be used, as well as whether a robot or computer navigation aide will be used. The patient understands they will need medical clearance and will attend a preoperative joint education class. We also discussed the type of anesthesia they will receive, and the risks associated with hospital or rehab length of stay, obesity, diabetes and smoking.\par \par Patient Complains of pain in the affected joint with a level that often reaches greater than a 8/10. The Pain has been progressively worsening of his/her treatment coarse. The pain has interfered with their ADLs and worsens with weight bearing. On exam they often have episodes of swelling/effusion with limited ROM. Pain worsens with ROM passive and active and I can palpate crepitus. \par X- rays were reviewed with the patient and they show joint space narrowing, subchondral sclerosis, osteophyte formation, and subchondral cysts.\par After a period of more than 12 weeks physical therapy or exercise program done with me or another treating physician they have continued pain. The patient has failed a trial of NSAID medication or pain relieves if they were unable to tolerate NSAID medications as well as a series of injection, steroid or Hyaluronic Acid. After a long discussion with the patient both the patient and I have decided we have exhausted all forms of less radical treatments and they would like to proceed with Total Joint Replacement.

## 2023-02-01 ENCOUNTER — APPOINTMENT (OUTPATIENT)
Dept: CT IMAGING | Facility: CLINIC | Age: 53
End: 2023-02-01
Payer: COMMERCIAL

## 2023-02-01 ENCOUNTER — APPOINTMENT (OUTPATIENT)
Dept: ORTHOPEDIC SURGERY | Facility: CLINIC | Age: 53
End: 2023-02-01

## 2023-02-01 PROCEDURE — 73700 CT LOWER EXTREMITY W/O DYE: CPT | Mod: RT

## 2023-02-01 PROCEDURE — 76376 3D RENDER W/INTRP POSTPROCES: CPT | Mod: RT

## 2023-02-09 ENCOUNTER — NON-APPOINTMENT (OUTPATIENT)
Age: 53
End: 2023-02-09

## 2023-02-09 ENCOUNTER — APPOINTMENT (OUTPATIENT)
Dept: CARDIOLOGY | Facility: CLINIC | Age: 53
End: 2023-02-09
Payer: COMMERCIAL

## 2023-02-09 VITALS
OXYGEN SATURATION: 100 % | HEIGHT: 64 IN | WEIGHT: 137 LBS | BODY MASS INDEX: 23.39 KG/M2 | DIASTOLIC BLOOD PRESSURE: 70 MMHG | HEART RATE: 67 BPM | SYSTOLIC BLOOD PRESSURE: 118 MMHG

## 2023-02-09 PROCEDURE — 99215 OFFICE O/P EST HI 40 MIN: CPT | Mod: 25

## 2023-02-09 PROCEDURE — 99402 PREV MED CNSL INDIV APPRX 30: CPT

## 2023-02-09 PROCEDURE — 93000 ELECTROCARDIOGRAM COMPLETE: CPT

## 2023-02-10 LAB
ALBUMIN SERPL ELPH-MCNC: 4.6 G/DL
ALP BLD-CCNC: 60 U/L
ALT SERPL-CCNC: 27 U/L
ANION GAP SERPL CALC-SCNC: 11 MMOL/L
AST SERPL-CCNC: 20 U/L
BASOPHILS # BLD AUTO: 0.07 K/UL
BASOPHILS NFR BLD AUTO: 1.4 %
BILIRUB SERPL-MCNC: 0.4 MG/DL
BUN SERPL-MCNC: 20 MG/DL
CALCIUM SERPL-MCNC: 10.5 MG/DL
CHLORIDE SERPL-SCNC: 104 MMOL/L
CHOLEST SERPL-MCNC: 192 MG/DL
CO2 SERPL-SCNC: 28 MMOL/L
CREAT SERPL-MCNC: 0.67 MG/DL
EGFR: 104 ML/MIN/1.73M2
EOSINOPHIL # BLD AUTO: 0.09 K/UL
EOSINOPHIL NFR BLD AUTO: 1.8 %
ESTIMATED AVERAGE GLUCOSE: 108 MG/DL
GLUCOSE SERPL-MCNC: 85 MG/DL
HBA1C MFR BLD HPLC: 5.4 %
HCT VFR BLD CALC: 41 %
HDLC SERPL-MCNC: 81 MG/DL
HGB BLD-MCNC: 13.4 G/DL
IMM GRANULOCYTES NFR BLD AUTO: 0.2 %
LDLC SERPL CALC-MCNC: 96 MG/DL
LYMPHOCYTES # BLD AUTO: 2.26 K/UL
LYMPHOCYTES NFR BLD AUTO: 45.6 %
MAN DIFF?: NORMAL
MCHC RBC-ENTMCNC: 30.2 PG
MCHC RBC-ENTMCNC: 32.7 GM/DL
MCV RBC AUTO: 92.3 FL
MONOCYTES # BLD AUTO: 0.45 K/UL
MONOCYTES NFR BLD AUTO: 9.1 %
NEUTROPHILS # BLD AUTO: 2.08 K/UL
NEUTROPHILS NFR BLD AUTO: 41.9 %
NONHDLC SERPL-MCNC: 111 MG/DL
PLATELET # BLD AUTO: 315 K/UL
POTASSIUM SERPL-SCNC: 5.7 MMOL/L
PROT SERPL-MCNC: 6.8 G/DL
RBC # BLD: 4.44 M/UL
RBC # FLD: 13.4 %
SODIUM SERPL-SCNC: 142 MMOL/L
TRIGL SERPL-MCNC: 72 MG/DL
TSH SERPL-ACNC: 2.38 UIU/ML
WBC # FLD AUTO: 4.96 K/UL

## 2023-02-19 NOTE — PHYSICAL EXAM
[Well Developed] : well developed [Well Nourished] : well nourished [No Acute Distress] : no acute distress [Normal Conjunctiva] : normal conjunctiva [Normal Venous Pressure] : normal venous pressure [No Carotid Bruit] : no carotid bruit [Normal S1, S2] : normal S1, S2 [No Murmur] : no murmur [No Rub] : no rub [No Gallop] : no gallop [Normal] : clear lung fields, good air entry, no respiratory distress [Clear Lung Fields] : clear lung fields [Good Air Entry] : good air entry [No Respiratory Distress] : no respiratory distress  [Non Tender] : non-tender [Soft] : abdomen soft [No Masses/organomegaly] : no masses/organomegaly [Normal Bowel Sounds] : normal bowel sounds [Normal Gait] : normal gait [No Edema] : no edema [No Cyanosis] : no cyanosis [No Clubbing] : no clubbing [No Varicosities] : no varicosities [No Rash] : no rash [No Skin Lesions] : no skin lesions [Moves all extremities] : moves all extremities [No Focal Deficits] : no focal deficits [Normal Speech] : normal speech [Alert and Oriented] : alert and oriented [Normal memory] : normal memory

## 2023-02-21 ENCOUNTER — NON-APPOINTMENT (OUTPATIENT)
Age: 53
End: 2023-02-21

## 2023-02-21 ENCOUNTER — APPOINTMENT (OUTPATIENT)
Dept: ELECTROPHYSIOLOGY | Facility: CLINIC | Age: 53
End: 2023-02-21
Payer: COMMERCIAL

## 2023-02-21 VITALS
HEIGHT: 64 IN | SYSTOLIC BLOOD PRESSURE: 118 MMHG | DIASTOLIC BLOOD PRESSURE: 78 MMHG | HEART RATE: 78 BPM | OXYGEN SATURATION: 97 % | BODY MASS INDEX: 23.39 KG/M2 | WEIGHT: 137 LBS

## 2023-02-21 DIAGNOSIS — I49.1 ATRIAL PREMATURE DEPOLARIZATION: ICD-10-CM

## 2023-02-21 PROCEDURE — 93000 ELECTROCARDIOGRAM COMPLETE: CPT

## 2023-02-21 PROCEDURE — 99205 OFFICE O/P NEW HI 60 MIN: CPT | Mod: 25

## 2023-02-21 RX ORDER — MELOXICAM 15 MG/1
15 TABLET ORAL
Qty: 30 | Refills: 1 | Status: DISCONTINUED | COMMUNITY
Start: 2023-02-16 | End: 2023-02-21

## 2023-02-21 RX ORDER — NITROFURANTOIN (MONOHYDRATE/MACROCRYSTALS) 25; 75 MG/1; MG/1
100 CAPSULE ORAL
Qty: 14 | Refills: 0 | Status: COMPLETED | COMMUNITY
Start: 2023-02-11

## 2023-02-21 RX ORDER — HYALURONATE SODIUM 20 MG/2 ML
20 SYRINGE (ML) INTRAARTICULAR
Qty: 3 | Refills: 0 | Status: DISCONTINUED | COMMUNITY
Start: 2023-01-06 | End: 2023-02-21

## 2023-02-21 RX ORDER — TRAMADOL HYDROCHLORIDE 50 MG/1
50 TABLET, COATED ORAL TWICE DAILY
Qty: 40 | Refills: 0 | Status: DISCONTINUED | COMMUNITY
Start: 2023-02-16 | End: 2023-02-21

## 2023-02-21 RX ORDER — IBUPROFEN 800 MG/1
800 TABLET, FILM COATED ORAL 3 TIMES DAILY
Qty: 90 | Refills: 2 | Status: DISCONTINUED | COMMUNITY
Start: 2022-10-03 | End: 2023-02-21

## 2023-02-21 RX ORDER — PANTOPRAZOLE 40 MG/1
40 TABLET, DELAYED RELEASE ORAL TWICE DAILY
Qty: 180 | Refills: 1 | Status: ACTIVE | COMMUNITY
Start: 2019-04-08

## 2023-02-21 RX ORDER — METOPROLOL SUCCINATE 25 MG/1
25 TABLET, EXTENDED RELEASE ORAL DAILY
Qty: 90 | Refills: 1 | Status: DISCONTINUED | COMMUNITY
Start: 2021-08-10 | End: 2023-02-21

## 2023-02-21 RX ORDER — TRAMADOL HYDROCHLORIDE 50 MG/1
50 TABLET, COATED ORAL 3 TIMES DAILY
Qty: 60 | Refills: 0 | Status: DISCONTINUED | COMMUNITY
Start: 2022-06-20 | End: 2023-02-21

## 2023-02-21 RX ORDER — IBUPROFEN 800 MG/1
800 TABLET, FILM COATED ORAL EVERY 8 HOURS
Qty: 90 | Refills: 0 | Status: DISCONTINUED | COMMUNITY
Start: 2022-12-31 | End: 2023-02-21

## 2023-02-21 RX ORDER — OXYCODONE 5 MG/1
5 TABLET ORAL TWICE DAILY
Qty: 60 | Refills: 0 | Status: DISCONTINUED | COMMUNITY
Start: 2022-06-09 | End: 2023-02-21

## 2023-02-21 RX ORDER — CYCLOBENZAPRINE HYDROCHLORIDE 10 MG/1
10 TABLET, FILM COATED ORAL
Qty: 40 | Refills: 0 | Status: DISCONTINUED | COMMUNITY
Start: 2022-06-09 | End: 2023-02-21

## 2023-02-21 RX ORDER — BUDESONIDE, GLYCOPYRROLATE, AND FORMOTEROL FUMARATE 160; 9; 4.8 UG/1; UG/1; UG/1
160-9-4.8 AEROSOL, METERED RESPIRATORY (INHALATION)
Qty: 1 | Refills: 5 | Status: DISCONTINUED | COMMUNITY
Start: 2021-07-22 | End: 2023-02-21

## 2023-02-21 RX ORDER — POLYETHYLENE GLYCOL 3350 AND ELECTROLYTES WITH LEMON FLAVOR 236; 22.74; 6.74; 5.86; 2.97 G/4L; G/4L; G/4L; G/4L; G/4L
236 POWDER, FOR SOLUTION ORAL
Qty: 1 | Refills: 0 | Status: DISCONTINUED | COMMUNITY
Start: 2022-06-14 | End: 2023-02-21

## 2023-02-21 RX ORDER — METOPROLOL SUCCINATE 25 MG/1
25 TABLET, EXTENDED RELEASE ORAL DAILY
Qty: 30 | Refills: 5 | Status: DISCONTINUED | COMMUNITY
Start: 2022-07-13 | End: 2023-02-21

## 2023-02-21 RX ORDER — IBUPROFEN 400 MG/1
400 TABLET, FILM COATED ORAL
Qty: 60 | Refills: 0 | Status: DISCONTINUED | COMMUNITY
Start: 2022-08-25 | End: 2023-02-21

## 2023-02-21 RX ORDER — FAMOTIDINE 20 MG/1
20 TABLET, FILM COATED ORAL
Refills: 0 | Status: ACTIVE | COMMUNITY
Start: 2023-02-21

## 2023-02-21 NOTE — DISCUSSION/SUMMARY
[FreeTextEntry1] : 52 year old woman with palpitations.  Recent outpatient monitoring has demonstrated symptoms to correlate to supraventricular ectopy.  That being said the initial Zio from 2021 showed that some symptom triggers correlated to sinus rhythm.  Evaluation has been significant for an elevated potassium.  In general hyperkalemia decreases automaticity (as seen with the sinus node), however we did discuss that electrolyte abnormalities may potentiate arrhythmia. Optimistically therapy of this can improve her symptoms.  We discussed options of therapy pending this evaluation.  She had previously not done well on metoprolol.  We discussed antiarrhythmic medications.  We will try Multaq and prn propranolol.  Should symptoms persist we may consider electrophysiology testing with an aim at ablation. I did explain that being able to map the ectopy is critical to the success of such a procedure and that her current burden on the Zio was 2%.  Furthermore there was no ectopy on the ETT.  I cautioned her that this has implications for success AND that she has previously demonstrated symptoms correlating to SR, ie she will still have symptoms, although may be improved.

## 2023-02-21 NOTE — HISTORY OF PRESENT ILLNESS
[FreeTextEntry1] : 52 Year Old Female - D - FHX of MI 52 yrs in Father's Side - Hypothyroidism, Dysphagia, Poor esophageal motility, S/p tracheo- bronchoplasty (2018) with baseline SOB, Obesity, Mild ASCVD (On Chest CT), Bouts of SVT, Statin Intolerance to Crestor/Atorvastatin (Baseline LDLc in 190s) currently on On Ozempic And Praluent. She lost 57 lbs over the past year.  She walks 6 miles/day.  She is awaiting knee replacement surgery.  \par \par History of palpitations for some time.  For the past 2 weeks she has noted an increase in frequency of palpitations.  There has been new associated stomach discomfort.  There has been some associated LH/dizzy (mild).  She has not fallen or collapsed. The sensation makes her anxious.  3 weeks ago she was on doxycycline for an ingrown toe nail.  She was subsequently also given Macrobid for urinary symptoms.  UA was negative and this was discontinued.  Her palpitations may occur during her 6 mile walk or when she is resting in bed, ie no identifiable triggers. At one point she was tried on metoprolol, but this was stopped secondary to low blood pressure.

## 2023-02-21 NOTE — CARDIOLOGY SUMMARY
[de-identified] : today: Sinus rhythm [de-identified] : 12/8/2022\par 30 runs of SVT (ABEBA) [de-identified] : 8/8/2022\par 9 minutes of exercise (10 METS)\par No arrhythmia\par Negative ECG response [de-identified] : 9/23/2021\par Normal LV systolic function\par No LA dilation\par Mild LVH\par

## 2023-02-22 ENCOUNTER — NON-APPOINTMENT (OUTPATIENT)
Age: 53
End: 2023-02-22

## 2023-02-23 ENCOUNTER — APPOINTMENT (OUTPATIENT)
Dept: CT IMAGING | Facility: CLINIC | Age: 53
End: 2023-02-23

## 2023-02-23 ENCOUNTER — TRANSCRIPTION ENCOUNTER (OUTPATIENT)
Age: 53
End: 2023-02-23

## 2023-02-23 NOTE — ASU PATIENT PROFILE, ADULT - ALCOHOL USE HISTORY SINGLE SELECT
yes... Opioid Pregnancy And Lactation Text: These medications can lead to premature delivery and should be avoided during pregnancy. These medications are also present in breast milk in small amounts.

## 2023-02-25 ENCOUNTER — NON-APPOINTMENT (OUTPATIENT)
Age: 53
End: 2023-02-25

## 2023-02-28 ENCOUNTER — APPOINTMENT (OUTPATIENT)
Dept: CT IMAGING | Facility: CLINIC | Age: 53
End: 2023-02-28
Payer: COMMERCIAL

## 2023-02-28 PROCEDURE — 71250 CT THORAX DX C-: CPT

## 2023-03-01 ENCOUNTER — LABORATORY RESULT (OUTPATIENT)
Age: 53
End: 2023-03-01

## 2023-03-01 ENCOUNTER — APPOINTMENT (OUTPATIENT)
Dept: NEPHROLOGY | Facility: CLINIC | Age: 53
End: 2023-03-01
Payer: COMMERCIAL

## 2023-03-01 PROCEDURE — 99204 OFFICE O/P NEW MOD 45 MIN: CPT | Mod: 95

## 2023-03-01 NOTE — REVIEW OF SYSTEMS
[Feeling Poorly] : not feeling poorly [Feeling Tired] : not feeling tired [Eyesight Problems] : no eyesight problems [Nosebleeds] : no nosebleeds [Chest Pain] : no chest pain [Palpitations] : palpitations [Shortness Of Breath] : no shortness of breath [Cough] : no cough [Abdominal Pain] : no abdominal pain [Vomiting] : vomiting [Dysuria] : no dysuria [Incontinence] : no incontinence [Dizziness] : no dizziness [Fainting] : no fainting [Anxiety] : no anxiety [Depression] : no depression [FreeTextEntry7] : recent GI illness now improved [FreeTextEntry9] : neck and knee pain

## 2023-03-01 NOTE — HISTORY OF PRESENT ILLNESS
[Home] : at home, [unfilled] , at the time of the visit. 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  She has a history of tracheo malacia, esophageal dysmotility, SVT.  She has been having some MSK issues including cervical spinal degenerative disease and she requires knee surgery as well.  Since december she has been taking ibuprofen 800 mg PO TID for her pain.  In that setting she had blood work done in February showing two episodes of elevated serum potassium.  Meanwhile she has also had increasing palpitations and GI upset.  She has two primary concerns.  First that an elevated potassium will interfere with her upcoming knee surgery and second that her prescribed antiarrythmics will contribute to her hyperkalemia.  Upon evaluation today she denies chest pain sob, has been doing well to lose weight.  She has been off NSAIDs since mid february, she is watching the potassium in her diet.  She has no history of kidney stones no urinary complaints no family history of kidney issues.  She reports that she is not presently taking flecainide, propranolol, or multaq.

## 2023-03-01 NOTE — ASSESSMENT
[FreeTextEntry1] : 52 years old woman with SVT, MSK pain, here for evaluation of hyperkalemia\par \par Elevated Serum Potassium -- I have reviewed the patient's chart and blood work going back to 2018.  She did not have any episodes of hyperkalemia until February 2023 at which time her K was 5.7 on outpatient labs and upon repeat was 5.3 (on plasma).  The patient's rise in potassium coincides with her starting to take ibuprofen 800 TID since December 2022.  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  She has been off of all NSAIDs since mid February.  Tylenol and tramadol are acceptable in terms of potassium.  I have no objection to flecainide.  Propranolol can cause hyperkalemia (as beta blockers tend to) but is typically mild and if potassium is WNL off NSAIDs I have no objection to her using it PRN.  Our plan will be to check a serum potassium level along with plasma renin-brooks and urine studies.  \par She can follow up with me on an as needed basis if potassium remains WNL.

## 2023-03-02 ENCOUNTER — NON-APPOINTMENT (OUTPATIENT)
Age: 53
End: 2023-03-02

## 2023-03-02 LAB
ALBUMIN SERPL ELPH-MCNC: 4.5 G/DL
ALDOSTERONE SERUM: 7 NG/DL
ANION GAP SERPL CALC-SCNC: 10 MMOL/L
APPEARANCE: CLEAR
BACTERIA: NEGATIVE
BILIRUBIN URINE: NEGATIVE
BLOOD URINE: NEGATIVE
BUN SERPL-MCNC: 18 MG/DL
CALCIUM SERPL-MCNC: 9.9 MG/DL
CHLORIDE SERPL-SCNC: 103 MMOL/L
CO2 SERPL-SCNC: 27 MMOL/L
COLOR: NORMAL
CREAT SERPL-MCNC: 0.73 MG/DL
CREAT SPEC-SCNC: 23 MG/DL
EGFR: 98 ML/MIN/1.73M2
GLUCOSE QUALITATIVE U: NEGATIVE
GLUCOSE SERPL-MCNC: 84 MG/DL
HYALINE CASTS: 0 /LPF
KETONES URINE: NEGATIVE
LEUKOCYTE ESTERASE URINE: NEGATIVE
MICROSCOPIC-UA: NORMAL
NITRITE URINE: NEGATIVE
PH URINE: 6
PHOSPHATE SERPL-MCNC: 4.6 MG/DL
POTASSIUM SERPL-SCNC: 4.9 MMOL/L
POTASSIUM UR-SCNC: 23 MMOL/L
PROTEIN URINE: NEGATIVE
RED BLOOD CELLS URINE: 2 /HPF
SODIUM SERPL-SCNC: 140 MMOL/L
SPECIFIC GRAVITY URINE: 1.01
SQUAMOUS EPITHELIAL CELLS: 1 /HPF
UROBILINOGEN URINE: NORMAL
WHITE BLOOD CELLS URINE: 0 /HPF

## 2023-03-07 ENCOUNTER — OUTPATIENT (OUTPATIENT)
Dept: OUTPATIENT SERVICES | Facility: HOSPITAL | Age: 53
LOS: 1 days | End: 2023-03-07
Payer: COMMERCIAL

## 2023-03-07 VITALS
OXYGEN SATURATION: 99 % | HEIGHT: 64 IN | HEART RATE: 77 BPM | DIASTOLIC BLOOD PRESSURE: 70 MMHG | TEMPERATURE: 98 F | WEIGHT: 134.92 LBS | RESPIRATION RATE: 14 BRPM | SYSTOLIC BLOOD PRESSURE: 103 MMHG

## 2023-03-07 DIAGNOSIS — Z98.49 CATARACT EXTRACTION STATUS, UNSPECIFIED EYE: Chronic | ICD-10-CM

## 2023-03-07 DIAGNOSIS — Z98.890 OTHER SPECIFIED POSTPROCEDURAL STATES: Chronic | ICD-10-CM

## 2023-03-07 DIAGNOSIS — Z98.51 TUBAL LIGATION STATUS: Chronic | ICD-10-CM

## 2023-03-07 DIAGNOSIS — Z98.1 ARTHRODESIS STATUS: Chronic | ICD-10-CM

## 2023-03-07 DIAGNOSIS — M17.11 UNILATERAL PRIMARY OSTEOARTHRITIS, RIGHT KNEE: ICD-10-CM

## 2023-03-07 DIAGNOSIS — Z86.018 PERSONAL HISTORY OF OTHER BENIGN NEOPLASM: Chronic | ICD-10-CM

## 2023-03-07 DIAGNOSIS — Z90.49 ACQUIRED ABSENCE OF OTHER SPECIFIED PARTS OF DIGESTIVE TRACT: Chronic | ICD-10-CM

## 2023-03-07 DIAGNOSIS — Z01.818 ENCOUNTER FOR OTHER PREPROCEDURAL EXAMINATION: ICD-10-CM

## 2023-03-07 LAB
A1C WITH ESTIMATED AVERAGE GLUCOSE RESULT: 5.4 % — SIGNIFICANT CHANGE UP (ref 4–5.6)
ALBUMIN SERPL ELPH-MCNC: 4 G/DL — SIGNIFICANT CHANGE UP (ref 3.3–5)
ALP SERPL-CCNC: 61 U/L — SIGNIFICANT CHANGE UP (ref 30–120)
ALT FLD-CCNC: 44 U/L DA — SIGNIFICANT CHANGE UP (ref 10–60)
ANION GAP SERPL CALC-SCNC: 5 MMOL/L — SIGNIFICANT CHANGE UP (ref 5–17)
APTT BLD: 31 SEC — SIGNIFICANT CHANGE UP (ref 27.5–35.5)
AST SERPL-CCNC: 27 U/L — SIGNIFICANT CHANGE UP (ref 10–40)
BILIRUB SERPL-MCNC: 0.4 MG/DL — SIGNIFICANT CHANGE UP (ref 0.2–1.2)
BLD GP AB SCN SERPL QL: SIGNIFICANT CHANGE UP
BUN SERPL-MCNC: 16 MG/DL — SIGNIFICANT CHANGE UP (ref 7–23)
CALCIUM SERPL-MCNC: 9.4 MG/DL — SIGNIFICANT CHANGE UP (ref 8.4–10.5)
CHLORIDE SERPL-SCNC: 104 MMOL/L — SIGNIFICANT CHANGE UP (ref 96–108)
CO2 SERPL-SCNC: 32 MMOL/L — HIGH (ref 22–31)
CREAT SERPL-MCNC: 0.62 MG/DL — SIGNIFICANT CHANGE UP (ref 0.5–1.3)
EGFR: 106 ML/MIN/1.73M2 — SIGNIFICANT CHANGE UP
ESTIMATED AVERAGE GLUCOSE: 108 MG/DL — SIGNIFICANT CHANGE UP (ref 68–114)
GLUCOSE SERPL-MCNC: 91 MG/DL — SIGNIFICANT CHANGE UP (ref 70–99)
HCT VFR BLD CALC: 39.8 % — SIGNIFICANT CHANGE UP (ref 34.5–45)
HGB BLD-MCNC: 13.1 G/DL — SIGNIFICANT CHANGE UP (ref 11.5–15.5)
INR BLD: 0.96 RATIO — SIGNIFICANT CHANGE UP (ref 0.88–1.16)
MCHC RBC-ENTMCNC: 30.5 PG — SIGNIFICANT CHANGE UP (ref 27–34)
MCHC RBC-ENTMCNC: 32.9 GM/DL — SIGNIFICANT CHANGE UP (ref 32–36)
MCV RBC AUTO: 92.8 FL — SIGNIFICANT CHANGE UP (ref 80–100)
NRBC # BLD: 0 /100 WBCS — SIGNIFICANT CHANGE UP (ref 0–0)
PLATELET # BLD AUTO: 271 K/UL — SIGNIFICANT CHANGE UP (ref 150–400)
POTASSIUM SERPL-MCNC: 5.3 MMOL/L — SIGNIFICANT CHANGE UP (ref 3.5–5.3)
POTASSIUM SERPL-SCNC: 5.3 MMOL/L — SIGNIFICANT CHANGE UP (ref 3.5–5.3)
PROT SERPL-MCNC: 7.2 G/DL — SIGNIFICANT CHANGE UP (ref 6–8.3)
PROTHROM AB SERPL-ACNC: 11 SEC — SIGNIFICANT CHANGE UP (ref 10.5–13.4)
RBC # BLD: 4.29 M/UL — SIGNIFICANT CHANGE UP (ref 3.8–5.2)
RBC # FLD: 13.2 % — SIGNIFICANT CHANGE UP (ref 10.3–14.5)
SODIUM SERPL-SCNC: 141 MMOL/L — SIGNIFICANT CHANGE UP (ref 135–145)
WBC # BLD: 4.08 K/UL — SIGNIFICANT CHANGE UP (ref 3.8–10.5)
WBC # FLD AUTO: 4.08 K/UL — SIGNIFICANT CHANGE UP (ref 3.8–10.5)

## 2023-03-07 PROCEDURE — G0463: CPT

## 2023-03-07 RX ORDER — TEMAZEPAM 15 MG/1
1 CAPSULE ORAL
Qty: 0 | Refills: 0 | DISCHARGE

## 2023-03-07 RX ORDER — BEMPEDOIC ACID 180 MG/1
0 TABLET, FILM COATED ORAL
Qty: 0 | Refills: 0 | DISCHARGE

## 2023-03-07 RX ORDER — PANTOPRAZOLE SODIUM 20 MG/1
1 TABLET, DELAYED RELEASE ORAL
Qty: 0 | Refills: 0 | DISCHARGE

## 2023-03-07 RX ORDER — DOCUSATE SODIUM 100 MG
1 CAPSULE ORAL
Qty: 0 | Refills: 0 | DISCHARGE

## 2023-03-07 NOTE — H&P PST ADULT - NSICDXPASTSURGICALHX_GEN_ALL_CORE_FT
PAST SURGICAL HISTORY:  H/O arthroscopy of right knee     H/O bilateral breast reduction surgery     H/O parotidectomy     H/O shoulder surgery left    H/O tubal ligation bilateral    History of abdominoplasty 2012, mini    History of bladder surgery sling    History of carpal tunnel release left    History of cataract surgery bilateral    History of lipoma removed from left shoulder    History of parotidectomy left    S/P cervical spinal fusion     S/P tracheoplasty 2018 can use size 7 ET tube "I have been told I am totally fine now"    Status post arthroscopy of hip left    Status post right breast lumpectomy 1996

## 2023-03-07 NOTE — H&P PST ADULT - NSICDXFAMILYHX_GEN_ALL_CORE_FT
FAMILY HISTORY:  Father  Still living? No  Family history of diabetes mellitus, Age at diagnosis: Age Unknown  Family history of heart disease, Age at diagnosis: Age Unknown  Family history of hypertension, Age at diagnosis: Age Unknown    Mother  Still living? Yes, Estimated age: 61-70  Family history of diabetes mellitus, Age at diagnosis: Age Unknown  Family history of DVT, Age at diagnosis: Age Unknown

## 2023-03-07 NOTE — H&P PST ADULT - PROBLEM SELECTOR PLAN 1
scheduled for right knee replacement   will obtain medical and cardiac scheduled for right knee replacement 3/24/23  will obtain medical and cardiac clearance   Thoracic surgeon note h/o tracheoplasty   pre op instructions on wash and medications   COVID test on 3/20/23 at [place]

## 2023-03-07 NOTE — H&P PST ADULT - HISTORY OF PRESENT ILLNESS
52 year old female with a past medical history of tracheomalacia, GERD, hypothyroidism anxiety, and HLD c/ 52 year old female who presents with progressively worsening right knee pain and difficulty walking for the past several months . Reports constant pain and pain exacerbates with walking and any activities , scheduled for right knee replacement on 3/24/23

## 2023-03-07 NOTE — H&P PST ADULT - NSICDXPASTMEDICALHX_GEN_ALL_CORE_FT
PAST MEDICAL HISTORY:  Allergic rhinitis, seasonal     Anxiety     Esophageal dysmotility     GERD (gastroesophageal reflux disease)     HLD (hyperlipidemia)     Hypothyroidism     IBS (irritable bowel syndrome)     Low serum vitamin D     Osteoarthritis of right knee     SVT (supraventricular tachycardia)      PAST MEDICAL HISTORY:  Allergic rhinitis, seasonal     Anxiety     Esophageal dysmotility     GERD (gastroesophageal reflux disease)     History of hyperkalemia     HLD (hyperlipidemia)     Hypothyroidism     IBS (irritable bowel syndrome)     Low serum vitamin D     Osteoarthritis of right knee     SVT (supraventricular tachycardia)

## 2023-03-08 LAB
MRSA PCR RESULT.: SIGNIFICANT CHANGE UP
S AUREUS DNA NOSE QL NAA+PROBE: SIGNIFICANT CHANGE UP

## 2023-03-14 ENCOUNTER — APPOINTMENT (OUTPATIENT)
Dept: ORTHOPEDIC SURGERY | Facility: CLINIC | Age: 53
End: 2023-03-14

## 2023-03-16 ENCOUNTER — APPOINTMENT (OUTPATIENT)
Dept: THORACIC SURGERY | Facility: CLINIC | Age: 53
End: 2023-03-16
Payer: COMMERCIAL

## 2023-03-16 VITALS
RESPIRATION RATE: 18 BRPM | HEIGHT: 64 IN | DIASTOLIC BLOOD PRESSURE: 76 MMHG | WEIGHT: 137 LBS | SYSTOLIC BLOOD PRESSURE: 111 MMHG | OXYGEN SATURATION: 98 % | BODY MASS INDEX: 23.39 KG/M2 | HEART RATE: 86 BPM

## 2023-03-16 PROCEDURE — 99214 OFFICE O/P EST MOD 30 MIN: CPT

## 2023-03-20 NOTE — CONSULT LETTER
[Dear  ___] : Dear  [unfilled], [Consult Letter:] : I had the pleasure of evaluating your patient, [unfilled]. [( Thank you for referring [unfilled] for consultation for _____ )] : Thank you for referring [unfilled] for consultation for [unfilled] [Please see my note below.] : Please see my note below. [Consult Closing:] : Thank you very much for allowing me to participate in the care of this patient.  If you have any questions, please do not hesitate to contact me. [Sincerely,] : Sincerely, [FreeTextEntry2] : Jack Summers MD (Pulm) [FreeTextEntry3] : Hans Segundo MD, MPH \par System Director of Thoracic Surgery \par Director of Comprehensive Lung and Foregut [place] \par Professor Cardiovascular & Thoracic Surgery  \par [employer] [employer] [facility]\par \par [facility]\par [address]\par Oncology [address]\[place] [address]\par Tel: [phone]\par Fax: [phone]\par

## 2023-03-20 NOTE — PHYSICAL EXAM
[] : no respiratory distress [Respiration, Rhythm And Depth] : normal respiratory rhythm and effort [Exaggerated Use Of Accessory Muscles For Inspiration] : no accessory muscle use [Auscultation Breath Sounds / Voice Sounds] : lungs were clear to auscultation bilaterally [Heart Rate And Rhythm] : heart rate was normal and rhythm regular [Examination Of The Chest] : the chest was normal in appearance [Chest Visual Inspection Thoracic Asymmetry] : no chest asymmetry [Diminished Respiratory Excursion] : normal chest expansion [2+] : left 2+ [Cervical Lymph Nodes Enlarged Posterior Bilaterally] : posterior cervical [Cervical Lymph Nodes Enlarged Anterior Bilaterally] : anterior cervical [Supraclavicular Lymph Nodes Enlarged Bilaterally] : supraclavicular [No Focal Deficits] : no focal deficits [Oriented To Time, Place, And Person] : oriented to person, place, and time [FreeTextEntry1] : Well approximated, well healed surgical incision

## 2023-03-20 NOTE — ASSESSMENT
[FreeTextEntry1] : Ms. ANDREW AGUILAR, 52 year old female, never smoker, w/ hx of HLD, pertussis, GERD, recent diagnosis of SVT. She is status post Right VATS, robotic assisted, tracheobronchoplasty with Prolene mesh on 3/12/18 by Dr. Goodson. She was initially referred by Dr. Summers.\par \par She is s/p Awake/Dynamic Bronch on 9/17/2021 by Dr. Nathaniel Goodson and Dr. Vega Stovall. Finding revealed excessive dynamic airway collapse of appx 50% at distal trachea and it did not appear to be obstructive or limiting. \par \par CT Chest Dynamic on 2/28/23:\par - Marked collapse of the trachea is noted on the dynamic expiratory images, findings suggestive of tracheomalacia.\par - a 0.2 cm nodule in the anterior segment of the RLL is unchanged when compared to previous exam. \par \par I have reviewed the patient's medical records and diagnostic images at time of this office consultation and have made the following recommendation:\par 1. Patient is overall feeling well with no worsening shortness of breath or persistent cough. CT Dynamic findings reviewed. Recommend returning to clinic in 1 year with repeat scan to re-evaluate symptoms and findings. She is agreeable. \par 2. Cleared from Thoracic surgery standpoint for upcoming knee replacement. Cleared for intubation if needed. \par \par Recommendations reviewed with patient during this office visit, and all questions answered; Patient instructed on the importance of follow up and verbalizes understanding.\par \par I, NATHAN Rivas, personally performed the evaluation and management (E/M) services for this established patient. That E/M includes conducting the examination, assessing all new/exacerbated conditions, and establishing a new plan of care. Today, My ACP, Maddie Emery, was here to observe my evaluation and management services for this patient to be followed going forward.\par \par \par

## 2023-03-20 NOTE — HISTORY OF PRESENT ILLNESS
[FreeTextEntry1] : Ms. ANDREW AGUILAR, 52 year old female, never smoker, w/ hx of HLD, pertussis, GERD, recent diagnosis of SVT. She is status post Right VATS, robotic assisted, tracheobronchoplasty with Prolene mesh on 3/12/18 by Dr. Goodson. She was initially referred by Dr. Summers.\par \par CT CHEST on 8/18/2021:\par - No consolidations, edema, effusion or pneumothorax\par - On expiratory imaging there is near complete collapse of the posterior membrane of the trachea and bilateral mainstem bronchi, findings consistent with tracheobronchomalacia\par \par She is s/p Awake/Dynamic Bronch on 9/17/2021 by Dr. Nathaniel Goodson and Dr. Vega Stovall. Finding revealed excessive dynamic airway collapse of appx 50% at distal trachea and it did not appear to be obstructive or limiting. \par \par CT Chest Dynamic on 2/28/23:\par - Marked collapse of the trachea is noted on the dynamic expiratory images, findings suggestive of tracheomalacia.\par - a 0.2 cm nodule in the anterior segment of the RLL is unchanged when compared to previous exam. \par \par Patient is here today for a follow up. No cough, no reflux, no worsening shortness of breath with daily activity. Ambulating 6 miles daily. Upcoming right Knee replacement is scheduled. Ortho surgeon requesting Thoracic surgery Clearance. \par

## 2023-03-21 RX ORDER — SODIUM CHLORIDE 9 MG/ML
1000 INJECTION, SOLUTION INTRAVENOUS
Refills: 0 | Status: DISCONTINUED | OUTPATIENT
Start: 2023-03-24 | End: 2023-03-27

## 2023-03-21 RX ORDER — ONDANSETRON 8 MG/1
4 TABLET, FILM COATED ORAL EVERY 6 HOURS
Refills: 0 | Status: DISCONTINUED | OUTPATIENT
Start: 2023-03-24 | End: 2023-03-27

## 2023-03-21 RX ORDER — SENNA PLUS 8.6 MG/1
2 TABLET ORAL AT BEDTIME
Refills: 0 | Status: DISCONTINUED | OUTPATIENT
Start: 2023-03-24 | End: 2023-03-27

## 2023-03-21 RX ORDER — POLYETHYLENE GLYCOL 3350 17 G/17G
17 POWDER, FOR SOLUTION ORAL AT BEDTIME
Refills: 0 | Status: DISCONTINUED | OUTPATIENT
Start: 2023-03-24 | End: 2023-03-27

## 2023-03-21 RX ORDER — ACETAMINOPHEN 500 MG
1000 TABLET ORAL EVERY 8 HOURS
Refills: 0 | Status: DISCONTINUED | OUTPATIENT
Start: 2023-03-24 | End: 2023-03-27

## 2023-03-21 RX ORDER — HYDROMORPHONE HYDROCHLORIDE 2 MG/ML
0.5 INJECTION INTRAMUSCULAR; INTRAVENOUS; SUBCUTANEOUS
Refills: 0 | Status: DISCONTINUED | OUTPATIENT
Start: 2023-03-24 | End: 2023-03-27

## 2023-03-21 RX ORDER — MAGNESIUM HYDROXIDE 400 MG/1
30 TABLET, CHEWABLE ORAL DAILY
Refills: 0 | Status: DISCONTINUED | OUTPATIENT
Start: 2023-03-24 | End: 2023-03-27

## 2023-03-23 ENCOUNTER — TRANSCRIPTION ENCOUNTER (OUTPATIENT)
Age: 53
End: 2023-03-23

## 2023-03-24 ENCOUNTER — APPOINTMENT (OUTPATIENT)
Dept: ORTHOPEDIC SURGERY | Facility: HOSPITAL | Age: 53
End: 2023-03-24
Payer: COMMERCIAL

## 2023-03-24 ENCOUNTER — TRANSCRIPTION ENCOUNTER (OUTPATIENT)
Age: 53
End: 2023-03-24

## 2023-03-24 ENCOUNTER — INPATIENT (INPATIENT)
Facility: HOSPITAL | Age: 53
LOS: 2 days | Discharge: HOME | DRG: 470 | End: 2023-03-27
Attending: ORTHOPAEDIC SURGERY | Admitting: ORTHOPAEDIC SURGERY
Payer: COMMERCIAL

## 2023-03-24 VITALS
TEMPERATURE: 98 F | DIASTOLIC BLOOD PRESSURE: 76 MMHG | SYSTOLIC BLOOD PRESSURE: 109 MMHG | HEART RATE: 71 BPM | WEIGHT: 131.84 LBS | RESPIRATION RATE: 16 BRPM | OXYGEN SATURATION: 100 % | HEIGHT: 64 IN

## 2023-03-24 DIAGNOSIS — Z90.49 ACQUIRED ABSENCE OF OTHER SPECIFIED PARTS OF DIGESTIVE TRACT: Chronic | ICD-10-CM

## 2023-03-24 DIAGNOSIS — Z98.1 ARTHRODESIS STATUS: Chronic | ICD-10-CM

## 2023-03-24 DIAGNOSIS — Z98.890 OTHER SPECIFIED POSTPROCEDURAL STATES: Chronic | ICD-10-CM

## 2023-03-24 DIAGNOSIS — Z98.49 CATARACT EXTRACTION STATUS, UNSPECIFIED EYE: Chronic | ICD-10-CM

## 2023-03-24 DIAGNOSIS — M17.11 UNILATERAL PRIMARY OSTEOARTHRITIS, RIGHT KNEE: ICD-10-CM

## 2023-03-24 DIAGNOSIS — Z86.018 PERSONAL HISTORY OF OTHER BENIGN NEOPLASM: Chronic | ICD-10-CM

## 2023-03-24 DIAGNOSIS — Z98.51 TUBAL LIGATION STATUS: Chronic | ICD-10-CM

## 2023-03-24 LAB
ANION GAP SERPL CALC-SCNC: 8 MMOL/L — SIGNIFICANT CHANGE UP (ref 5–17)
BUN SERPL-MCNC: 17 MG/DL — SIGNIFICANT CHANGE UP (ref 7–23)
CALCIUM SERPL-MCNC: 9.1 MG/DL — SIGNIFICANT CHANGE UP (ref 8.4–10.5)
CHLORIDE SERPL-SCNC: 105 MMOL/L — SIGNIFICANT CHANGE UP (ref 96–108)
CO2 SERPL-SCNC: 30 MMOL/L — SIGNIFICANT CHANGE UP (ref 22–31)
CREAT SERPL-MCNC: 0.64 MG/DL — SIGNIFICANT CHANGE UP (ref 0.5–1.3)
EGFR: 105 ML/MIN/1.73M2 — SIGNIFICANT CHANGE UP
GLUCOSE SERPL-MCNC: 104 MG/DL — HIGH (ref 70–99)
HCT VFR BLD CALC: 37.2 % — SIGNIFICANT CHANGE UP (ref 34.5–45)
HGB BLD-MCNC: 12.4 G/DL — SIGNIFICANT CHANGE UP (ref 11.5–15.5)
MCHC RBC-ENTMCNC: 30.2 PG — SIGNIFICANT CHANGE UP (ref 27–34)
MCHC RBC-ENTMCNC: 33.3 GM/DL — SIGNIFICANT CHANGE UP (ref 32–36)
MCV RBC AUTO: 90.7 FL — SIGNIFICANT CHANGE UP (ref 80–100)
NRBC # BLD: 0 /100 WBCS — SIGNIFICANT CHANGE UP (ref 0–0)
PLATELET # BLD AUTO: 270 K/UL — SIGNIFICANT CHANGE UP (ref 150–400)
POTASSIUM SERPL-MCNC: 4.7 MMOL/L — SIGNIFICANT CHANGE UP (ref 3.5–5.3)
POTASSIUM SERPL-SCNC: 4.7 MMOL/L — SIGNIFICANT CHANGE UP (ref 3.5–5.3)
RBC # BLD: 4.1 M/UL — SIGNIFICANT CHANGE UP (ref 3.8–5.2)
RBC # FLD: 13 % — SIGNIFICANT CHANGE UP (ref 10.3–14.5)
SODIUM SERPL-SCNC: 143 MMOL/L — SIGNIFICANT CHANGE UP (ref 135–145)
WBC # BLD: 12.13 K/UL — HIGH (ref 3.8–10.5)
WBC # FLD AUTO: 12.13 K/UL — HIGH (ref 3.8–10.5)

## 2023-03-24 PROCEDURE — 20985 CPTR-ASST DIR MS PX: CPT

## 2023-03-24 PROCEDURE — 73560 X-RAY EXAM OF KNEE 1 OR 2: CPT | Mod: 26,RT

## 2023-03-24 PROCEDURE — 27447 TOTAL KNEE ARTHROPLASTY: CPT | Mod: RT

## 2023-03-24 PROCEDURE — 20610 DRAIN/INJ JOINT/BURSA W/O US: CPT | Mod: 59,RT

## 2023-03-24 DEVICE — MAKO BONE PIN 4MM X 80MM: Type: IMPLANTABLE DEVICE | Site: RIGHT | Status: FUNCTIONAL

## 2023-03-24 DEVICE — TRIATHLON TIBIAL COMP SZ 3: Type: IMPLANTABLE DEVICE | Site: RIGHT | Status: FUNCTIONAL

## 2023-03-24 DEVICE — INSERT TIB BEARING TRIATHLON CS X3 SZ 3 9MM: Type: IMPLANTABLE DEVICE | Site: RIGHT | Status: FUNCTIONAL

## 2023-03-24 DEVICE — COMP FEM CR CMNTLSS BEADED W/ PA SZ 4 RT: Type: IMPLANTABLE DEVICE | Site: RIGHT | Status: FUNCTIONAL

## 2023-03-24 DEVICE — PATELLA ASYMM TRIATHLON SZ A 32X10MM: Type: IMPLANTABLE DEVICE | Site: RIGHT | Status: FUNCTIONAL

## 2023-03-24 DEVICE — MAKO BONE PIN 4MM X 140MM: Type: IMPLANTABLE DEVICE | Site: RIGHT | Status: FUNCTIONAL

## 2023-03-24 RX ORDER — OXYCODONE HYDROCHLORIDE 5 MG/1
1 TABLET ORAL
Qty: 42 | Refills: 0
Start: 2023-03-24 | End: 2023-03-30

## 2023-03-24 RX ORDER — HYDROMORPHONE HYDROCHLORIDE 2 MG/ML
0.5 INJECTION INTRAMUSCULAR; INTRAVENOUS; SUBCUTANEOUS
Refills: 0 | Status: DISCONTINUED | OUTPATIENT
Start: 2023-03-24 | End: 2023-03-24

## 2023-03-24 RX ORDER — APREPITANT 80 MG/1
40 CAPSULE ORAL ONCE
Refills: 0 | Status: COMPLETED | OUTPATIENT
Start: 2023-03-24 | End: 2023-03-24

## 2023-03-24 RX ORDER — CEFAZOLIN SODIUM 1 G
2000 VIAL (EA) INJECTION ONCE
Refills: 0 | Status: COMPLETED | OUTPATIENT
Start: 2023-03-24 | End: 2023-03-24

## 2023-03-24 RX ORDER — LEVOTHYROXINE SODIUM 125 MCG
25 TABLET ORAL DAILY
Refills: 0 | Status: DISCONTINUED | OUTPATIENT
Start: 2023-03-25 | End: 2023-03-27

## 2023-03-24 RX ORDER — SODIUM CHLORIDE 9 MG/ML
1000 INJECTION, SOLUTION INTRAVENOUS
Refills: 0 | Status: DISCONTINUED | OUTPATIENT
Start: 2023-03-24 | End: 2023-03-24

## 2023-03-24 RX ORDER — TRANEXAMIC ACID 100 MG/ML
1000 INJECTION, SOLUTION INTRAVENOUS ONCE
Refills: 0 | Status: COMPLETED | OUTPATIENT
Start: 2023-03-24 | End: 2023-03-24

## 2023-03-24 RX ORDER — FAMOTIDINE 10 MG/ML
20 INJECTION INTRAVENOUS DAILY
Refills: 0 | Status: DISCONTINUED | OUTPATIENT
Start: 2023-03-24 | End: 2023-03-27

## 2023-03-24 RX ORDER — OXYCODONE HYDROCHLORIDE 5 MG/1
0 TABLET ORAL
Qty: 42 | Refills: 0 | DISCHARGE
Start: 2023-03-24 | End: 2023-03-30

## 2023-03-24 RX ORDER — OXYCODONE HYDROCHLORIDE 5 MG/1
10 TABLET ORAL
Refills: 0 | Status: DISCONTINUED | OUTPATIENT
Start: 2023-03-24 | End: 2023-03-25

## 2023-03-24 RX ORDER — PANTOPRAZOLE SODIUM 20 MG/1
40 TABLET, DELAYED RELEASE ORAL EVERY 12 HOURS
Refills: 0 | Status: DISCONTINUED | OUTPATIENT
Start: 2023-03-24 | End: 2023-03-27

## 2023-03-24 RX ORDER — CEFAZOLIN SODIUM 1 G
2000 VIAL (EA) INJECTION EVERY 8 HOURS
Refills: 0 | Status: COMPLETED | OUTPATIENT
Start: 2023-03-24 | End: 2023-03-25

## 2023-03-24 RX ORDER — ASPIRIN/CALCIUM CARB/MAGNESIUM 324 MG
1 TABLET ORAL
Qty: 56 | Refills: 0
Start: 2023-03-24 | End: 2023-04-20

## 2023-03-24 RX ORDER — CHLORHEXIDINE GLUCONATE 213 G/1000ML
1 SOLUTION TOPICAL ONCE
Refills: 0 | Status: COMPLETED | OUTPATIENT
Start: 2023-03-24 | End: 2023-03-24

## 2023-03-24 RX ORDER — ACETAMINOPHEN 500 MG
2 TABLET ORAL
Qty: 0 | Refills: 0 | DISCHARGE
Start: 2023-03-24

## 2023-03-24 RX ORDER — NALOXONE HYDROCHLORIDE 4 MG/.1ML
1 SPRAY NASAL
Qty: 1 | Refills: 0
Start: 2023-03-24 | End: 2023-03-24

## 2023-03-24 RX ORDER — CELECOXIB 200 MG/1
200 CAPSULE ORAL EVERY 12 HOURS
Refills: 0 | Status: DISCONTINUED | OUTPATIENT
Start: 2023-03-24 | End: 2023-03-24

## 2023-03-24 RX ORDER — ACETAMINOPHEN 500 MG
1000 TABLET ORAL ONCE
Refills: 0 | Status: COMPLETED | OUTPATIENT
Start: 2023-03-24 | End: 2023-03-24

## 2023-03-24 RX ORDER — HYDROMORPHONE HYDROCHLORIDE 2 MG/ML
1 INJECTION INTRAMUSCULAR; INTRAVENOUS; SUBCUTANEOUS
Refills: 0 | Status: DISCONTINUED | OUTPATIENT
Start: 2023-03-24 | End: 2023-03-24

## 2023-03-24 RX ORDER — CELECOXIB 200 MG/1
1 CAPSULE ORAL
Qty: 56 | Refills: 0
Start: 2023-03-24 | End: 2023-04-20

## 2023-03-24 RX ORDER — PANTOPRAZOLE SODIUM 20 MG/1
40 TABLET, DELAYED RELEASE ORAL
Refills: 0 | Status: DISCONTINUED | OUTPATIENT
Start: 2023-03-24 | End: 2023-03-24

## 2023-03-24 RX ORDER — ONDANSETRON 8 MG/1
4 TABLET, FILM COATED ORAL ONCE
Refills: 0 | Status: DISCONTINUED | OUTPATIENT
Start: 2023-03-24 | End: 2023-03-24

## 2023-03-24 RX ORDER — TEMAZEPAM 15 MG/1
30 CAPSULE ORAL AT BEDTIME
Refills: 0 | Status: DISCONTINUED | OUTPATIENT
Start: 2023-03-24 | End: 2023-03-27

## 2023-03-24 RX ORDER — ALPRAZOLAM 0.25 MG
0.5 TABLET ORAL THREE TIMES A DAY
Refills: 0 | Status: DISCONTINUED | OUTPATIENT
Start: 2023-03-24 | End: 2023-03-27

## 2023-03-24 RX ORDER — OXYCODONE HYDROCHLORIDE 5 MG/1
5 TABLET ORAL
Refills: 0 | Status: DISCONTINUED | OUTPATIENT
Start: 2023-03-24 | End: 2023-03-25

## 2023-03-24 RX ORDER — ASPIRIN/CALCIUM CARB/MAGNESIUM 324 MG
81 TABLET ORAL EVERY 12 HOURS
Refills: 0 | Status: DISCONTINUED | OUTPATIENT
Start: 2023-03-25 | End: 2023-03-27

## 2023-03-24 RX ORDER — OXYCODONE HYDROCHLORIDE 5 MG/1
5 TABLET ORAL ONCE
Refills: 0 | Status: DISCONTINUED | OUTPATIENT
Start: 2023-03-24 | End: 2023-03-24

## 2023-03-24 RX ADMIN — PANTOPRAZOLE SODIUM 40 MILLIGRAM(S): 20 TABLET, DELAYED RELEASE ORAL at 17:20

## 2023-03-24 RX ADMIN — OXYCODONE HYDROCHLORIDE 10 MILLIGRAM(S): 5 TABLET ORAL at 17:29

## 2023-03-24 RX ADMIN — ONDANSETRON 4 MILLIGRAM(S): 8 TABLET, FILM COATED ORAL at 23:20

## 2023-03-24 RX ADMIN — OXYCODONE HYDROCHLORIDE 10 MILLIGRAM(S): 5 TABLET ORAL at 20:00

## 2023-03-24 RX ADMIN — SENNA PLUS 2 TABLET(S): 8.6 TABLET ORAL at 21:11

## 2023-03-24 RX ADMIN — Medication 400 MILLIGRAM(S): at 15:04

## 2023-03-24 RX ADMIN — OXYCODONE HYDROCHLORIDE 10 MILLIGRAM(S): 5 TABLET ORAL at 20:56

## 2023-03-24 RX ADMIN — Medication 1000 MILLIGRAM(S): at 21:10

## 2023-03-24 RX ADMIN — APREPITANT 40 MILLIGRAM(S): 80 CAPSULE ORAL at 07:33

## 2023-03-24 RX ADMIN — Medication 1000 MILLIGRAM(S): at 21:12

## 2023-03-24 RX ADMIN — Medication 100 MILLIGRAM(S): at 16:42

## 2023-03-24 RX ADMIN — OXYCODONE HYDROCHLORIDE 10 MILLIGRAM(S): 5 TABLET ORAL at 16:42

## 2023-03-24 RX ADMIN — FAMOTIDINE 20 MILLIGRAM(S): 10 INJECTION INTRAVENOUS at 17:20

## 2023-03-24 RX ADMIN — CHLORHEXIDINE GLUCONATE 1 APPLICATION(S): 213 SOLUTION TOPICAL at 07:34

## 2023-03-24 RX ADMIN — Medication 1000 MILLIGRAM(S): at 16:41

## 2023-03-24 NOTE — DISCHARGE NOTE PROVIDER - HOSPITAL COURSE
This patient was admitted to [facility] with a history of severe degenerative joint disease of the right knee.  Patient underwent Pre-Surgical Testing and was medically cleared to undergo elective procedure. Patient underwent right TKR by Dr. Edinson Read on 3/24/23. Procedure was well tolerated.  No operative or sabine-operative complications arose during patient's hospital course.  Patient received antibiotic according to SCIP guidelines for infection prevention.  Aspirin 81mg q 12 h was given for DVT prophylaxis, in addition to the use of SCDs.  Anesthesia, Medical Hospitalist, Physical Therapy and Occupational Therapy were consulted. Patient is stable for discharge with a good prognosis.  Appropriate discharge instructions and medications are provided in this document. This patient was admitted to [facility] with a history of severe degenerative joint disease of the right knee.  Patient underwent Pre-Surgical Testing and was medically cleared to undergo elective procedure. Patient underwent right TKR by Dr. Edinson Read on 3/24/23 under regional Anesthesia. Procedure was well tolerated.  No operative or sabine-operative complications arose during patient's hospital course.  Patient received antibiotic according to SCIP guidelines for infection prevention.  Aspirin 81mg q 12 h was given for DVT prophylaxis, in addition to the use of SCDs.  Anesthesia, Medical Hospitalist, Physical Therapy and Occupational Therapy were consulted. Labs followed by Hospitalist and Orth team. Stable Neurovascular exam of LE Post-Op. RUDDY dressing clean, dry, intact. Patient is stable for discharge with a good prognosis.  Appropriate discharge instructions and medications are provided in this document. This patient was admitted to [facility] with a history of severe degenerative joint disease of the right knee.  Patient underwent Pre-Surgical Testing and was medically cleared to undergo elective procedure. Patient underwent right total knee arthroplasty by Dr. Edinson Read on 3/24/23 under regional Anesthesia. Procedure was well tolerated.  No operative or sabine-operative complications arose during patient's hospital course.  Patient received antibiotic according to SCIP guidelines for infection prevention.  Aspirin 81mg q 12 h was given for DVT prophylaxis, in addition to the use of SCDs.  Anesthesia, Medical Hospitalist, Physical Therapy and Occupational Therapy were consulted. Labs followed by Hospitalist and Orth team. Stable Neurovascular exam of LE Post-Op. RUDDY dressing clean, dry, intact. Patient is stable for discharge with a good prognosis.  Appropriate discharge instructions and medications are provided in this document. This patient was admitted to [facility] with a history of severe degenerative joint disease of the right knee.  Patient underwent Pre-Surgical   Testing and was medically cleared to undergo elective procedure. Patient underwent right total knee arthroplasty by Dr. Edinson Read on 3/24/23   under regional Anesthesia. Procedure was well tolerated.  No operative or sabine-operative complications arose during patient's hospital course.  Patient   received antibiotic according to SCIP guidelines for infection prevention.  Aspirin 81mg q 12 h was given for DVT prophylaxis, in addition to the use of   SCDs.  Anesthesia, Medical Hospitalist, Physical Therapy and Occupational Therapy were consulted. Labs followed by Hospitalist and Orth team. Stable   Neurovascular exam of LE Post-Op. RUDDY dressing clean, dry, intact. Patient had low pain threshold and had difficulty ambulating and ranging Right   knee. Patient is stable for Home discharge with Home Care / Home PT with a good prognosis.  Appropriate discharge instructions and medications   are provided in this document.

## 2023-03-24 NOTE — CONSULT NOTE ADULT - PROBLEM SELECTOR RECOMMENDATION 9
Patient is s/p right total knee arthroplasty, post op day # 0.  continue perioperative antibiotics per protocol.  Continue IV hydration per routine.  On 8  patient will be started on ASA for DVT prophylaxis.  Continue multimodal pain management with Tylenol/Oxycodone as needed and Dilaudid as needed.   physical therapy evaluation for ambulation and discharge planning  Encourage incentive spirometry.  Monitor for post op anemia  and post op fever.   further management as per patient's clinical course.

## 2023-03-24 NOTE — DISCHARGE NOTE NURSING/CASE MANAGEMENT/SOCIAL WORK - NSDCPEFALRISK_GEN_ALL_CORE
For information on Fall & Injury Prevention, visit: [email or web address] OR  [email or web address] OR  https://www.cdc.gov/steadi/patient.html

## 2023-03-24 NOTE — DISCHARGE NOTE PROVIDER - CARE PROVIDER_API CALL
Bogdan Read)  Orthopaedic Surgery  [address] [place] [address]  Phone: [phone]  Fax: [phone]  Established Patient  Scheduled Appointment: 04/11/2023 10:45 AM

## 2023-03-24 NOTE — CONSULT NOTE ADULT - PROBLEM SELECTOR RECOMMENDATION 5
continue patient on temazepam as needed.  Patient is encouraged to wean off temazepam  and is advised of increased risk of respiratory depression with benzodiazepines and narcotics.

## 2023-03-24 NOTE — DISCHARGE NOTE PROVIDER - PROVIDER TOKENS
PROVIDER:[TOKEN:[[id number]],SCHEDULEDAPPT:[04/11/2023],SCHEDULEDAPPTTIME:[10:45 AM],ESTABLISHEDPATIENT:[T]]

## 2023-03-24 NOTE — CONSULT NOTE ADULT - PROBLEM SELECTOR RECOMMENDATION 4
continue patient on Xanax as needed.  Patient is advised increase risk of respiratory depression with narcotic pain medication usage.

## 2023-03-24 NOTE — CONSULT NOTE ADULT - SUBJECTIVE AND OBJECTIVE BOX
Patient is a 52y old  Female who presents with a chief complaint of Right TKR for severe right knee OA.  (24 Mar 2023 12:11)      HPI:        PAST MEDICAL & SURGICAL HISTORY:  Hypothyroidism      IBS (irritable bowel syndrome)      Anxiety      Low serum vitamin D      Allergic rhinitis, seasonal      GERD (gastroesophageal reflux disease)      Esophageal dysmotility      HLD (hyperlipidemia)      Osteoarthritis of right knee      SVT (supraventricular tachycardia)      History of hyperkalemia      History of abdominoplasty  2012, mini      History of parotidectomy  left      History of carpal tunnel release  left      H/O arthroscopy of right knee      Status post arthroscopy of hip  left      History of cataract surgery  bilateral      H/O bilateral breast reduction surgery      History of lipoma  removed from left shoulder      H/O tubal ligation  bilateral      History of bladder surgery  sling      Status post right breast lumpectomy  1996      S/P tracheoplasty  2018 can use size 7 ET tube &quot;I have been told I am totally fine now&quot;      H/O parotidectomy      H/O shoulder surgery  left      S/P cervical spinal fusion          Allergies    codeine (Urticaria)  Hycodan (Rash)  penicillin (Rash)  prednisoLONE (Swelling; Other)    Intolerances    atorvastatin (Muscle Pain)  niacin (Flushing)  rosuvastatin (Muscle Pain)  Vicodin (Pruritus)      Home Medications:  levothyroxine 25 mcg (0.025 mg) oral tablet: 1 tab(s) orally once a day (24 Mar 2023 07:53)  Linzess 290 mcg oral capsule: 1 cap(s) orally once a day (24 Mar 2023 07:53)  Ozempic 2 mg/1.5 mL (0.25 mg or 0.5 mg dose) subcutaneous solution: subcutaneous once a week (24 Mar 2023 07:53)  pantoprazole 40 mg oral granule, delayed release: 1 each orally 2 times a day (24 Mar 2023 07:53)  Pepcid 20 mg oral tablet: 1 tab(s) orally 2 times a day (24 Mar 2023 07:53)  Praluent Pen 75 mg/mL subcutaneous solution: subcutaneous 2 times a month (24 Mar 2023 07:53)  temazepam 30 mg oral capsule: 1 cap(s) orally once a day (at bedtime) (24 Mar 2023 07:53)  Xanax 0.5 mg oral tablet: 1 tab(s) orally 3 times a day, As Needed (24 Mar 2023 07:53)      MEDICATIONS  (STANDING):  lactated ringers. 1000 milliLiter(s) (75 mL/Hr) IV Continuous <Continuous>    MEDICATIONS  (PRN):  HYDROmorphone  Injectable 0.5 milliGRAM(s) IV Push every 10 minutes PRN Moderate Pain (4 - 6)  HYDROmorphone  Injectable 1 milliGRAM(s) IV Push every 10 minutes PRN Severe Pain (7 - 10)  ondansetron Injectable 4 milliGRAM(s) IV Push once PRN Nausea and/or Vomiting  oxyCODONE    IR 5 milliGRAM(s) Oral once PRN Moderate Pain (4 - 6)      FAMILY HISTORY:  Family history of heart disease (Father)    Family history of diabetes mellitus (Father, Mother)    Family history of hypertension (Father)    Family history of DVT (Mother)        Social History:     REVIEW OF SYSTEMS:  CONSTITUTIONAL: No fever or chills.   EYES: No eye pain or discharge  ENMT: No sinus or throat pain  NECK: No pain or stiffness  BREASTS: No pain, masses, or nipple discharge  RESPIRATORY: No cough or shortness of breath  CARDIOVASCULAR: No chest pain, palpitations, dizziness.   GASTROINTESTINAL: No abdominal or epigastric pain. No nausea or vomiting.  GENITOURINARY: No dysuria, hematuria, or incontinence  NEUROLOGICAL: No headaches,  numbness, or tremors  SKIN: No itching, burning, rashes, or lesions   LYMPH NODES: No enlarged glands  ENDOCRINE: No polydipsia or polyuria  MUSCULOSKELETAL: No joint pain or swelling;  PSYCHIATRIC: No difficulty sleeping  HEME/LYMPH: No easy bruising, or bleeding gums  ALLERGY AND IMMUNOLOGIC: No hives or eczema    Vital Signs Last 24 Hrs  T(C): 36.1 (24 Mar 2023 11:21), Max: 36.4 (24 Mar 2023 07:51)  T(F): 97 (24 Mar 2023 11:21), Max: 97.5 (24 Mar 2023 07:51)  HR: 62 (24 Mar 2023 12:50) (59 - 71)  BP: 93/63 (24 Mar 2023 12:50) (93/63 - 115/84)  BP(mean): --  RR: 12 (24 Mar 2023 12:50) (10 - 16)  SpO2: 99% (24 Mar 2023 12:50) (96% - 100%)    Parameters below as of 24 Mar 2023 11:21  Patient On (Oxygen Delivery Method): room air        PHYSICAL EXAM:  GENERAL: NAD, well-groomed, well-developed  HEAD:  Atraumatic, Normocephalic  EYES:  Pallor +  ENMT: Moist mucous membranes, no lesions  NECK: Supple, No JVD, Normal thyroid  CHEST/LUNG: Decreased breath sounds at bases, rest is clear.   HEART: Regular rate and rhythm; No murmurs, rubs, or gallops  ABDOMEN: Soft, Nontender, Nondistended; Bowel sounds present  EXTREMITIES:  Pulses +  LYMPH: No lymphadenopathy noted  SKIN: No rashes or lesions  NERVOUS SYSTEM:  No focal deficit.   PSYCH:  Awake and alert.    LABS:              CAPILLARY BLOOD GLUCOSE            RADIOLOGY & ADDITIONAL TESTS:    Imaging Personally Reviewed:  [X] YES  [ ] NO   Patient is a 52y old  Female who presents with a chief complaint of Right knee pain.       HPI: 52 years old female with past medical history of hypothyroidism, hyperlipidemia,  tracheomalacia, IBS, anxiety, insomnia, allergic rhinitis, GERD, esophageal dysmotility, SVT        PAST MEDICAL & SURGICAL HISTORY:  Hypothyroidism    IBS (irritable bowel syndrome)    Anxiety    Low serum vitamin D    Allergic rhinitis, seasonal    GERD (gastroesophageal reflux disease)    Esophageal dysmotility    HLD (hyperlipidemia)    Osteoarthritis of right knee    SVT (supraventricular tachycardia)    History of hyperkalemia    History of abdominoplasty  2012, mini    History of parotidectomy  left    History of carpal tunnel release  left    H/O arthroscopy of right knee    Status post arthroscopy of hip  left    History of cataract surgery  bilateral    H/O bilateral breast reduction surgery    History of lipoma  removed from left shoulder    H/O tubal ligation  bilateral    History of bladder surgery  sling    Status post right breast lumpectomy  1996    S/P tracheoplasty  2018 can use size 7 ET tube &quot;I have been told I am totally fine now&quot;    H/O parotidectomy    H/O shoulder surgery  left    S/P cervical spinal fusion    Allergies:  codeine (Urticaria)  Hycodan (Rash)  penicillin (Rash)  prednisoLONE (Swelling; Other)    Intolerances    atorvastatin (Muscle Pain)  niacin (Flushing)  rosuvastatin (Muscle Pain)  Vicodin (Pruritus)      Home Medications:  levothyroxine 25 mcg (0.025 mg) oral tablet: 1 tab(s) orally once a day (24 Mar 2023 07:53)  Linzess 290 mcg oral capsule: 1 cap(s) orally once a day (24 Mar 2023 07:53)  Ozempic 2 mg/1.5 mL (0.25 mg or 0.5 mg dose) subcutaneous solution: subcutaneous once a week (24 Mar 2023 07:53)  pantoprazole 40 mg oral granule, delayed release: 1 each orally 2 times a day (24 Mar 2023 07:53)  Pepcid 20 mg oral tablet: 1 tab(s) orally 2 times a day (24 Mar 2023 07:53)  Praluent Pen 75 mg/mL subcutaneous solution: subcutaneous 2 times a month (24 Mar 2023 07:53)  temazepam 30 mg oral capsule: 1 cap(s) orally once a day (at bedtime) (24 Mar 2023 07:53)  Xanax 0.5 mg oral tablet: 1 tab(s) orally 3 times a day, As Needed (24 Mar 2023 07:53)      MEDICATIONS  (STANDING):  lactated ringers. 1000 milliLiter(s) (75 mL/Hr) IV Continuous <Continuous>    MEDICATIONS  (PRN):  HYDROmorphone  Injectable 0.5 milliGRAM(s) IV Push every 10 minutes PRN Moderate Pain (4 - 6)  HYDROmorphone  Injectable 1 milliGRAM(s) IV Push every 10 minutes PRN Severe Pain (7 - 10)  ondansetron Injectable 4 milliGRAM(s) IV Push once PRN Nausea and/or Vomiting  oxyCODONE    IR 5 milliGRAM(s) Oral once PRN Moderate Pain (4 - 6)      FAMILY HISTORY:  Family history of heart disease (Father)    Family history of diabetes mellitus (Father, Mother)    Family history of hypertension (Father)    Family history of DVT (Mother)        Social History:     REVIEW OF SYSTEMS:  CONSTITUTIONAL: No fever or chills.   EYES: No eye pain or discharge  ENMT: No sinus or throat pain  NECK: No pain or stiffness  BREASTS: No pain, masses, or nipple discharge  RESPIRATORY: No cough or shortness of breath  CARDIOVASCULAR: No chest pain, palpitations, dizziness.   GASTROINTESTINAL: No abdominal or epigastric pain. No nausea or vomiting.  GENITOURINARY: No dysuria, hematuria, or incontinence  NEUROLOGICAL: No headaches,  numbness, or tremors  SKIN: No itching, burning, rashes, or lesions   LYMPH NODES: No enlarged glands  ENDOCRINE: No polydipsia or polyuria  MUSCULOSKELETAL: No joint pain or swelling;  PSYCHIATRIC: No difficulty sleeping  HEME/LYMPH: No easy bruising, or bleeding gums  ALLERGY AND IMMUNOLOGIC: No hives or eczema    Vital Signs Last 24 Hrs  T(C): 36.1 (24 Mar 2023 11:21), Max: 36.4 (24 Mar 2023 07:51)  T(F): 97 (24 Mar 2023 11:21), Max: 97.5 (24 Mar 2023 07:51)  HR: 62 (24 Mar 2023 12:50) (59 - 71)  BP: 93/63 (24 Mar 2023 12:50) (93/63 - 115/84)  BP(mean): --  RR: 12 (24 Mar 2023 12:50) (10 - 16)  SpO2: 99% (24 Mar 2023 12:50) (96% - 100%)    Parameters below as of 24 Mar 2023 11:21  Patient On (Oxygen Delivery Method): room air        PHYSICAL EXAM:  GENERAL: NAD, well-groomed, well-developed  HEAD:  Atraumatic, Normocephalic  EYES:  Pallor +  ENMT: Moist mucous membranes, no lesions  NECK: Supple, No JVD, Normal thyroid  CHEST/LUNG: Decreased breath sounds at bases, rest is clear.   HEART: Regular rate and rhythm; No murmurs, rubs, or gallops  ABDOMEN: Soft, Nontender, Nondistended; Bowel sounds present  EXTREMITIES:  Pulses +  LYMPH: No lymphadenopathy noted  SKIN: No rashes or lesions  NERVOUS SYSTEM:  No focal deficit.   PSYCH:  Awake and alert.    LABS:              CAPILLARY BLOOD GLUCOSE            RADIOLOGY & ADDITIONAL TESTS:    Imaging Personally Reviewed:  [X] YES  [ ] NO   Patient is a 52y old  Female who presents with a chief complaint of Right knee pain.       HPI: 52 years old female with past medical history of hypothyroidism, hyperlipidemia,  tracheomalacia, IBS, anxiety, insomnia, allergic rhinitis, GERD, esophageal dysmotility, SVT, vitamin D deficiency, history of arthroscopic surgery of the right knee, osteoarthritis of the knee, who is been having increasing pain in her right knee.  Patient had failed outpatient treatment with conservative measures.  She was recommended right total knee arthroplasty.  Patient is seen postoperatively for medical comanagement.    Outpatient records are reviewed.  Patient complains of some right knee discomfort.  She denies any chest pain or chest pressure.  She denies any nausea or vomiting.  She denies any fevers or chills.    PAST MEDICAL & SURGICAL HISTORY:  Hypothyroidism    IBS (irritable bowel syndrome)    Anxiety    Low serum vitamin D    Allergic rhinitis, seasonal    GERD (gastroesophageal reflux disease)    Esophageal dysmotility    HLD (hyperlipidemia)    Osteoarthritis of right knee    SVT (supraventricular tachycardia)    History of hyperkalemia    History of abdominoplasty  2012, mini    History of parotidectomy  left    History of carpal tunnel release  left    H/O arthroscopy of right knee    Status post arthroscopy of hip  left    History of cataract surgery  bilateral    H/O bilateral breast reduction surgery    History of lipoma  removed from left shoulder    H/O tubal ligation  bilateral    History of bladder surgery  sling    Status post right breast lumpectomy  1996    S/P tracheoplasty  2018 can use size 7 ET tube &quot;I have been told I am totally fine now&quot;    H/O parotidectomy    H/O shoulder surgery  left    S/P cervical spinal fusion    Allergies:  codeine (Urticaria)  Hycodan (Rash)  penicillin (Rash)  prednisoLONE (Swelling; Other)    Intolerances    atorvastatin (Muscle Pain)  niacin (Flushing)  rosuvastatin (Muscle Pain)  Vicodin (Pruritus)      Home Medications:  levothyroxine 25 mcg (0.025 mg) oral tablet: 1 tab(s) orally once a day (24 Mar 2023 07:53)  Linzess 290 mcg oral capsule: 1 cap(s) orally once a day (24 Mar 2023 07:53)  Ozempic 2 mg/1.5 mL (0.25 mg or 0.5 mg dose) subcutaneous solution: subcutaneous once a week (24 Mar 2023 07:53)  pantoprazole 40 mg oral granule, delayed release: 1 each orally 2 times a day (24 Mar 2023 07:53)  Pepcid 20 mg oral tablet: 1 tab(s) orally 2 times a day (24 Mar 2023 07:53)  Praluent Pen 75 mg/mL subcutaneous solution: subcutaneous 2 times a month (24 Mar 2023 07:53)  temazepam 30 mg oral capsule: 1 cap(s) orally once a day (at bedtime) (24 Mar 2023 07:53)  Xanax 0.5 mg oral tablet: 1 tab(s) orally 3 times a day, As Needed (24 Mar 2023 07:53)      MEDICATIONS  (STANDING):  lactated ringers. 1000 milliLiter(s) (75 mL/Hr) IV Continuous <Continuous>    MEDICATIONS  (PRN):  HYDROmorphone  Injectable 0.5 milliGRAM(s) IV Push every 10 minutes PRN Moderate Pain (4 - 6)  HYDROmorphone  Injectable 1 milliGRAM(s) IV Push every 10 minutes PRN Severe Pain (7 - 10)  ondansetron Injectable 4 milliGRAM(s) IV Push once PRN Nausea and/or Vomiting  oxyCODONE    IR 5 milliGRAM(s) Oral once PRN Moderate Pain (4 - 6)      FAMILY HISTORY:  Family history of heart disease (Father)    Family history of diabetes mellitus (Father, Mother)    Family history of hypertension (Father)    Family history of DVT (Mother)    Social History: patient denies any smoking.  She drinks rarely.  Patient is , lives with family.    REVIEW OF SYSTEMS:  CONSTITUTIONAL: No fever or chills.   EYES: No eye pain or discharge  ENMT: No sinus or throat pain  NECK: No pain or stiffness  BREASTS: No pain, masses, or nipple discharge  RESPIRATORY: No cough or shortness of breath  CARDIOVASCULAR: No chest pain, palpitations, dizziness.   GASTROINTESTINAL: No abdominal or epigastric pain. No nausea or vomiting.  GENITOURINARY: No dysuria, hematuria, or incontinence  NEUROLOGICAL: No headaches,  numbness, or tremors  SKIN: No itching, burning, rashes, or lesions   LYMPH NODES: No enlarged glands  ENDOCRINE: No polydipsia or polyuria  MUSCULOSKELETAL: positive for right knee pain.  PSYCHIATRIC: No difficulty sleeping  HEME/LYMPH: No easy bruising, or bleeding gums  ALLERGY AND IMMUNOLOGIC: No hives or eczema    Vital Signs Last 24 Hrs  T(C): 36.1 (24 Mar 2023 11:21), Max: 36.4 (24 Mar 2023 07:51)  T(F): 97 (24 Mar 2023 11:21), Max: 97.5 (24 Mar 2023 07:51)  HR: 62 (24 Mar 2023 12:50) (59 - 71)  BP: 93/63 (24 Mar 2023 12:50) (93/63 - 115/84)  BP(mean): --  RR: 12 (24 Mar 2023 12:50) (10 - 16)  SpO2: 99% (24 Mar 2023 12:50) (96% - 100%)    Parameters below as of 24 Mar 2023 11:21  Patient On (Oxygen Delivery Method): room air        PHYSICAL EXAM:  GENERAL: NAD, well-groomed, well-developed  HEAD:  Atraumatic, Normocephalic  EYES:  Pallor +  ENMT: Moist mucous membranes, no lesions  NECK: Supple, No JVD, Normal thyroid  CHEST/LUNG: Decreased breath sounds at bases, rest is clear.   HEART: Regular rate and rhythm; No murmurs, rubs, or gallops  ABDOMEN: Soft, Nontender, Nondistended; Bowel sounds present  EXTREMITIES: right knee dressing is noted.  No calf tenderness noted.  Pulses +  LYMPH: No lymphadenopathy noted  SKIN: No rashes or lesions  NERVOUS SYSTEM:  patient still has some residual effect from her spinal anesthesia in her lower extremities.  PSYCH:  Awake and alert.    LABS:    Complete Blood Count (03.07.23 @ 10:09)   Nucleated RBC: 0 /100 WBCs  WBC Count: 4.08 K/uL  RBC Count: 4.29 M/uL  Hemoglobin: 13.1 g/dL  Hematocrit: 39.8 %  Mean Cell Volume: 92.8 fl  Mean Cell Hemoglobin: 30.5 pg  Mean Cell Hemoglobin Conc: 32.9 gm/dL  Red Cell Distrib Width: 13.2 %  Platelet Count - Automated: 271 K/uL  Comprehensive Metabolic Panel (03.07.23 @ 10:09)   Sodium, Serum: 141 mmol/L  Potassium, Serum: 5.3 mmol/L  Chloride, Serum: 104 mmol/L  Carbon Dioxide, Serum: 32 mmol/L  Anion Gap, Serum: 5 mmol/L  Blood Urea Nitrogen, Serum: 16 mg/dL  Creatinine, Serum: 0.62 mg/dL  Glucose, Serum: 91 mg/dL  Calcium, Total Serum: 9.4 mg/dL  Protein Total, Serum: 7.2 g/dL  Albumin, Serum: 4.0 g/dL  Bilirubin Total, Serum: 0.4 mg/dL  Alkaline Phosphatase, Serum: 61 U/L  Aspartate Aminotransferase (AST/SGOT): 27 U/L  Alanine Aminotransferase (ALT/SGPT): 44 U/L DA    A1C with Estimated Average Glucose (03.07.23 @ 10:09)   A1C with Estimated Average Glucose Result: 5.4 %  Estimated Average Glucose: 108   RADIOLOGY & ADDITIONAL TESTS:    Imaging Personally Reviewed:  [X] YES  [ ] NO

## 2023-03-24 NOTE — CONSULT NOTE ADULT - PROBLEM/RECOMMENDATION-1
DISPLAY PLAN FREE TEXT
laparoscopic possible open sigmoid colon resection lymph node dissection mobiloization of splenicflexure lysis of adhesions posssible ostomy

## 2023-03-24 NOTE — PHYSICAL THERAPY INITIAL EVALUATION ADULT - ADDITIONAL COMMENTS
Pt lives in house with 0  steps to enter,0  steps inside. Owns no DME. Pt's spouse will assist at home upon d/c. Has tub shower.

## 2023-03-24 NOTE — DISCHARGE NOTE NURSING/CASE MANAGEMENT/SOCIAL WORK - NSSCTYPOFSERV_GEN_ALL_CORE
Physical Therapist to visit the day after hospital discharge; Registered Nurse to follow if there is a need. Please contact the home care agency at the above phone number if you have not heard from them by 12 noon on the day after your hospital discharge  4 = No assist / stand by assistance

## 2023-03-24 NOTE — CARE COORDINATION ASSESSMENT. - NSPASTMEDSURGHISTORY_GEN_ALL_CORE_FT
PAST MEDICAL & SURGICAL HISTORY:  Anxiety      IBS (irritable bowel syndrome)      Hypothyroidism      History of bladder surgery  sling      H/O tubal ligation  bilateral      History of lipoma  removed from left shoulder      H/O bilateral breast reduction surgery      History of cataract surgery  bilateral      Status post arthroscopy of hip  left      H/O arthroscopy of right knee      History of carpal tunnel release  left      History of parotidectomy  left      History of abdominoplasty  2012, mini      S/P tracheoplasty  2018 can use size 7 ET tube &quot;I have been told I am totally fine now&quot;      Status post right breast lumpectomy  1996      H/O parotidectomy      Esophageal dysmotility      GERD (gastroesophageal reflux disease)      Allergic rhinitis, seasonal      Low serum vitamin D      H/O shoulder surgery  left      History of hyperkalemia      SVT (supraventricular tachycardia)      Osteoarthritis of right knee      HLD (hyperlipidemia)      S/P cervical spinal fusion

## 2023-03-24 NOTE — DISCHARGE NOTE PROVIDER - NSDCFUSCHEDAPPT_GEN_ALL_CORE_FT
Bogdan Read  [facility]  ONCORTHO  [place] [address]  Scheduled Appointment: 03/24/2023    Bogdan Read  [facility]  [address]  Scheduled Appointment: 04/11/2023    Reginald Gibbs  [facility]  OPHTHALM [address]  Scheduled Appointment: 04/25/2023    Dasha Melara  [facility]  GASTRO [address]  Scheduled Appointment: 05/11/2023    Glenn Goldstein  [facility]  CARDIOLOGY [address] [facility]   Scheduled Appointment: 06/06/2023     Bogdan Read  [facility]  [address]   Scheduled Appointment: 04/11/2023    Reginald Gibbs  [facility]  OPHTHALM [address]  Scheduled Appointment: 04/25/2023    Dasha Melara  [facility]  GASTRO [address]  Scheduled Appointment: 05/11/2023    Glenn Goldstein  [facility]  CARDIOLOGY [address] [facility]   Scheduled Appointment: 06/06/2023

## 2023-03-24 NOTE — DISCHARGE NOTE PROVIDER - NSDCCPCAREPLAN_GEN_ALL_CORE_FT
PRINCIPAL DISCHARGE DIAGNOSIS  Diagnosis: Primary osteoarthritis of right knee  Assessment and Plan of Treatment: For your total knee replacement:  Physical Therapy/Occupational Therapy for: ambulation, transfers, stairs, ADL's (activities of daily living), range of motion exercises, and isometrics  -Activity  • Weight Bearing as tolerated with rolling walker.  • Cryo/cuff 20 minutes several times daily with at least a 1 hour break in between icing sessions  • Take short, frequent walks increasing the distance that you walk each day as tolerated.  • Change your position every hour to decrease pain and stiffness.  • Continue the exercises taught to you by your physical therapist.  • No driving until cleared by the doctor.  • No tub baths, hot tubs, or swimming pools until instructed by your doctor.  • Do not squat down on the floor.  • Do not kneel or twist your knee.  • Range of Motion Goals: Flexion= 120 degrees, Extension = 0 degrees  You have a RUDDY dressing. You may shower. Disconnect RUDDY battery prior to showering. Reconnect battery after showering and press orange button to resume RUDDY power. Remove RUDDY dressing on post-op day #7.  Keep incision clean. DO NOT APPLY ANYTHING to incision site (salves/ointments/creams). Do not scrub incision site. Pat dry after shower.         PRINCIPAL DISCHARGE DIAGNOSIS  Diagnosis: Primary osteoarthritis of right knee  Assessment and Plan of Treatment: For your total knee replacement:  Physical Therapy/Occupational Therapy for: ambulation, transfers, stairs, ADL's (activities of daily living), range of motion exercises, and isometrics  -Activity  • Weight Bearing as tolerated with rolling walker.  • Cryo/cuff 20 minutes several times daily with at least a 1 hour break in between icing sessions  • Take short, frequent walks increasing the distance that you walk each day as tolerated.  • Change your position every hour to decrease pain and stiffness.  • Continue the exercises taught to you by your physical therapist.  • No driving until cleared by the doctor.  • No tub baths, hot tubs, or swimming pools until instructed by your doctor.  • Do not squat down on the floor.  • Do not kneel or twist your knee.  • Range of Motion Goals: Flexion= 120 degrees, Extension = 0 degrees  You have a RUDDY dressing. You may shower. Disconnect RUDDY battery prior to showering. Reconnect battery after showering and press orange button to resume RUDDY power. Remove RUDDY dressing on post-op day #7.  Keep incision clean. DO NOT APPLY ANYTHING to incision site (salves/ointments/creams). Do not scrub incision site. Pat dry after shower.  Opioid safety discussed w/ pt.  Do not keep opioid in the medicine cabinet in bathroom where others may have access to it.  Do not drive while taking opioid.  To dispose of it some pharmacies do have drop boxes as do police stations.  Do not flush or put in trash.

## 2023-03-24 NOTE — DISCHARGE NOTE PROVIDER - NSDCCPTREATMENT_GEN_ALL_CORE_FT
PRINCIPAL PROCEDURE  Procedure: Arthroplasty, knee, right, total, robot-assisted  Findings and Treatment: Severe DJD right knee.

## 2023-03-24 NOTE — PATIENT CHOICE NOTE. - NSPTCHOICESTATE_GEN_ALL_CORE

## 2023-03-24 NOTE — PHYSICAL THERAPY INITIAL EVALUATION ADULT - ACTIVE RANGE OF MOTION EXAMINATION, REHAB EVAL
Right knee flexion 70 degrees, ankle foot hip WNL/evelyn. upper extremity Active ROM was WNL (within normal limits)/LLE Active ROM was WNL (within normal limits)/deficits as listed below

## 2023-03-24 NOTE — DISCHARGE NOTE NURSING/CASE MANAGEMENT/SOCIAL WORK - PATIENT PORTAL LINK FT
You can access the FollowMyHealth Patient Portal offered by [facility] by registering at the following website: [email or web address]. By joining [facility]’s FollowMyHealth portal, you will also be able to view your health information using other applications (apps) compatible with our system.

## 2023-03-24 NOTE — CARE COORDINATION ASSESSMENT. - NSCAREPROVIDERS_GEN_ALL_CORE_FT
CARE PROVIDERS:  Administration: Nighat Khanna  Admitting: Bogdan Read  Attending: Bogdan Read  Consultant: Buck Birch  Consultant: Ilia Navarro  Covering Team: Joe Espino  Nurse: Jessica Perales  Nurse: July Farley  Nurse: Jo Bucio  Nurse: Juan Bui  Nurse: Leia Keith  Nurse: Clary Perez  Nurse: Sincere Handy  Outpatient Provider: Ezra Valadez  Override: Sincere Handy  Physical Therapy: Savanah Ibanez  Physical Therapy: Jamie Morales  Physical Therapy: Altagracia Baker  Primary Team: Rich Patel  Primary Team: Patti Santamaria  Primary Team: Salty Perdomo  Primary Team: Yasmine Guzman  Primary Team: Joel Calderon  Primary Team: Buck Birch  Primary Team: Umesh Dennison  Research: Kumar Perez  Team: [facility]  Hospitalists, Team  [facility]// Supp. Assoc.: Jannet Khan

## 2023-03-24 NOTE — DISCHARGE NOTE PROVIDER - NSDCCAREPROVSEEN_GEN_ALL_CORE_FT
Bogdan Read Michael Bogdan Read - Surgeon  Ilia Navarro - Anesthesia  Umesh Dennison -Medicaine  Joe Espino - Hospitalist  Buck Birch - Medicine  Joel Calderon - Hospitalist Bogdan Read - Surgeon  Ilia Navarro - Anesthesia  Umesh Dennison -Medicaine  Joe Espino - Hospitalist  Buck Birch - Medicine  Joel Calderon - Hospitalist  Jenna Underwood Bogdan Read - Surgeon  Ilia Navarro - Anesthesia  Umesh Dennison -Medicaine  Joe Espino - Hospitalist  Buck Birch - Medicine  Joel Calderon - [facility]

## 2023-03-24 NOTE — CARE COORDINATION ASSESSMENT. - LEGAL CONCERNS
06/06/18 1000   Discharge disposition   Expected discharge disposition [facility]   Name of Facillity/Home Care/Hospice [facility]   Patient is Discharged to a [address] [place] [address] Yes   Discharge transportation [facility] no concerns

## 2023-03-24 NOTE — PROGRESS NOTE ADULT - SUBJECTIVE AND OBJECTIVE BOX
Discharge medication calendar:  ASA EC 81mg q12h x 4 weeks  APAP 1000mg q8h x 2-3 weeks  Celecoxib 200mg q12h x 2-3 weeks  Pantoprazole 40mg BID x 6 more weeks (prescribed by PMD due to overuse of ibuprofen preop)  Famotidine 20mg BID x 6 more weeks (prescribed by PMD due to overuse of ibuprofen preop)  Narcotic PRN  Docusate 100mg TID while taking narcotic  Miralax, Senna, or Bisacodyl PRN for treatment of constipation  Narcan 4mg nasal spray prescribed for high risk opioid patient

## 2023-03-24 NOTE — DISCHARGE NOTE NURSING/CASE MANAGEMENT/SOCIAL WORK - NSSCNAMETXT_GEN_ALL_CORE
[facility] care agency [phone] will reach out to you within 24-72 hours of your discharge to schedule home care visit/eval appointment with you. Please call agency for any queries regarding home care services

## 2023-03-24 NOTE — DISCHARGE NOTE PROVIDER - NSDCMRMEDTOKEN_GEN_ALL_CORE_FT
COVID pre op (3/20/23 -3/21/23):   levothyroxine 25 mcg (0.025 mg) oral tablet: 1 tab(s) orally once a day  Linzess 290 mcg oral capsule: 1 cap(s) orally once a day  Ozempic 2 mg/1.5 mL (0.25 mg or 0.5 mg dose) subcutaneous solution: subcutaneous once a week  pantoprazole 40 mg oral granule, delayed release: 1 each orally 2 times a day  Pepcid 20 mg oral tablet: 1 tab(s) orally 2 times a day  Praluent Pen 75 mg/mL subcutaneous solution: subcutaneous 2 times a month  temazepam 30 mg oral capsule: 1 cap(s) orally once a day (at bedtime)  Xanax 0.5 mg oral tablet: 1 tab(s) orally 3 times a day, As Needed   Aspirin Enteric Coated 81 mg oral delayed release tablet: 1 tab orally every 12 hours. Take aspirin 2 hours before Celebrex/Meloxicam.  COVID pre op (3/20/23 -3/21/23):   levothyroxine 25 mcg (0.025 mg) oral tablet: 1 tab(s) orally once a day  Linzess 290 mcg oral capsule: 1 cap(s) orally once a day  Narcan 4 mg/0.1 mL nasal spray: Instill one spray into nostril as needed for drug overdose. May repeat dose in alternate nostril if needed in 2-3 minutes while awaiting EMS.    Ozempic 2 mg/1.5 mL (0.25 mg or 0.5 mg dose) subcutaneous solution: subcutaneous once a week  pantoprazole 40 mg oral granule, delayed release: 1 each orally 2 times a day  Pepcid 20 mg oral tablet: 1 tab(s) orally 2 times a day  Praluent Pen 75 mg/mL subcutaneous solution: subcutaneous 2 times a month  temazepam 30 mg oral capsule: 1 cap(s) orally once a day (at bedtime)  Xanax 0.5 mg oral tablet: 1 tab(s) orally 3 times a day, As Needed   acetaminophen 500 mg oral tablet: 2 tab(s) orally every 8 hours  Aspirin Enteric Coated 81 mg oral delayed release tablet: 1 tab orally every 12 hours. Take aspirin 2 hours before Celebrex/Meloxicam.  levothyroxine 25 mcg (0.025 mg) oral tablet: 1 tab(s) orally once a day  Linzess 290 mcg oral capsule: 1 cap(s) orally once a day  Narcan 4 mg/0.1 mL nasal spray: Instill one spray into nostril as needed for drug overdose. May repeat dose in alternate nostril if needed in 2-3 minutes while awaiting EMS.    oxyCODONE 5 mg oral tablet: 1-2  tab(s) orally every 4 hours, As Needed  for moderate to severe pain     do not drive while taking or combine with other narcotics or sedating medications.     [facility] : [phone]  MDD:6  Ozempic 2 mg/1.5 mL (0.25 mg or 0.5 mg dose) subcutaneous solution: subcutaneous once a week  pantoprazole 40 mg oral granule, delayed release: 1 each orally 2 times a day  Pepcid 20 mg oral tablet: 1 tab(s) orally 2 times a day  Praluent Pen 75 mg/mL subcutaneous solution: subcutaneous 2 times a month  temazepam 30 mg oral capsule: 1 cap(s) orally once a day (at bedtime)  Xanax 0.5 mg oral tablet: 1 tab(s) orally 3 times a day, As Needed   acetaminophen 500 mg oral tablet: 2 tab(s) orally every 8 hours  Aspirin Enteric Coated 81 mg oral delayed release tablet: 1 tab orally every 12 hours. Take aspirin 2 hours before Celebrex/Meloxicam.  levothyroxine 25 mcg (0.025 mg) oral tablet: 1 tab(s) orally once a day  Linzess 290 mcg oral capsule: 1 cap(s) orally once a day  Narcan 4 mg/0.1 mL nasal spray: Instill one spray into nostril as needed for drug overdose. May repeat dose in alternate nostril if needed in 2-3 minutes while awaiting EMS.    oxyCODONE 5 mg oral tablet: 1 tab(s) orally every 4 hours as needed for  moderate pain MDD: 6  Ozempic 2 mg/1.5 mL (0.25 mg or 0.5 mg dose) subcutaneous solution: subcutaneous once a week  pantoprazole 40 mg oral granule, delayed release: 1 each orally 2 times a day  Pepcid 20 mg oral tablet: 1 tab(s) orally 2 times a day  Praluent Pen 75 mg/mL subcutaneous solution: subcutaneous 2 times a month  temazepam 30 mg oral capsule: 1 cap(s) orally once a day (at bedtime)  Xanax 0.5 mg oral tablet: 1 tab(s) orally 3 times a day, As Needed   acetaminophen 500 mg oral tablet: 2 tab(s) orally every 8 hours  Aspirin Enteric Coated 81 mg oral delayed release tablet: 1 tab orally every 12 hours. Take aspirin 2 hours before Celebrex/Meloxicam.  levothyroxine 25 mcg (0.025 mg) oral tablet: 1 tab(s) orally once a day  Linzess 290 mcg oral capsule: 1 cap(s) orally once a day  Narcan 4 mg/0.1 mL nasal spray: Instill one spray into nostril as needed for drug overdose. May repeat dose in alternate nostril if needed in 2-3 minutes while awaiting EMS.    oxyCODONE 5 mg oral tablet: 1 tab(s) orally every 4 hours as needed for  moderate pain MDD: 6  Ozempic 2 mg/1.5 mL (0.25 mg or 0.5 mg dose) subcutaneous solution: subcutaneous once a week  pantoprazole 40 mg oral granule, delayed release: 1 each orally 2 times a day  Pepcid 20 mg oral tablet: 1 tab(s) orally 2 times a day  Praluent Pen 75 mg/mL subcutaneous solution: subcutaneous 2 times a month  Rolling Walker 3 inch wheels - sp Right Total Knee Replacement: Medical Device  temazepam 30 mg oral capsule: 1 cap(s) orally once a day (at bedtime)  Xanax 0.5 mg oral tablet: 1 tab(s) orally 3 times a day, As Needed

## 2023-03-24 NOTE — CONSULT NOTE ADULT - ASSESSMENT
52 years old female with past medical history of hypothyroidism, hyperlipidemia,  tracheomalacia, IBS, anxiety, insomnia, allergic rhinitis, GERD, esophageal dysmotility, SVT, vitamin D deficiency, history of arthroscopic surgery of the right knee, osteoarthritis of the knee, who is been having increasing pain in her right knee.  Patient had failed outpatient treatment with conservative measures.  She was recommended right total knee arthroplasty.  Patient is seen postoperatively for medical comanagement.

## 2023-03-24 NOTE — CARE COORDINATION ASSESSMENT. - NSDCPLANSERVICES_GEN_ALL_CORE
s/p rt total knee replacement    Met with pt at the bedside and CM reviewed role and availability of CM throughout her hospital stay.  Pt appears well, has no complaints and is aware and agreeable with her transition plan of care for home PT services anticipating discharge today 3/25/2023  Pt denies any recent HCS and DME. Referral sent to [facility] [phone] for rolling walker.  Patient identified her spouse Arslan  who will be her caregiver and will transport her home tomorrow. Patient lives in a private house with her , no stairs to navigate. Prior to surgery patient was ind including driving and working.  Transition of care folder w/ list of certified hc agencies provided to the patient.  Patient chose [facility]. Referral sent accordingly anticipating SOC 3/26/2023   HC expectations, insurance guidelines, criteria explained to the patient w/ good understanding.  RUDDY dressing information provided and reviewed.  CM contact info provided to the pt.  CM will remain available.  PCP LUBA BONILLA [phone]  PH[facility]

## 2023-03-24 NOTE — PHYSICAL THERAPY INITIAL EVALUATION ADULT - PERTINENT HX OF CURRENT PROBLEM, REHAB EVAL
52 year old female who presents with progressively worsening right knee pain and difficulty walking for the past several months . Reports constant pain and pain exacerbates with walking and any activities , scheduled for right knee replacement on 3/24/23

## 2023-03-25 LAB
ANION GAP SERPL CALC-SCNC: 8 MMOL/L — SIGNIFICANT CHANGE UP (ref 5–17)
BUN SERPL-MCNC: 14 MG/DL — SIGNIFICANT CHANGE UP (ref 7–23)
CALCIUM SERPL-MCNC: 8.8 MG/DL — SIGNIFICANT CHANGE UP (ref 8.4–10.5)
CHLORIDE SERPL-SCNC: 103 MMOL/L — SIGNIFICANT CHANGE UP (ref 96–108)
CO2 SERPL-SCNC: 28 MMOL/L — SIGNIFICANT CHANGE UP (ref 22–31)
CREAT SERPL-MCNC: 0.71 MG/DL — SIGNIFICANT CHANGE UP (ref 0.5–1.3)
EGFR: 101 ML/MIN/1.73M2 — SIGNIFICANT CHANGE UP
GLUCOSE SERPL-MCNC: 89 MG/DL — SIGNIFICANT CHANGE UP (ref 70–99)
HCT VFR BLD CALC: 31.4 % — LOW (ref 34.5–45)
HGB BLD-MCNC: 10.6 G/DL — LOW (ref 11.5–15.5)
MCHC RBC-ENTMCNC: 30.3 PG — SIGNIFICANT CHANGE UP (ref 27–34)
MCHC RBC-ENTMCNC: 33.8 GM/DL — SIGNIFICANT CHANGE UP (ref 32–36)
MCV RBC AUTO: 89.7 FL — SIGNIFICANT CHANGE UP (ref 80–100)
NRBC # BLD: 0 /100 WBCS — SIGNIFICANT CHANGE UP (ref 0–0)
NT-PROBNP SERPL-SCNC: 103 PG/ML — SIGNIFICANT CHANGE UP (ref 0–125)
PLATELET # BLD AUTO: 239 K/UL — SIGNIFICANT CHANGE UP (ref 150–400)
POTASSIUM SERPL-MCNC: 4.2 MMOL/L — SIGNIFICANT CHANGE UP (ref 3.5–5.3)
POTASSIUM SERPL-SCNC: 4.2 MMOL/L — SIGNIFICANT CHANGE UP (ref 3.5–5.3)
RBC # BLD: 3.5 M/UL — LOW (ref 3.8–5.2)
RBC # FLD: 13 % — SIGNIFICANT CHANGE UP (ref 10.3–14.5)
SODIUM SERPL-SCNC: 139 MMOL/L — SIGNIFICANT CHANGE UP (ref 135–145)
WBC # BLD: 9.76 K/UL — SIGNIFICANT CHANGE UP (ref 3.8–10.5)
WBC # FLD AUTO: 9.76 K/UL — SIGNIFICANT CHANGE UP (ref 3.8–10.5)

## 2023-03-25 PROCEDURE — 93971 EXTREMITY STUDY: CPT | Mod: 26,RT

## 2023-03-25 RX ORDER — HYDROMORPHONE HYDROCHLORIDE 2 MG/ML
2 INJECTION INTRAMUSCULAR; INTRAVENOUS; SUBCUTANEOUS
Refills: 0 | Status: DISCONTINUED | OUTPATIENT
Start: 2023-03-25 | End: 2023-03-25

## 2023-03-25 RX ORDER — HYDROMORPHONE HYDROCHLORIDE 2 MG/ML
4 INJECTION INTRAMUSCULAR; INTRAVENOUS; SUBCUTANEOUS
Refills: 0 | Status: DISCONTINUED | OUTPATIENT
Start: 2023-03-25 | End: 2023-03-26

## 2023-03-25 RX ORDER — HYDROMORPHONE HYDROCHLORIDE 2 MG/ML
4 INJECTION INTRAMUSCULAR; INTRAVENOUS; SUBCUTANEOUS
Refills: 0 | Status: DISCONTINUED | OUTPATIENT
Start: 2023-03-25 | End: 2023-03-25

## 2023-03-25 RX ORDER — HYDROMORPHONE HYDROCHLORIDE 2 MG/ML
2 INJECTION INTRAMUSCULAR; INTRAVENOUS; SUBCUTANEOUS
Refills: 0 | Status: DISCONTINUED | OUTPATIENT
Start: 2023-03-25 | End: 2023-03-26

## 2023-03-25 RX ORDER — OXYCODONE HYDROCHLORIDE 5 MG/1
10 TABLET ORAL ONCE
Refills: 0 | Status: DISCONTINUED | OUTPATIENT
Start: 2023-03-25 | End: 2023-03-25

## 2023-03-25 RX ADMIN — Medication 1000 MILLIGRAM(S): at 23:02

## 2023-03-25 RX ADMIN — TEMAZEPAM 30 MILLIGRAM(S): 15 CAPSULE ORAL at 00:25

## 2023-03-25 RX ADMIN — HYDROMORPHONE HYDROCHLORIDE 2 MILLIGRAM(S): 2 INJECTION INTRAMUSCULAR; INTRAVENOUS; SUBCUTANEOUS at 18:49

## 2023-03-25 RX ADMIN — Medication 100 MILLIGRAM(S): at 00:25

## 2023-03-25 RX ADMIN — HYDROMORPHONE HYDROCHLORIDE 2 MILLIGRAM(S): 2 INJECTION INTRAMUSCULAR; INTRAVENOUS; SUBCUTANEOUS at 14:30

## 2023-03-25 RX ADMIN — OXYCODONE HYDROCHLORIDE 10 MILLIGRAM(S): 5 TABLET ORAL at 23:06

## 2023-03-25 RX ADMIN — HYDROMORPHONE HYDROCHLORIDE 2 MILLIGRAM(S): 2 INJECTION INTRAMUSCULAR; INTRAVENOUS; SUBCUTANEOUS at 15:30

## 2023-03-25 RX ADMIN — PANTOPRAZOLE SODIUM 40 MILLIGRAM(S): 20 TABLET, DELAYED RELEASE ORAL at 05:30

## 2023-03-25 RX ADMIN — PANTOPRAZOLE SODIUM 40 MILLIGRAM(S): 20 TABLET, DELAYED RELEASE ORAL at 17:51

## 2023-03-25 RX ADMIN — HYDROMORPHONE HYDROCHLORIDE 0.5 MILLIGRAM(S): 2 INJECTION INTRAMUSCULAR; INTRAVENOUS; SUBCUTANEOUS at 03:50

## 2023-03-25 RX ADMIN — ONDANSETRON 4 MILLIGRAM(S): 8 TABLET, FILM COATED ORAL at 18:46

## 2023-03-25 RX ADMIN — HYDROMORPHONE HYDROCHLORIDE 4 MILLIGRAM(S): 2 INJECTION INTRAMUSCULAR; INTRAVENOUS; SUBCUTANEOUS at 10:45

## 2023-03-25 RX ADMIN — HYDROMORPHONE HYDROCHLORIDE 2 MILLIGRAM(S): 2 INJECTION INTRAMUSCULAR; INTRAVENOUS; SUBCUTANEOUS at 17:52

## 2023-03-25 RX ADMIN — Medication 81 MILLIGRAM(S): at 17:51

## 2023-03-25 RX ADMIN — FAMOTIDINE 20 MILLIGRAM(S): 10 INJECTION INTRAVENOUS at 14:29

## 2023-03-25 RX ADMIN — Medication 1000 MILLIGRAM(S): at 14:29

## 2023-03-25 RX ADMIN — Medication 25 MICROGRAM(S): at 05:30

## 2023-03-25 RX ADMIN — Medication 81 MILLIGRAM(S): at 05:30

## 2023-03-25 RX ADMIN — Medication 200 MILLIGRAM(S): at 21:38

## 2023-03-25 RX ADMIN — HYDROMORPHONE HYDROCHLORIDE 4 MILLIGRAM(S): 2 INJECTION INTRAMUSCULAR; INTRAVENOUS; SUBCUTANEOUS at 09:55

## 2023-03-25 NOTE — PROVIDER CONTACT NOTE (OTHER) - BACKGROUND
Velasquez Doppler negative for today. Pt complaining of numbness, tingling in foot/toes, nausea, right leg pain since this morning per shift change report.

## 2023-03-25 NOTE — PROVIDER CONTACT NOTE (OTHER) - SITUATION
s/p right TKR, 3/24. Pt got zofran at 1846, nausea continues, pt complaining of numbness, tingling, warmth in right toes, foot, pain from, top of thigh to toes, diluauid 2mg at 1800.

## 2023-03-25 NOTE — PROGRESS NOTE ADULT - SUBJECTIVE AND OBJECTIVE BOX
Patient is a 52y old  Female who presents with a chief complaint of S/P Right TKR 3/24 (24 Mar 2023 16:05)    HPI:    INTERVAL HPI/OVERNIGHT EVENTS:  Chart reviewed, notes reviewed.  Patient seen and examined.  Pain is fairly controlled with medications.  Ambulated with PT.   Doing incentive spirometry on regular basis.  Pain Location & Control:     PAST MEDICAL & SURGICAL HISTORY:  Hypothyroidism      IBS (irritable bowel syndrome)      Anxiety      Low serum vitamin D      Allergic rhinitis, seasonal      GERD (gastroesophageal reflux disease)      Esophageal dysmotility      HLD (hyperlipidemia)      Osteoarthritis of right knee      SVT (supraventricular tachycardia)      History of hyperkalemia      History of abdominoplasty  2012, mini      History of parotidectomy  left      History of carpal tunnel release  left      H/O arthroscopy of right knee      Status post arthroscopy of hip  left      History of cataract surgery  bilateral      H/O bilateral breast reduction surgery      History of lipoma  removed from left shoulder      H/O tubal ligation  bilateral      History of bladder surgery  sling      Status post right breast lumpectomy  1996      S/P tracheoplasty  2018 can use size 7 ET tube "I have been told I am totally fine now"      H/O parotidectomy      H/O shoulder surgery  left      S/P cervical spinal fusion          Allergies    codeine (Urticaria)  Hycodan (Rash)  penicillin (Rash; Other)  prednisoLONE (Swelling; Other)    Intolerances    atorvastatin (Muscle Pain)  niacin (Flushing)  rosuvastatin (Muscle Pain)  Vicodin (Pruritus)      Home Medications:  acetaminophen 500 mg oral tablet: 2 tab(s) orally every 8 hours (24 Mar 2023 18:14)  levothyroxine 25 mcg (0.025 mg) oral tablet: 1 tab(s) orally once a day (24 Mar 2023 07:53)  Linzess 290 mcg oral capsule: 1 cap(s) orally once a day (24 Mar 2023 07:53)  Ozempic 2 mg/1.5 mL (0.25 mg or 0.5 mg dose) subcutaneous solution: subcutaneous once a week (24 Mar 2023 07:53)  pantoprazole 40 mg oral granule, delayed release: 1 each orally 2 times a day (24 Mar 2023 07:53)  Pepcid 20 mg oral tablet: 1 tab(s) orally 2 times a day (24 Mar 2023 07:53)  Praluent Pen 75 mg/mL subcutaneous solution: subcutaneous 2 times a month (24 Mar 2023 07:53)  temazepam 30 mg oral capsule: 1 cap(s) orally once a day (at bedtime) (24 Mar 2023 07:53)  Xanax 0.5 mg oral tablet: 1 tab(s) orally 3 times a day, As Needed (24 Mar 2023 07:53)      MEDICATIONS  (STANDING):  acetaminophen     Tablet .. 1000 milliGRAM(s) Oral every 8 hours  aspirin enteric coated 81 milliGRAM(s) Oral every 12 hours  famotidine    Tablet 20 milliGRAM(s) Oral daily  lactated ringers. 1000 milliLiter(s) (125 mL/Hr) IV Continuous <Continuous>  levothyroxine 25 MICROGram(s) Oral daily  pantoprazole    Tablet 40 milliGRAM(s) Oral every 12 hours  polyethylene glycol 3350 17 Gram(s) Oral at bedtime  senna 2 Tablet(s) Oral at bedtime    MEDICATIONS  (PRN):  ALPRAZolam 0.5 milliGRAM(s) Oral three times a day PRN anxiety  HYDROmorphone   Tablet 2 milliGRAM(s) Oral every 3 hours PRN Moderate Pain (4 - 6)  HYDROmorphone   Tablet 4 milliGRAM(s) Oral every 3 hours PRN Severe Pain (7 - 10)  HYDROmorphone  Injectable 0.5 milliGRAM(s) IV Push every 3 hours PRN breakthrough pain  magnesium hydroxide Suspension 30 milliLiter(s) Oral daily PRN Constipation  ondansetron Injectable 4 milliGRAM(s) IV Push every 6 hours PRN Nausea and/or Vomiting  temazepam 30 milliGRAM(s) Oral at bedtime PRN Insomnia      REVIEW OF SYSTEMS:  CONSTITUTIONAL: No fever, weight loss, or fatigue  EYES: No eye pain,  or discharge  ENMT:  No sinus or throat pain  NECK: No pain or stiffness  BREASTS: No pain, masses, or nipple discharge  RESPIRATORY: No cough, wheezing, chills or hemoptysis; No shortness of breath  CARDIOVASCULAR: No chest pain, palpitations, dizziness, or leg swelling  GASTROINTESTINAL: No abdominal or epigastric pain. No nausea, vomiting.  GENITOURINARY: No dysuria, frequency, hematuria, or incontinence  NEUROLOGICAL: No headaches, memory loss, loss of strength, numbness, or tremors  SKIN: No itching, burning, rashes, or lesions   LYMPH NODES: No enlarged glands  ENDOCRINE: No polydipsia or polyuria  MUSCULOSKELETAL: No back pain  PSYCHIATRIC: No depression, anxiety, mood swings.   HEME/LYMPH: No easy bruising, or bleeding gums  ALLERGY AND IMMUNOLOGIC: No hives or eczema    Vital Signs Last 24 Hrs  T(C): 36.5 (25 Mar 2023 07:00), Max: 36.8 (24 Mar 2023 15:15)  T(F): 97.7 (25 Mar 2023 07:00), Max: 98.2 (24 Mar 2023 15:15)  HR: 71 (25 Mar 2023 07:00) (67 - 75)  BP: 94/53 (25 Mar 2023 12:02) (94/53 - 114/74)  BP(mean): --  RR: 16 (25 Mar 2023 07:00) (16 - 18)  SpO2: 98% (25 Mar 2023 07:00) (92% - 99%)    Parameters below as of 24 Mar 2023 15:15  Patient On (Oxygen Delivery Method): room air        PHYSICAL EXAM:  GENERAL: NAD, well-groomed, well-developed  HEAD:  Atraumatic, Normocephalic  EYES: EOMI, PERRLA, conjunctiva and sclera clear  ENMT: Moist mucous membranes,  No lesions  NECK: Supple,   CHEST/LUNG: Clear to auscultation bilaterally; No rales, rhonchi, wheezing, or rubs  HEART: Regular rate and rhythm; No murmurs, rubs, or gallops  ABDOMEN: Soft, Nontender, Nondistended; Bowel sounds present  EXTREMITIES:  2+ Peripheral Pulses, No clubbing or cyanosis  LYMPH: No lymphadenopathy noted  SKIN: No rashes or lesions  INCISION:  Dressing dry and intact  NERVOUS SYSTEM:  No focal deficit.   PSYCH: AAO X 3, good concentration.     LABS:                        10.6   9.76  )-----------( 239      ( 25 Mar 2023 06:53 )             31.4     25 Mar 2023 06:53    139    |  103    |  14     ----------------------------<  89     4.2     |  28     |  0.71     Ca    8.8        25 Mar 2023 06:53          CAPILLARY BLOOD GLUCOSE          RADIOLOGY & ADDITIONAL TESTS:    Imaging Personally Reviewed:  [ ] YES      Consultant(s) Notes Reviewed:     Care Discussed with Consultants/Other Providers:     Patient is a 52y old  Female who presents with a chief complaint of right knee pain.    HPI: 52 years old female with past medical history of hypothyroidism, hyperlipidemia,  tracheomalacia, IBS, anxiety, insomnia, allergic rhinitis, GERD, esophageal dysmotility, SVT, vitamin D deficiency, history of arthroscopic surgery of the right knee, osteoarthritis of the knee, who is been having increasing pain in her right knee.  Patient had failed outpatient treatment with conservative measures.  She was recommended right total knee arthroplasty.  Patient is seen postoperatively for medical comanagement.    INTERVAL HPI/OVERNIGHT EVENTS:  Chart reviewed, notes reviewed.  Patient seen and examined.  Ambulated with PT.   Doing incentive spirometry on regular basis.  Pain Location & Control: right knee, not very well controlled.    03/25/2023 --> events noted, patient had increasing pain in her right knee and leg.  She had Doppler of lower extreme the stone.  Her pain medication regimen was changed.  She denies any chest pain or chest pressure.  She denies any nausea or vomiting.  She denies any fevers or chills.    PAST MEDICAL & SURGICAL HISTORY:  Hypothyroidism      IBS (irritable bowel syndrome)      Anxiety      Low serum vitamin D      Allergic rhinitis, seasonal      GERD (gastroesophageal reflux disease)      Esophageal dysmotility      HLD (hyperlipidemia)      Osteoarthritis of right knee      SVT (supraventricular tachycardia)      History of hyperkalemia      History of abdominoplasty  2012, mini      History of parotidectomy  left      History of carpal tunnel release  left      H/O arthroscopy of right knee      Status post arthroscopy of hip  left      History of cataract surgery  bilateral      H/O bilateral breast reduction surgery      History of lipoma  removed from left shoulder      H/O tubal ligation  bilateral      History of bladder surgery  sling      Status post right breast lumpectomy  1996      S/P tracheoplasty  2018 can use size 7 ET tube "I have been told I am totally fine now"      H/O parotidectomy      H/O shoulder surgery  left      S/P cervical spinal fusion          Allergies    codeine (Urticaria)  Hycodan (Rash)  penicillin (Rash; Other)  prednisoLONE (Swelling; Other)    Intolerances    atorvastatin (Muscle Pain)  niacin (Flushing)  rosuvastatin (Muscle Pain)  Vicodin (Pruritus)      Home Medications:  acetaminophen 500 mg oral tablet: 2 tab(s) orally every 8 hours (24 Mar 2023 18:14)  levothyroxine 25 mcg (0.025 mg) oral tablet: 1 tab(s) orally once a day (24 Mar 2023 07:53)  Linzess 290 mcg oral capsule: 1 cap(s) orally once a day (24 Mar 2023 07:53)  Ozempic 2 mg/1.5 mL (0.25 mg or 0.5 mg dose) subcutaneous solution: subcutaneous once a week (24 Mar 2023 07:53)  pantoprazole 40 mg oral granule, delayed release: 1 each orally 2 times a day (24 Mar 2023 07:53)  Pepcid 20 mg oral tablet: 1 tab(s) orally 2 times a day (24 Mar 2023 07:53)  Praluent Pen 75 mg/mL subcutaneous solution: subcutaneous 2 times a month (24 Mar 2023 07:53)  temazepam 30 mg oral capsule: 1 cap(s) orally once a day (at bedtime) (24 Mar 2023 07:53)  Xanax 0.5 mg oral tablet: 1 tab(s) orally 3 times a day, As Needed (24 Mar 2023 07:53)      MEDICATIONS  (STANDING):  acetaminophen     Tablet .. 1000 milliGRAM(s) Oral every 8 hours  aspirin enteric coated 81 milliGRAM(s) Oral every 12 hours  famotidine    Tablet 20 milliGRAM(s) Oral daily  lactated ringers. 1000 milliLiter(s) (125 mL/Hr) IV Continuous <Continuous>  levothyroxine 25 MICROGram(s) Oral daily  pantoprazole    Tablet 40 milliGRAM(s) Oral every 12 hours  polyethylene glycol 3350 17 Gram(s) Oral at bedtime  senna 2 Tablet(s) Oral at bedtime    MEDICATIONS  (PRN):  ALPRAZolam 0.5 milliGRAM(s) Oral three times a day PRN anxiety  HYDROmorphone   Tablet 2 milliGRAM(s) Oral every 3 hours PRN Moderate Pain (4 - 6)  HYDROmorphone   Tablet 4 milliGRAM(s) Oral every 3 hours PRN Severe Pain (7 - 10)  HYDROmorphone  Injectable 0.5 milliGRAM(s) IV Push every 3 hours PRN breakthrough pain  magnesium hydroxide Suspension 30 milliLiter(s) Oral daily PRN Constipation  ondansetron Injectable 4 milliGRAM(s) IV Push every 6 hours PRN Nausea and/or Vomiting  temazepam 30 milliGRAM(s) Oral at bedtime PRN Insomnia      REVIEW OF SYSTEMS:  CONSTITUTIONAL: No fever, weight loss, or fatigue  EYES: No eye pain,  or discharge  ENMT:  No sinus or throat pain  NECK: No pain or stiffness  BREASTS: No pain, masses, or nipple discharge  RESPIRATORY: No cough, wheezing, chills or hemoptysis; No shortness of breath  CARDIOVASCULAR: No chest pain, palpitations, dizziness, or leg swelling  GASTROINTESTINAL: No abdominal or epigastric pain. No nausea, vomiting.  GENITOURINARY: No dysuria, frequency, hematuria, or incontinence  NEUROLOGICAL: No headaches, memory loss, loss of strength, numbness, or tremors  SKIN: No itching, burning, rashes, or lesions   LYMPH NODES: No enlarged glands  ENDOCRINE: No polydipsia or polyuria  MUSCULOSKELETAL: right knee pain.  PSYCHIATRIC: positive for anxiety.  HEME/LYMPH: No easy bruising, or bleeding gums  ALLERGY AND IMMUNOLOGIC: No hives or eczema    Vital Signs Last 24 Hrs  T(C): 36.5 (25 Mar 2023 07:00), Max: 36.8 (24 Mar 2023 15:15)  T(F): 97.7 (25 Mar 2023 07:00), Max: 98.2 (24 Mar 2023 15:15)  HR: 71 (25 Mar 2023 07:00) (67 - 75)  BP: 94/53 (25 Mar 2023 12:02) (94/53 - 114/74)  BP(mean): --  RR: 16 (25 Mar 2023 07:00) (16 - 18)  SpO2: 98% (25 Mar 2023 07:00) (92% - 99%)    Parameters below as of 24 Mar 2023 15:15  Patient On (Oxygen Delivery Method): room air    PHYSICAL EXAM:  GENERAL: NAD, well-groomed, well-developed  HEAD:  Atraumatic, Normocephalic  EYES: EOMI, PERRLA, conjunctiva and sclera clear  ENMT: Moist mucous membranes,  No lesions  NECK: Supple,   CHEST/LUNG: Clear to auscultation bilaterally; No rales, rhonchi, wheezing, or rubs  HEART: Regular rate and rhythm; No murmurs, rubs, or gallops  ABDOMEN: Soft, Nontender, Nondistended; Bowel sounds present  EXTREMITIES:  2+ Peripheral Pulses, No clubbing or cyanosis  LYMPH: No lymphadenopathy noted  SKIN: No rashes or lesions  INCISION:  Dressing dry and intact  NERVOUS SYSTEM:  No focal deficit.   PSYCH: AAO X 3, good concentration.     LABS:                        10.6   9.76  )-----------( 239      ( 25 Mar 2023 06:53 )             31.4     25 Mar 2023 06:53    139    |  103    |  14     ----------------------------<  89     4.2     |  28     |  0.71     Ca    8.8        25 Mar 2023 06:53      RADIOLOGY & ADDITIONAL TESTS:    < from: US Duplex Venous Lower Ext Ltd, Right (03.25.23 @ 11:38) >  ACC: [id number] EXAM:  US DPLX LWR EXT VEINS LTD RT   ORDERED BY: TL GORMAN     PROCEDURE DATE:  03/25/2023          INTERPRETATION:  CLINICAL INFORMATION: Right calf pain    COMPARISON: None available.    TECHNIQUE: Duplex sonography of theRIGHT LOWER extremity veins with   color and spectral Doppler, with and without compression.    FINDINGS:    Eccentric wall thickening of the proximal deep femoral vein with patent   flow, likely sequela of postthrombotic change.  There is normal compressibility of the right common femoral, femoral and   popliteal veins.  The contralateral common femoral vein is patent.  Doppler examination shows normal spontaneous and phasic flow.    No calf vein thrombosis is detected.  3.4 cm Baker cyst.    IMPRESSION:  No evidence of an acute right lower extremity deep venous thrombosis.    < end of copied text >  < from: US Duplex Venous Lower Ext Ltd, Left (03.25.23 @ 20:01) >  ACC: [id number] EXAM:  US DPLX LWR EXT VEINS LTD LT   ORDERED BY: CPOE   PROVIDER BACKLOAD     PROCEDURE DATE:  03/25/2023          INTERPRETATION:  CLINICAL INFORMATION: Status post orthopedic surgery    COMPARISON: None available.    TECHNIQUE: Duplex sonography of the LEFT LOWER extremity veins with color   and spectral Doppler, with and without compression.    FINDINGS:    There is normal compressibility of the left common femoral, femoral and   popliteal veins.  The contralateral common femoral vein is patent.  Doppler examination shows normal spontaneous and phasic flow.    No calf vein thrombosis is detected.    IMPRESSION:  No evidence of left lower extremity deep venous thrombosis.    --- End of Report ---    < end of copied text >    Imaging Personally Reviewed:  [ ] YES      Consultant(s) Notes Reviewed: Yes    Care Discussed with Consultants/Other Providers: Yes

## 2023-03-25 NOTE — DOWNTIME INTERRUPTION NOTE - WHICH MANUAL FORMS INITIATED?
Entered, VS, EMAR. See paper chart for clinical information entered during the sunrise downtime ([facility])

## 2023-03-26 DIAGNOSIS — E03.9 HYPOTHYROIDISM, UNSPECIFIED: ICD-10-CM

## 2023-03-26 DIAGNOSIS — F41.9 ANXIETY DISORDER, UNSPECIFIED: ICD-10-CM

## 2023-03-26 DIAGNOSIS — M17.11 UNILATERAL PRIMARY OSTEOARTHRITIS, RIGHT KNEE: ICD-10-CM

## 2023-03-26 DIAGNOSIS — D62 ACUTE POSTHEMORRHAGIC ANEMIA: ICD-10-CM

## 2023-03-26 DIAGNOSIS — Z29.9 ENCOUNTER FOR PROPHYLACTIC MEASURES, UNSPECIFIED: ICD-10-CM

## 2023-03-26 DIAGNOSIS — K21.9 GASTRO-ESOPHAGEAL REFLUX DISEASE WITHOUT ESOPHAGITIS: ICD-10-CM

## 2023-03-26 DIAGNOSIS — G47.00 INSOMNIA, UNSPECIFIED: ICD-10-CM

## 2023-03-26 LAB
ANION GAP SERPL CALC-SCNC: 6 MMOL/L — SIGNIFICANT CHANGE UP (ref 5–17)
BUN SERPL-MCNC: 8 MG/DL — SIGNIFICANT CHANGE UP (ref 7–23)
CALCIUM SERPL-MCNC: 8.8 MG/DL — SIGNIFICANT CHANGE UP (ref 8.4–10.5)
CHLORIDE SERPL-SCNC: 107 MMOL/L — SIGNIFICANT CHANGE UP (ref 96–108)
CO2 SERPL-SCNC: 32 MMOL/L — HIGH (ref 22–31)
CREAT SERPL-MCNC: 0.61 MG/DL — SIGNIFICANT CHANGE UP (ref 0.5–1.3)
EGFR: 106 ML/MIN/1.73M2 — SIGNIFICANT CHANGE UP
GLUCOSE SERPL-MCNC: 117 MG/DL — HIGH (ref 70–99)
HCT VFR BLD CALC: 31.1 % — LOW (ref 34.5–45)
HGB BLD-MCNC: 10.3 G/DL — LOW (ref 11.5–15.5)
MCHC RBC-ENTMCNC: 30.3 PG — SIGNIFICANT CHANGE UP (ref 27–34)
MCHC RBC-ENTMCNC: 33.1 GM/DL — SIGNIFICANT CHANGE UP (ref 32–36)
MCV RBC AUTO: 91.5 FL — SIGNIFICANT CHANGE UP (ref 80–100)
NRBC # BLD: 0 /100 WBCS — SIGNIFICANT CHANGE UP (ref 0–0)
PLATELET # BLD AUTO: 221 K/UL — SIGNIFICANT CHANGE UP (ref 150–400)
POTASSIUM SERPL-MCNC: 4.1 MMOL/L — SIGNIFICANT CHANGE UP (ref 3.5–5.3)
POTASSIUM SERPL-SCNC: 4.1 MMOL/L — SIGNIFICANT CHANGE UP (ref 3.5–5.3)
RBC # BLD: 3.4 M/UL — LOW (ref 3.8–5.2)
RBC # FLD: 13.2 % — SIGNIFICANT CHANGE UP (ref 10.3–14.5)
SODIUM SERPL-SCNC: 145 MMOL/L — SIGNIFICANT CHANGE UP (ref 135–145)
WBC # BLD: 8.39 K/UL — SIGNIFICANT CHANGE UP (ref 3.8–10.5)
WBC # FLD AUTO: 8.39 K/UL — SIGNIFICANT CHANGE UP (ref 3.8–10.5)

## 2023-03-26 RX ORDER — OXYCODONE HYDROCHLORIDE 5 MG/1
1 TABLET ORAL
Qty: 42 | Refills: 0
Start: 2023-03-26 | End: 2023-04-01

## 2023-03-26 RX ORDER — OXYCODONE HYDROCHLORIDE 5 MG/1
10 TABLET ORAL ONCE
Refills: 0 | Status: DISCONTINUED | OUTPATIENT
Start: 2023-03-26 | End: 2023-03-26

## 2023-03-26 RX ORDER — OXYCODONE HYDROCHLORIDE 5 MG/1
5 TABLET ORAL
Refills: 0 | Status: DISCONTINUED | OUTPATIENT
Start: 2023-03-26 | End: 2023-03-27

## 2023-03-26 RX ORDER — OXYCODONE HYDROCHLORIDE 5 MG/1
10 TABLET ORAL
Refills: 0 | Status: DISCONTINUED | OUTPATIENT
Start: 2023-03-26 | End: 2023-03-27

## 2023-03-26 RX ORDER — DIAZEPAM 5 MG
5 TABLET ORAL EVERY 8 HOURS
Refills: 0 | Status: DISCONTINUED | OUTPATIENT
Start: 2023-03-26 | End: 2023-03-27

## 2023-03-26 RX ADMIN — OXYCODONE HYDROCHLORIDE 10 MILLIGRAM(S): 5 TABLET ORAL at 20:03

## 2023-03-26 RX ADMIN — PANTOPRAZOLE SODIUM 40 MILLIGRAM(S): 20 TABLET, DELAYED RELEASE ORAL at 06:14

## 2023-03-26 RX ADMIN — OXYCODONE HYDROCHLORIDE 10 MILLIGRAM(S): 5 TABLET ORAL at 17:03

## 2023-03-26 RX ADMIN — SENNA PLUS 2 TABLET(S): 8.6 TABLET ORAL at 22:05

## 2023-03-26 RX ADMIN — Medication 1000 MILLIGRAM(S): at 13:55

## 2023-03-26 RX ADMIN — PANTOPRAZOLE SODIUM 40 MILLIGRAM(S): 20 TABLET, DELAYED RELEASE ORAL at 17:02

## 2023-03-26 RX ADMIN — Medication 81 MILLIGRAM(S): at 17:02

## 2023-03-26 RX ADMIN — OXYCODONE HYDROCHLORIDE 10 MILLIGRAM(S): 5 TABLET ORAL at 14:50

## 2023-03-26 RX ADMIN — OXYCODONE HYDROCHLORIDE 10 MILLIGRAM(S): 5 TABLET ORAL at 08:50

## 2023-03-26 RX ADMIN — Medication 5 MILLIGRAM(S): at 09:56

## 2023-03-26 RX ADMIN — Medication 1000 MILLIGRAM(S): at 22:30

## 2023-03-26 RX ADMIN — OXYCODONE HYDROCHLORIDE 10 MILLIGRAM(S): 5 TABLET ORAL at 00:01

## 2023-03-26 RX ADMIN — OXYCODONE HYDROCHLORIDE 10 MILLIGRAM(S): 5 TABLET ORAL at 10:53

## 2023-03-26 RX ADMIN — Medication 1000 MILLIGRAM(S): at 06:13

## 2023-03-26 RX ADMIN — Medication 1000 MILLIGRAM(S): at 22:05

## 2023-03-26 RX ADMIN — OXYCODONE HYDROCHLORIDE 10 MILLIGRAM(S): 5 TABLET ORAL at 18:00

## 2023-03-26 RX ADMIN — OXYCODONE HYDROCHLORIDE 10 MILLIGRAM(S): 5 TABLET ORAL at 23:05

## 2023-03-26 RX ADMIN — Medication 25 MICROGRAM(S): at 06:14

## 2023-03-26 RX ADMIN — OXYCODONE HYDROCHLORIDE 10 MILLIGRAM(S): 5 TABLET ORAL at 13:56

## 2023-03-26 RX ADMIN — OXYCODONE HYDROCHLORIDE 10 MILLIGRAM(S): 5 TABLET ORAL at 04:30

## 2023-03-26 RX ADMIN — FAMOTIDINE 20 MILLIGRAM(S): 10 INJECTION INTRAVENOUS at 12:56

## 2023-03-26 RX ADMIN — OXYCODONE HYDROCHLORIDE 10 MILLIGRAM(S): 5 TABLET ORAL at 23:40

## 2023-03-26 RX ADMIN — OXYCODONE HYDROCHLORIDE 10 MILLIGRAM(S): 5 TABLET ORAL at 07:55

## 2023-03-26 RX ADMIN — Medication 1000 MILLIGRAM(S): at 06:14

## 2023-03-26 RX ADMIN — OXYCODONE HYDROCHLORIDE 10 MILLIGRAM(S): 5 TABLET ORAL at 20:40

## 2023-03-26 RX ADMIN — Medication 81 MILLIGRAM(S): at 06:14

## 2023-03-26 RX ADMIN — OXYCODONE HYDROCHLORIDE 10 MILLIGRAM(S): 5 TABLET ORAL at 03:43

## 2023-03-26 RX ADMIN — OXYCODONE HYDROCHLORIDE 10 MILLIGRAM(S): 5 TABLET ORAL at 11:47

## 2023-03-26 NOTE — PROGRESS NOTE ADULT - SUBJECTIVE AND OBJECTIVE BOX
ORTHOPEDIC PA PROGRESS NOTE  ANDREW AGUILAR      52y Female                                 SY [id number] [id number]                                                                                                                           POD #    2d    STATUS POST:       Procedure: Arthroplasty, knee, right, total, robot-assisted               Patient seen and examined at bedside.      Current Pain Management:    acetaminophen     Tablet .. 1000 milliGRAM(s) Oral every 8 hours  ALPRAZolam 0.5 milliGRAM(s) Oral three times a day PRN  HYDROmorphone   Tablet 2 milliGRAM(s) Oral every 3 hours PRN  HYDROmorphone   Tablet 4 milliGRAM(s) Oral every 3 hours PRN  HYDROmorphone  Injectable 0.5 milliGRAM(s) IV Push every 3 hours PRN  ondansetron Injectable 4 milliGRAM(s) IV Push every 6 hours PRN  temazepam 30 milliGRAM(s) Oral at bedtime PRN      T(F): 98.4  HR: 78  BP: 127/81  RR: 20  SpO2: 98%                         10.3   8.39  )-----------( 221      ( 26 Mar 2023 06:08 )             31.1         03-26    145  |  107  |  8   ----------------------------<  117<H>  4.1   |  32<H>  |  0.61    Ca    8.8      26 Mar 2023 06:08      Physical Exam :    -   Dressing C/D/I. Hector intact and working  -   Distal Neurvascular status intact grossly.   -   Warm well perfused; capillary refill <3 seconds   -   (+)EHL/FHL   -   (+) Sensation to light touch  -   (-) Calf tenderness Bilaterally      A/P: 52y Female s/p Arthroplasty, knee, right, total, robot-assisted       -   Ortho Stable  -   Pain control:  acetaminophen     Tablet .. 1000 milliGRAM(s) Oral every 8 hours  ALPRAZolam 0.5 milliGRAM(s) Oral three times a day PRN  HYDROmorphone   Tablet 2 milliGRAM(s) Oral every 3 hours PRN  HYDROmorphone   Tablet 4 milliGRAM(s) Oral every 3 hours PRN  HYDROmorphone  Injectable 0.5 milliGRAM(s) IV Push every 3 hours PRN  ondansetron Injectable 4 milliGRAM(s) IV Push every 6 hours PRN  temazepam 30 milliGRAM(s) Oral at bedtime PRN    -   Medicine to follow  -   DVT ppx:    PAS +  aspirin enteric coated: 81 milliGRAM(s) Oral, aspirin enteric coated: 81 milliGRAM(s) Oral  -   PT/OT OOB,  Weight bearing status: WBAT   -  Dispo: Home today   -   Prescribed Medications:  Aspirin Enteric Coated 81 mg oral delayed release tablet: 1 tab orally every 12 hours. Take aspirin 2 hours before Celebrex/Meloxicam.  Narcan 4 mg/0.1 mL nasal spray: Instill one spray into nostril as needed for drug overdose. May repeat dose in alternate nostril if needed in 2-3 minutes while awaiting EMS.    oxyCODONE 5 mg oral tablet: 1-2  tab(s) orally every 4 hours, As Needed  for moderate to severe pain     do not drive while taking or combine with other narcotics or sedating medications.     [facility] : [phone]  MDD:6

## 2023-03-26 NOTE — CASE MANAGEMENT PROGRESS NOTE - NSCMPROGRESSNOTE_GEN_ALL_CORE
CM met w/ patient and her spouse at bedside to review her discharge plans for today.    Patient stated that: "Per the PA who saw me this morning, I cannot go home until I am seen by Dr Read tomorrow Monday because my knee is not bending".  CM outreached the PA Memo Cardenas for clarification, the PA stated he put out a call to his chief and Dr Read and he is waiting for further instructions from them and he will notify the primary nurse if to discharge the patient.  The primary nurse was made aware that the patient cannot be discharged home until she receives clarification from the PA.    CM delivered a rolling walker to the patient.    Patient will be taken home by her spouse when ready.  Pt and spouse are agreeable with d/c plan.

## 2023-03-26 NOTE — PROGRESS NOTE ADULT - SUBJECTIVE AND OBJECTIVE BOX
Patient is a 52y old  Female who presents with a chief complaint of right knee pain.    HPI: 52 years old female with past medical history of hypothyroidism, hyperlipidemia,  tracheomalacia, IBS, anxiety, insomnia, allergic rhinitis, GERD, esophageal dysmotility, SVT, vitamin D deficiency, history of arthroscopic surgery of the right knee, osteoarthritis of the knee, who is been having increasing pain in her right knee.  Patient had failed outpatient treatment with conservative measures.  She was recommended right total knee arthroplasty.  Patient is seen postoperatively for medical comanagement.    INTERVAL HPI/OVERNIGHT EVENTS:  Chart reviewed, notes reviewed.  Patient seen and examined.  Ambulated with PT.   Doing incentive spirometry on regular basis.  Pain Location & Control: right knee, not very well controlled.    03/25/2023 --> events noted, patient had increasing pain in her right knee and leg.  She had Doppler of lower extreme the stone.  Her pain medication regimen was changed.  She denies any chest pain or chest pressure.  She denies any nausea or vomiting.  She denies any fevers or chills.    03/26/2023 --> Feeling slightly better. pain is fairly controlled with meds (back on Oxycodone as she was getting dizzy and lightheaded with Dilaudid. No Chest pain or pressure.     PAST MEDICAL & SURGICAL HISTORY:  Hypothyroidism  IBS (irritable bowel syndrome)  Anxiety  Low serum vitamin D  Allergic rhinitis, seasonal  GERD (gastroesophageal reflux disease)  Esophageal dysmotility  HLD (hyperlipidemia)  Osteoarthritis of right knee  SVT (supraventricular tachycardia)  History of hyperkalemia  History of abdominoplasty  2012, mini  History of parotidectomy  left  History of carpal tunnel release  left  H/O arthroscopy of right knee  Status post arthroscopy of hip  left  History of cataract surgery  bilateral  H/O bilateral breast reduction surgery  History of lipoma  removed from left shoulder  H/O tubal ligation  bilateral  History of bladder surgery  sling  Status post right breast lumpectomy  1996  S/P tracheoplasty  2018 can use size 7 ET tube "I have been told I am totally fine now"  H/O parotidectomy  H/O shoulder surgery  left  S/P cervical spinal fusion    Allergies:  codeine (Urticaria)  Hycodan (Rash)  penicillin (Rash; Other)  prednisoLONE (Swelling; Other)    Intolerances    atorvastatin (Muscle Pain)  niacin (Flushing)  rosuvastatin (Muscle Pain)  Vicodin (Pruritus)      Home Medications:  acetaminophen 500 mg oral tablet: 2 tab(s) orally every 8 hours (24 Mar 2023 18:14)  levothyroxine 25 mcg (0.025 mg) oral tablet: 1 tab(s) orally once a day (24 Mar 2023 07:53)  Linzess 290 mcg oral capsule: 1 cap(s) orally once a day (24 Mar 2023 07:53)  Ozempic 2 mg/1.5 mL (0.25 mg or 0.5 mg dose) subcutaneous solution: subcutaneous once a week (24 Mar 2023 07:53)  pantoprazole 40 mg oral granule, delayed release: 1 each orally 2 times a day (24 Mar 2023 07:53)  Pepcid 20 mg oral tablet: 1 tab(s) orally 2 times a day (24 Mar 2023 07:53)  Praluent Pen 75 mg/mL subcutaneous solution: subcutaneous 2 times a month (24 Mar 2023 07:53)  temazepam 30 mg oral capsule: 1 cap(s) orally once a day (at bedtime) (24 Mar 2023 07:53)  Xanax 0.5 mg oral tablet: 1 tab(s) orally 3 times a day, As Needed (24 Mar 2023 07:53)    MEDICATIONS  (STANDING):  acetaminophen     Tablet .. 1000 milliGRAM(s) Oral every 8 hours  aspirin enteric coated 81 milliGRAM(s) Oral every 12 hours  famotidine    Tablet 20 milliGRAM(s) Oral daily  lactated ringers. 1000 milliLiter(s) (125 mL/Hr) IV Continuous <Continuous>  levothyroxine 25 MICROGram(s) Oral daily  pantoprazole    Tablet 40 milliGRAM(s) Oral every 12 hours  polyethylene glycol 3350 17 Gram(s) Oral at bedtime  senna 2 Tablet(s) Oral at bedtime    MEDICATIONS  (PRN):  ALPRAZolam 0.5 milliGRAM(s) Oral three times a day PRN anxiety  bisacodyl Suppository 10 milliGRAM(s) Rectal once PRN Constipation  diazepam    Tablet 5 milliGRAM(s) Oral every 8 hours PRN muscle spasm  HYDROmorphone  Injectable 0.5 milliGRAM(s) IV Push every 3 hours PRN breakthrough pain  magnesium hydroxide Suspension 30 milliLiter(s) Oral daily PRN Constipation  ondansetron Injectable 4 milliGRAM(s) IV Push every 6 hours PRN Nausea and/or Vomiting  oxyCODONE    IR 5 milliGRAM(s) Oral every 3 hours PRN Moderate Pain (4 - 6)  oxyCODONE    IR 10 milliGRAM(s) Oral every 3 hours PRN Severe Pain (7 - 10)  temazepam 30 milliGRAM(s) Oral at bedtime PRN Insomnia      REVIEW OF SYSTEMS:  CONSTITUTIONAL: No fever, weight loss, or fatigue  EYES: No eye pain,  or discharge  ENMT:  No sinus or throat pain  NECK: No pain or stiffness  BREASTS: No pain, masses, or nipple discharge  RESPIRATORY: No cough, wheezing, chills or hemoptysis; No shortness of breath  CARDIOVASCULAR: No chest pain, palpitations, dizziness, or leg swelling  GASTROINTESTINAL: No abdominal or epigastric pain. No nausea, vomiting.  GENITOURINARY: No dysuria, frequency, hematuria, or incontinence  NEUROLOGICAL: No headaches, memory loss, loss of strength, numbness, or tremors  SKIN: No itching, burning, rashes, or lesions   LYMPH NODES: No enlarged glands  ENDOCRINE: No polydipsia or polyuria  MUSCULOSKELETAL: right knee pain.  PSYCHIATRIC: positive for anxiety.  HEME/LYMPH: No easy bruising, or bleeding gums  ALLERGY AND IMMUNOLOGIC: No hives or eczema    Vital Signs Last 24 Hrs  T(C): 36.9 (26 Mar 2023 07:41), Max: 37.2 (25 Mar 2023 23:11)  T(F): 98.4 (26 Mar 2023 07:41), Max: 98.9 (25 Mar 2023 23:11)  HR: 78 (26 Mar 2023 07:41) (78 - 88)  BP: 127/81 (26 Mar 2023 07:41) (109/68 - 134/79)  BP(mean): --  RR: 20 (26 Mar 2023 07:41) (17 - 20)  SpO2: 98% (26 Mar 2023 07:41) (94% - 100%)    Parameters below as of 26 Mar 2023 07:41  Patient On (Oxygen Delivery Method): room air    PHYSICAL EXAM:  GENERAL: NAD, well-groomed, well-developed  HEAD:  Atraumatic, Normocephalic  EYES: Pallor +  ENMT: Moist mucous membranes,  No lesions  NECK: Supple,   CHEST/LUNG: Clear to auscultation bilaterally; No rales, rhonchi, wheezing, or rubs  HEART: Regular rate and rhythm; No murmurs, rubs, or gallops  ABDOMEN: Soft, Nontender, Nondistended; Bowel sounds present  EXTREMITIES:  2+ Peripheral Pulses, No clubbing or cyanosis  LYMPH: No lymphadenopathy noted  SKIN: No rashes or lesions  INCISION:  Dressing dry and intact  NERVOUS SYSTEM:  No focal deficit.   PSYCH: AAO X 3, good concentration.     LABS:                        10.3   8.39  )-----------( 221      ( 26 Mar 2023 06:08 )             31.1     26 Mar 2023 06:08    145    |  107    |  8      ----------------------------<  117    4.1     |  32     |  0.61     Ca    8.8        26 Mar 2023 06:08    RADIOLOGY & ADDITIONAL TESTS:    < from: US Duplex Venous Lower Ext Ltd, Right (03.25.23 @ 11:38) >  ACC: [id number] EXAM:  US DPLX LWR EXT VEINS LTD RT   ORDERED BY: TL GORMAN     PROCEDURE DATE:  03/25/2023          INTERPRETATION:  CLINICAL INFORMATION: Right calf pain    COMPARISON: None available.    TECHNIQUE: Duplex sonography of theRIGHT LOWER extremity veins with   color and spectral Doppler, with and without compression.    FINDINGS:    Eccentric wall thickening of the proximal deep femoral vein with patent   flow, likely sequela of postthrombotic change.  There is normal compressibility of the right common femoral, femoral and   popliteal veins.  The contralateral common femoral vein is patent.  Doppler examination shows normal spontaneous and phasic flow.    No calf vein thrombosis is detected.  3.4 cm Baker cyst.    IMPRESSION:  No evidence of an acute right lower extremity deep venous thrombosis.    < end of copied text >  < from: US Duplex Venous Lower Ext Ltd, Left (03.25.23 @ 20:01) >  ACC: [id number] EXAM:  US DPLX LWR EXT VEINS LTD LT   ORDERED BY: CPOE   PROVIDER BACKLOAD     PROCEDURE DATE:  03/25/2023          INTERPRETATION:  CLINICAL INFORMATION: Status post orthopedic surgery    COMPARISON: None available.    TECHNIQUE: Duplex sonography of the LEFT LOWER extremity veins with color   and spectral Doppler, with and without compression.    FINDINGS:    There is normal compressibility of the left common femoral, femoral and   popliteal veins.  The contralateral common femoral vein is patent.  Doppler examination shows normal spontaneous and phasic flow.    No calf vein thrombosis is detected.    IMPRESSION:  No evidence of left lower extremity deep venous thrombosis.    --- End of Report ---    < end of copied text >    Imaging Personally Reviewed:  [ ] YES      Consultant(s) Notes Reviewed: Yes    Care Discussed with Consultants/Other Providers: Yes

## 2023-03-27 VITALS
OXYGEN SATURATION: 99 % | DIASTOLIC BLOOD PRESSURE: 70 MMHG | TEMPERATURE: 98 F | HEART RATE: 73 BPM | SYSTOLIC BLOOD PRESSURE: 109 MMHG | RESPIRATION RATE: 16 BRPM

## 2023-03-27 LAB
ANION GAP SERPL CALC-SCNC: 6 MMOL/L — SIGNIFICANT CHANGE UP (ref 5–17)
BUN SERPL-MCNC: 9 MG/DL — SIGNIFICANT CHANGE UP (ref 7–23)
CALCIUM SERPL-MCNC: 8.8 MG/DL — SIGNIFICANT CHANGE UP (ref 8.4–10.5)
CHLORIDE SERPL-SCNC: 102 MMOL/L — SIGNIFICANT CHANGE UP (ref 96–108)
CO2 SERPL-SCNC: 32 MMOL/L — HIGH (ref 22–31)
CREAT SERPL-MCNC: 0.64 MG/DL — SIGNIFICANT CHANGE UP (ref 0.5–1.3)
EGFR: 105 ML/MIN/1.73M2 — SIGNIFICANT CHANGE UP
GLUCOSE SERPL-MCNC: 91 MG/DL — SIGNIFICANT CHANGE UP (ref 70–99)
HCT VFR BLD CALC: 31.5 % — LOW (ref 34.5–45)
HGB BLD-MCNC: 10.3 G/DL — LOW (ref 11.5–15.5)
MCHC RBC-ENTMCNC: 30.4 PG — SIGNIFICANT CHANGE UP (ref 27–34)
MCHC RBC-ENTMCNC: 32.7 GM/DL — SIGNIFICANT CHANGE UP (ref 32–36)
MCV RBC AUTO: 92.9 FL — SIGNIFICANT CHANGE UP (ref 80–100)
NRBC # BLD: 0 /100 WBCS — SIGNIFICANT CHANGE UP (ref 0–0)
PLATELET # BLD AUTO: 234 K/UL — SIGNIFICANT CHANGE UP (ref 150–400)
POTASSIUM SERPL-MCNC: 4.4 MMOL/L — SIGNIFICANT CHANGE UP (ref 3.5–5.3)
POTASSIUM SERPL-SCNC: 4.4 MMOL/L — SIGNIFICANT CHANGE UP (ref 3.5–5.3)
RBC # BLD: 3.39 M/UL — LOW (ref 3.8–5.2)
RBC # FLD: 13.2 % — SIGNIFICANT CHANGE UP (ref 10.3–14.5)
SODIUM SERPL-SCNC: 140 MMOL/L — SIGNIFICANT CHANGE UP (ref 135–145)
WBC # BLD: 7.99 K/UL — SIGNIFICANT CHANGE UP (ref 3.8–10.5)
WBC # FLD AUTO: 7.99 K/UL — SIGNIFICANT CHANGE UP (ref 3.8–10.5)

## 2023-03-27 PROCEDURE — 97110 THERAPEUTIC EXERCISES: CPT

## 2023-03-27 PROCEDURE — 97165 OT EVAL LOW COMPLEX 30 MIN: CPT

## 2023-03-27 PROCEDURE — C1713: CPT

## 2023-03-27 PROCEDURE — 85027 COMPLETE CBC AUTOMATED: CPT

## 2023-03-27 PROCEDURE — 73560 X-RAY EXAM OF KNEE 1 OR 2: CPT

## 2023-03-27 PROCEDURE — 80048 BASIC METABOLIC PNL TOTAL CA: CPT

## 2023-03-27 PROCEDURE — 97116 GAIT TRAINING THERAPY: CPT

## 2023-03-27 PROCEDURE — 97530 THERAPEUTIC ACTIVITIES: CPT

## 2023-03-27 PROCEDURE — 93971 EXTREMITY STUDY: CPT

## 2023-03-27 PROCEDURE — 97161 PT EVAL LOW COMPLEX 20 MIN: CPT

## 2023-03-27 PROCEDURE — 83880 ASSAY OF NATRIURETIC PEPTIDE: CPT

## 2023-03-27 PROCEDURE — C1776: CPT

## 2023-03-27 PROCEDURE — 36415 COLL VENOUS BLD VENIPUNCTURE: CPT

## 2023-03-27 RX ADMIN — OXYCODONE HYDROCHLORIDE 10 MILLIGRAM(S): 5 TABLET ORAL at 03:41

## 2023-03-27 RX ADMIN — Medication 25 MICROGRAM(S): at 05:42

## 2023-03-27 RX ADMIN — Medication 81 MILLIGRAM(S): at 05:46

## 2023-03-27 RX ADMIN — OXYCODONE HYDROCHLORIDE 10 MILLIGRAM(S): 5 TABLET ORAL at 07:15

## 2023-03-27 RX ADMIN — Medication 1000 MILLIGRAM(S): at 05:43

## 2023-03-27 RX ADMIN — OXYCODONE HYDROCHLORIDE 10 MILLIGRAM(S): 5 TABLET ORAL at 06:45

## 2023-03-27 RX ADMIN — OXYCODONE HYDROCHLORIDE 10 MILLIGRAM(S): 5 TABLET ORAL at 10:40

## 2023-03-27 RX ADMIN — PANTOPRAZOLE SODIUM 40 MILLIGRAM(S): 20 TABLET, DELAYED RELEASE ORAL at 05:42

## 2023-03-27 RX ADMIN — OXYCODONE HYDROCHLORIDE 10 MILLIGRAM(S): 5 TABLET ORAL at 09:50

## 2023-03-27 RX ADMIN — OXYCODONE HYDROCHLORIDE 10 MILLIGRAM(S): 5 TABLET ORAL at 04:30

## 2023-03-27 RX ADMIN — Medication 1000 MILLIGRAM(S): at 05:44

## 2023-03-27 NOTE — PROGRESS NOTE ADULT - PROBLEM SELECTOR PLAN 1
Patient is s/p right total knee arthroplasty, post op day # 3.  Continue patient on ASA for DVT prophylaxis.  Continue multimodal pain management with Tylenol and oxycodone as needed.   No NSAIDs due to patient's history of hyperkalemia on NSAIDs as per her nephrologist on outside.   Continue physical therapy.  Encourage incentive spirometry.  Monitor for post op anemia  and post op fever.   patient is advised of the results of Doppler.  She has a Baker's cyst behind her right knee.  Management will be as per orthopedics.  Continue physical therapy.  Patient is medically cleared for discharge once OK with orthopedics.
Patient is s/p right total knee arthroplasty, post op day # 2.  Continue patient on ASA for DVT prophylaxis.  Continue multimodal pain management with Tylenol and oxycodone as needed.   Continue physical therapy.  Encourage incentive spirometry.  Monitor for post op anemia  and post op fever.   patient is advised of the results of Doppler.  She has a Baker's cyst behind her right knee.  Management will be as per orthopedics.  Continue physical therapy.  Patient is medically cleared for discharge once OK with orthopedics.
Patient is s/p right total knee arthroplasty, post op day # 1.  Continue patient on ASA for DVT prophylaxis.  Continue multimodal pain management with Tylenol and Dilaudid as needed.   Continue physical therapy.  Encourage incentive spirometry.  Monitor for post op anemia  and post op fever.   patient is advised of the results of Doppler.  She has a Baker's cyst behind her right knee.  Management will be as per orthopedics.  Continue physical therapy.  Further management as per patient's clinical course.

## 2023-03-27 NOTE — PROGRESS NOTE ADULT - SUBJECTIVE AND OBJECTIVE BOX
Procedure: Right TKR  POD #: 3  S: Pt without complaints. No SOB,CP, N/V. Tolerated Diet well. Knee Pain comfortable (4/10- at rest in bed) on  Interval Rx. Tolerating oxycodone 10 mg well.   No BM yet, + flatus, No abdominal pain or discomfort.   PT activity yesterday: Walked with walker  Pain Rx:  acetaminophen     Tablet .. 1000 milliGRAM(s) Oral every 8 hours  ALPRAZolam 0.5 milliGRAM(s) Oral three times a day PRN  diazepam    Tablet 5 milliGRAM(s) Oral every 8 hours PRN  HYDROmorphone  Injectable 0.5 milliGRAM(s) IV Push every 3 hours PRN  ondansetron Injectable 4 milliGRAM(s) IV Push every 6 hours PRN  oxyCODONE    IR 5 milliGRAM(s) Oral every 3 hours PRN  oxyCODONE    IR 10 milliGRAM(s) Oral every 3 hours PRN  temazepam 30 milliGRAM(s) Oral at bedtime PRN    O: General: On exam, No Apparent Distress  Vital Signs Last 24 Hrs  T(C): 36.9 (27 Mar 2023 07:40), Max: 37.4 (26 Mar 2023 15:45)  T(F): 98.5 (27 Mar 2023 07:40), Max: 99.4 (26 Mar 2023 15:45)  HR: 73 (27 Mar 2023 07:40) (73 - 96)  BP: 109/70 (27 Mar 2023 07:40) (109/70 - 143/83)  RR: 16 (27 Mar 2023 07:40) (16 - 19)  SpO2: 99% (27 Mar 2023 07:40) (97% - 99%)    [Abdomen]: + BS, soft , benign exam   Ext(Knee): Right Knee [ RUDDY ] Dressing[Incision] clean, dry, & intact, scant blood stain on sponge; No  cellulitis; Min effusion[soft]. Minimal soft tissue swelling knee/ thigh; No fluctuance, No crepitus.   ROM: Extension 0 deg. ; Flexion 40 deg. PROM in bed this am, flexion 80 deg when got out of bed earlier; pain in quad area after knee PROM improved - low pain threshold   Neurologic:  Has sensation over feet & toes bilat. Full AROM bilat feet & toes. EHL/AT = 5/5  Vascular: Feet toes warm, pink. DP = 2+. No calf tenderness bilat..  Urine Out [11P-7A] = voided; HVAC = N/A    VTEP: On Bilat. Venodynes + aspirin enteric coated 81 milliGRAM(s) Oral every 12 hours    Activity in PT yesterday Noted.[Walked 100 ft. with walker]. [Sat up independtly].    Hospitalist input noted yesterday.    Labs Today:          CBC:                10.3   7.99  )-----------( 234      ( 27 Mar 2023 07:00 )             31.5       03-27  Chem:    140  |  102  |  9   ----------------------------<  91  4.4   |  32<H>  |  0.64    Ca    8.8      27 Mar 2023 07:00        Primary Orthopedic Assessment:  • Stable from Orthopedic perspective; low pain threshold  • Neuro motor exam LE stable  • Labs: CBC with min post-op anemia / Chem stable      Plan:   • Continue:  PT/OT/WBAT with assistance of a walker/Ice to knee/ Knee ROM       • Continue DVT prophylaxis as prescribed, including use of compression devices and ankle pumps  • Continue Pain Rx  • Plans per Medicine   • Discharge planning – anticipated discharge is Home D/C with Home care & Home PT when medically stable & cleared by PT/OT; will attempt contact with outpatient nephrologist Dr. Dozier to see if he will allow NSAID for multimodal post-op pain management which she would benefit from.

## 2023-03-27 NOTE — PROGRESS NOTE ADULT - SUBJECTIVE AND OBJECTIVE BOX
Patient is a 52y old  Female who presents with a chief complaint of right knee pain.    HPI: 52 years old female with past medical history of hypothyroidism, hyperlipidemia,  tracheomalacia, IBS, anxiety, insomnia, allergic rhinitis, GERD, esophageal dysmotility, SVT, vitamin D deficiency, history of arthroscopic surgery of the right knee, osteoarthritis of the knee, who is been having increasing pain in her right knee.  Patient had failed outpatient treatment with conservative measures.  She was recommended right total knee arthroplasty.  Patient is seen postoperatively for medical comanagement.    INTERVAL HPI/OVERNIGHT EVENTS:  Chart reviewed, notes reviewed.  Patient seen and examined.  Ambulated with PT.   Doing incentive spirometry on regular basis.  Pain Location & Control: right knee, not very well controlled.    03/25/2023 --> events noted, patient had increasing pain in her right knee and leg.  She had Doppler of lower extreme the stone.  Her pain medication regimen was changed.  She denies any chest pain or chest pressure.  She denies any nausea or vomiting.  She denies any fevers or chills.  03/26/2023 --> Feeling slightly better. pain is fairly controlled with meds (back on Oxycodone as she was getting dizzy and lightheaded with Dilaudid. No Chest pain or pressure.     03/27/2023 --> Doing better. Pain is controlled. Still having pain on flexion. No chest pain or pressure. No BM yet.     PAST MEDICAL & SURGICAL HISTORY:  Hypothyroidism  IBS (irritable bowel syndrome)  Anxiety  Low serum vitamin D  Allergic rhinitis, seasonal  GERD (gastroesophageal reflux disease)  Esophageal dysmotility  HLD (hyperlipidemia)  Osteoarthritis of right knee  SVT (supraventricular tachycardia)  History of hyperkalemia  History of abdominoplasty  2012, mini  History of parotidectomy  left  History of carpal tunnel release  left  H/O arthroscopy of right knee  Status post arthroscopy of hip  left  History of cataract surgery  bilateral  H/O bilateral breast reduction surgery  History of lipoma  removed from left shoulder  H/O tubal ligation  bilateral  History of bladder surgery  sling  Status post right breast lumpectomy  1996  S/P tracheoplasty  2018 can use size 7 ET tube "I have been told I am totally fine now"  H/O parotidectomy  H/O shoulder surgery  left  S/P cervical spinal fusion    Allergies:  codeine (Urticaria)  Hycodan (Rash)  penicillin (Rash; Other)  prednisoLONE (Swelling; Other)    Intolerances    atorvastatin (Muscle Pain)  niacin (Flushing)  rosuvastatin (Muscle Pain)  Vicodin (Pruritus)      Home Medications:  acetaminophen 500 mg oral tablet: 2 tab(s) orally every 8 hours (24 Mar 2023 18:14)  levothyroxine 25 mcg (0.025 mg) oral tablet: 1 tab(s) orally once a day (24 Mar 2023 07:53)  Linzess 290 mcg oral capsule: 1 cap(s) orally once a day (24 Mar 2023 07:53)  Ozempic 2 mg/1.5 mL (0.25 mg or 0.5 mg dose) subcutaneous solution: subcutaneous once a week (24 Mar 2023 07:53)  pantoprazole 40 mg oral granule, delayed release: 1 each orally 2 times a day (24 Mar 2023 07:53)  Pepcid 20 mg oral tablet: 1 tab(s) orally 2 times a day (24 Mar 2023 07:53)  Praluent Pen 75 mg/mL subcutaneous solution: subcutaneous 2 times a month (24 Mar 2023 07:53)  temazepam 30 mg oral capsule: 1 cap(s) orally once a day (at bedtime) (24 Mar 2023 07:53)  Xanax 0.5 mg oral tablet: 1 tab(s) orally 3 times a day, As Needed (24 Mar 2023 07:53)    MEDICATIONS  (STANDING):  acetaminophen     Tablet .. 1000 milliGRAM(s) Oral every 8 hours  aspirin enteric coated 81 milliGRAM(s) Oral every 12 hours  famotidine    Tablet 20 milliGRAM(s) Oral daily  lactated ringers. 1000 milliLiter(s) (125 mL/Hr) IV Continuous <Continuous>  levothyroxine 25 MICROGram(s) Oral daily  pantoprazole    Tablet 40 milliGRAM(s) Oral every 12 hours  polyethylene glycol 3350 17 Gram(s) Oral at bedtime  senna 2 Tablet(s) Oral at bedtime    MEDICATIONS  (PRN):  ALPRAZolam 0.5 milliGRAM(s) Oral three times a day PRN anxiety  bisacodyl Suppository 10 milliGRAM(s) Rectal once PRN Constipation  diazepam    Tablet 5 milliGRAM(s) Oral every 8 hours PRN muscle spasm  HYDROmorphone  Injectable 0.5 milliGRAM(s) IV Push every 3 hours PRN breakthrough pain  magnesium hydroxide Suspension 30 milliLiter(s) Oral daily PRN Constipation  ondansetron Injectable 4 milliGRAM(s) IV Push every 6 hours PRN Nausea and/or Vomiting  oxyCODONE    IR 5 milliGRAM(s) Oral every 3 hours PRN Moderate Pain (4 - 6)  oxyCODONE    IR 10 milliGRAM(s) Oral every 3 hours PRN Severe Pain (7 - 10)  temazepam 30 milliGRAM(s) Oral at bedtime PRN Insomnia    REVIEW OF SYSTEMS:  CONSTITUTIONAL: No fever, weight loss, or fatigue  EYES: No eye pain,  or discharge  ENMT:  No sinus or throat pain  NECK: No pain or stiffness  BREASTS: No pain, masses, or nipple discharge  RESPIRATORY: No cough, wheezing, chills or hemoptysis; No shortness of breath  CARDIOVASCULAR: No chest pain, palpitations, dizziness, or leg swelling  GASTROINTESTINAL: No abdominal or epigastric pain. No nausea, vomiting.  GENITOURINARY: No dysuria, frequency, hematuria, or incontinence  NEUROLOGICAL: No headaches, memory loss, loss of strength, numbness, or tremors  SKIN: No itching, burning, rashes, or lesions   LYMPH NODES: No enlarged glands  ENDOCRINE: No polydipsia or polyuria  MUSCULOSKELETAL: right knee pain.  PSYCHIATRIC: positive for anxiety.  HEME/LYMPH: No easy bruising, or bleeding gums  ALLERGY AND IMMUNOLOGIC: No hives or eczema    Vital Signs Last 24 Hrs  T(C): 36.9 (27 Mar 2023 07:40), Max: 37.4 (26 Mar 2023 15:45)  T(F): 98.5 (27 Mar 2023 07:40), Max: 99.4 (26 Mar 2023 15:45)  HR: 73 (27 Mar 2023 07:40) (73 - 96)  BP: 109/70 (27 Mar 2023 07:40) (109/70 - 143/83)  BP(mean): --  RR: 16 (27 Mar 2023 07:40) (16 - 19)  SpO2: 99% (27 Mar 2023 07:40) (97% - 99%)    Parameters below as of 26 Mar 2023 23:39  Patient On (Oxygen Delivery Method): room air    PHYSICAL EXAM:  GENERAL: NAD, well-groomed, well-developed  HEAD:  Atraumatic, Normocephalic  EYES: Pallor +  ENMT: Moist mucous membranes,  No lesions  NECK: Supple,   CHEST/LUNG: Clear to auscultation bilaterally; No rales, rhonchi, wheezing, or rubs  HEART: Regular rate and rhythm; No murmurs, rubs, or gallops  ABDOMEN: Soft, Nontender, Nondistended; Bowel sounds present  EXTREMITIES:  2+ Peripheral Pulses, No clubbing or cyanosis  LYMPH: No lymphadenopathy noted  SKIN: No rashes or lesions  INCISION:  Dressing dry and intact  NERVOUS SYSTEM:  No focal deficit.   PSYCH: AAO X 3, good concentration.     LABS:                        10.3   7.99  )-----------( 234      ( 27 Mar 2023 07:00 )             31.5     27 Mar 2023 07:00    140    |  102    |  9      ----------------------------<  91     4.4     |  32     |  0.64     Ca    8.8        27 Mar 2023 07:00    RADIOLOGY & ADDITIONAL TESTS:    < from: US Duplex Venous Lower Ext Ltd, Right (03.25.23 @ 11:38) >  ACC: [id number] EXAM:  US DPLX LWR EXT VEINS LTD RT   ORDERED BY: TL GORMAN     PROCEDURE DATE:  03/25/2023          INTERPRETATION:  CLINICAL INFORMATION: Right calf pain    COMPARISON: None available.    TECHNIQUE: Duplex sonography of theRIGHT LOWER extremity veins with   color and spectral Doppler, with and without compression.    FINDINGS:    Eccentric wall thickening of the proximal deep femoral vein with patent   flow, likely sequela of postthrombotic change.  There is normal compressibility of the right common femoral, femoral and   popliteal veins.  The contralateral common femoral vein is patent.  Doppler examination shows normal spontaneous and phasic flow.    No calf vein thrombosis is detected.  3.4 cm Baker cyst.    IMPRESSION:  No evidence of an acute right lower extremity deep venous thrombosis.    < end of copied text >  < from: US Duplex Venous Lower Ext Ltd, Left (03.25.23 @ 20:01) >  ACC: [id number] EXAM:  US DPLX LWR EXT VEINS LTD LT   ORDERED BY: CPOE   PROVIDER BACKLOAD     PROCEDURE DATE:  03/25/2023          INTERPRETATION:  CLINICAL INFORMATION: Status post orthopedic surgery    COMPARISON: None available.    TECHNIQUE: Duplex sonography of the LEFT LOWER extremity veins with color   and spectral Doppler, with and without compression.    FINDINGS:    There is normal compressibility of the left common femoral, femoral and   popliteal veins.  The contralateral common femoral vein is patent.  Doppler examination shows normal spontaneous and phasic flow.    No calf vein thrombosis is detected.    IMPRESSION:  No evidence of left lower extremity deep venous thrombosis.    --- End of Report ---    < end of copied text >    Imaging Personally Reviewed:  [ ] YES      Consultant(s) Notes Reviewed: Yes    Care Discussed with Consultants/Other Providers: Yes

## 2023-03-27 NOTE — PROGRESS NOTE ADULT - PROBLEM SELECTOR PLAN 6
continue temazepam as needed.  Patient is again encouraged to wean off benzodiazepines as much as possible while taking narcotic pain medications.

## 2023-03-27 NOTE — CASE MANAGEMENT PROGRESS NOTE - NSCMPROGRESSNOTE_GEN_ALL_CORE
Per treatment team pt. is cleared by PT/OT and medical team for transition to home with home care PT today 3/27/23 with start of care 3/28/23 with [facility] care.  CM met with pt. who is looking forward to going home and states spouse Arslan will transport when ready.  Informed pt. she has co-payments with HC visit  has a %5 COINS SN $14.01 PT/OT/ST/SW $10.90 and pt. is agreeable.  CM sent f2f and d/c notes to [facility] .  pt. states RW was delivered this weekend to bedside.  CM following.

## 2023-03-27 NOTE — PROGRESS NOTE ADULT - NSPROGADDITIONALINFOA_GEN_ALL_CORE
discussed with patient and her  at bedside in detail.    Medically stable for discharge.
discussed with patient and her  at bedside in detail.    Medically stable for discharge.
discussed with patient and her  at bedside in detail.

## 2023-03-27 NOTE — PROGRESS NOTE ADULT - PROBLEM SELECTOR PLAN 5
continue patient on Xanax as needed.

## 2023-03-27 NOTE — PROGRESS NOTE ADULT - PROBLEM SELECTOR PLAN 4
continue Protonix and famotidine.

## 2023-03-27 NOTE — PROGRESS NOTE ADULT - PROBLEM SELECTOR PLAN 7
aspirin twice a day for DVT prophylaxis.  PPI for GI prophylaxis.

## 2023-03-27 NOTE — PROGRESS NOTE ADULT - PROBLEM SELECTOR PLAN 2
Patient with anemia of acute postop blood loss.  Patient is asymptomatic.  Continue to monitor serial CBC and transfuse as needed.

## 2023-03-29 ENCOUNTER — FORM ENCOUNTER (OUTPATIENT)
Age: 53
End: 2023-03-29

## 2023-03-30 ENCOUNTER — FORM ENCOUNTER (OUTPATIENT)
Age: 53
End: 2023-03-30

## 2023-04-02 ENCOUNTER — FORM ENCOUNTER (OUTPATIENT)
Age: 53
End: 2023-04-02

## 2023-04-03 ENCOUNTER — NON-APPOINTMENT (OUTPATIENT)
Age: 53
End: 2023-04-03

## 2023-04-06 ENCOUNTER — FORM ENCOUNTER (OUTPATIENT)
Age: 53
End: 2023-04-06

## 2023-04-11 ENCOUNTER — FORM ENCOUNTER (OUTPATIENT)
Age: 53
End: 2023-04-11

## 2023-04-11 ENCOUNTER — APPOINTMENT (OUTPATIENT)
Dept: ORTHOPEDIC SURGERY | Facility: CLINIC | Age: 53
End: 2023-04-11
Payer: COMMERCIAL

## 2023-04-11 VITALS — BODY MASS INDEX: 23.39 KG/M2 | WEIGHT: 137 LBS | HEIGHT: 64 IN

## 2023-04-11 PROCEDURE — 73562 X-RAY EXAM OF KNEE 3: CPT | Mod: RT

## 2023-04-11 PROCEDURE — 99024 POSTOP FOLLOW-UP VISIT: CPT

## 2023-04-11 NOTE — ASSESSMENT
[FreeTextEntry1] : S/P RT TKA 3/24/23: NO F/C/S. DOING WELL. HAS RLE SWELLING. XRAYS REVIEWED WITH COMPONENTS WELL FIXED. NO SIGNS OF INFECTION. GOOD LIGAMENT BALANCE ON EXAM.  DISCUSSED THE IMPORTANCE OF ELEVATION TO REDUCE SWELLING. PROPER ELEVATION TECHNIQUES DISCUSSED. ABX AND PRECAUTIONS REVIEWED. PT RX. QUESTIONS ANSWERED. \par \par WILL ORDER STAT VENOUS DOPPLER RLE DUE TO SWELLING POST OP, R/O DVT.

## 2023-04-11 NOTE — IMAGING
[All Views] : anteroposterior, lateral [FreeTextEntry9] : DEGENERATIVE CHANGES [Right] : right knee [AP] : anteroposterior [Lateral] : lateral [[place]] : skyline [Components well fixed, in good position] : Components well fixed, in good position

## 2023-04-11 NOTE — PHYSICAL EXAM
[TWNoteComboBox7] : flexion 125 degrees [Right] : right knee [] : uses walker [FreeTextEntry3] : RLE SWELLING [FreeTextEntry9] : FLEXION TO 85º [de-identified] : extension 5 degrees

## 2023-04-11 NOTE — HISTORY OF PRESENT ILLNESS
[Result of repetitive motion] : result of repetitive motion [9] : 9 [Dull/Aching] : dull/aching [Sharp] : sharp [Constant] : constant [Household chores] : household chores [Leisure] : leisure [Rest] : rest [Walking] : walking [de-identified] : 1/31/23 Pt is here for RT knee pain since Dec 2022. Right knee meniscus repair done by Dr. Hart 6-7 yrs ago. NKI. \par \par 4/11/23 Pt is here for first post op s/p right TKA done on 3/24/2023 by Dr. Read. Pt states she has swelling in the right leg which is improving.  [] : no [FreeTextEntry1] : RT knee [FreeTextEntry7] : up and down the leg after activity [de-identified] : 1/25/23 [de-identified] : Dr. Hart [de-identified] : MRI, X-ray [de-identified] : Sx [de-identified] : 3/24/23 [de-identified] : Right TKA

## 2023-04-12 ENCOUNTER — RESULT REVIEW (OUTPATIENT)
Age: 53
End: 2023-04-12

## 2023-04-21 ENCOUNTER — RX RENEWAL (OUTPATIENT)
Age: 53
End: 2023-04-21

## 2023-04-25 ENCOUNTER — APPOINTMENT (OUTPATIENT)
Dept: OPHTHALMOLOGY | Facility: CLINIC | Age: 53
End: 2023-04-25

## 2023-04-30 ENCOUNTER — FORM ENCOUNTER (OUTPATIENT)
Age: 53
End: 2023-04-30

## 2023-05-04 PROBLEM — M17.11 UNILATERAL PRIMARY OSTEOARTHRITIS, RIGHT KNEE: Chronic | Status: ACTIVE | Noted: 2023-03-07

## 2023-05-04 PROBLEM — I47.1 SUPRAVENTRICULAR TACHYCARDIA: Chronic | Status: ACTIVE | Noted: 2023-03-07

## 2023-05-04 PROBLEM — E78.5 HYPERLIPIDEMIA, UNSPECIFIED: Chronic | Status: ACTIVE | Noted: 2023-03-07

## 2023-05-04 PROBLEM — Z86.39 PERSONAL HISTORY OF OTHER ENDOCRINE, NUTRITIONAL AND METABOLIC DISEASE: Chronic | Status: ACTIVE | Noted: 2023-03-07

## 2023-05-08 NOTE — ASU PATIENT PROFILE, ADULT - HEALTH/HEALTHCARE ANXIETIES, PROFILE
DISCHARGE PLANNING NOTE       Barrier to discharge: Ongoing Medication management to target MH symptoms, Symptom of Stabilization of mental health symptoms, and Aftercare coordination,      Today's Plan:    [facility] emailed [email or web address] to follow up on [facility] referral.    During team rounds staff reports pt has been improving on the unit. Dr. Reyes report if pt continues to improve, we can consider dual IOP as an options again.     [facility] called CPS  Tanya Miranda [phone] to get updates and she reports she would like to reach out to the pt  to coordinate before giving her an [facility] . [facility] gave her the contact information for pt  and that [facility] hasn't been able to make contact. Tanya report she will reach and consult with her supervisor about next steps for pt and follow up with [facility].    [facility] Faxed [phone] referral for [facility].     [facility] called Aunt Mayra [phone] and attempted to leave V/M but her mailbox was full.       Discharge plan or goal: Continue with medication management, placing after care referrals for [facility], [facility], and [facility], tentative discharge pending RTC placement, facilitating a family meeting for pending, on going collaboration with outpatient providers,        Care Rounds Attendance:   [facility]  RN   Charge RN   OT/TR  MD       preop anxiety

## 2023-05-10 RX ORDER — PROPRANOLOL HYDROCHLORIDE 10 MG/1
10 TABLET ORAL
Qty: 21 | Refills: 4 | Status: ACTIVE | COMMUNITY
Start: 2023-04-21 | End: 1900-01-01

## 2023-05-11 ENCOUNTER — APPOINTMENT (OUTPATIENT)
Dept: ORTHOPEDIC SURGERY | Facility: CLINIC | Age: 53
End: 2023-05-11
Payer: COMMERCIAL

## 2023-05-11 ENCOUNTER — APPOINTMENT (OUTPATIENT)
Dept: GASTROENTEROLOGY | Facility: CLINIC | Age: 53
End: 2023-05-11

## 2023-05-11 VITALS — HEIGHT: 64 IN | WEIGHT: 137 LBS | BODY MASS INDEX: 23.39 KG/M2

## 2023-05-11 PROCEDURE — 99024 POSTOP FOLLOW-UP VISIT: CPT

## 2023-05-11 NOTE — HISTORY OF PRESENT ILLNESS
[Result of repetitive motion] : result of repetitive motion [4] : 4 [Dull/Aching] : dull/aching [Sharp] : sharp [Constant] : constant [Household chores] : household chores [Leisure] : leisure [Rest] : rest [Walking] : walking [8] : 8 [de-identified] : 1/31/23 Pt is here for RT knee pain since Dec 2022. Right knee meniscus repair done by Dr. Hart 6-7 yrs ago. NKI. \par \par 4/11/23 Pt is here for first post op s/p right TKA done on 3/24/2023 by Dr. Read. Pt states she has swelling in the right leg which is improving. \par \par 5/11/23: Here for post op visit from sx on 3/24/23 - RT TKA. Currently doing PT. Notes improvement, but is concerned about progression. Intermittent numbness into shin.  [] : no [FreeTextEntry1] : RT knee [FreeTextEntry7] : up and down the leg after activity [de-identified] : 1/25/23 [de-identified] : Dr. Hart [de-identified] : MRI, X-ray [de-identified] : Sx [de-identified] : 3/24/23 [de-identified] : Right TKA

## 2023-05-11 NOTE — PHYSICAL EXAM
[Right] : right knee [] : patient ambulates without assistive device [TWNoteComboBox7] : flexion 120 degrees [de-identified] : extension 0 degrees

## 2023-05-11 NOTE — ASSESSMENT
[FreeTextEntry1] : S/P RT TKA 3/24/23: NO F/C/S. DOING WELL. HAS RLE SWELLING. XRAYS REVIEWED WITH COMPONENTS WELL FIXED. NO SIGNS OF INFECTION. GOOD LIGAMENT BALANCE ON EXAM.  DISCUSSED THE IMPORTANCE OF ELEVATION TO REDUCE SWELLING. PROPER ELEVATION TECHNIQUES DISCUSSED. ABX AND PRECAUTIONS REVIEWED. PT RX. QUESTIONS ANSWERED.

## 2023-05-11 NOTE — IMAGING
[Right] : right knee [AP] : anteroposterior [Lateral] : lateral [[place]] : skyline [Components well fixed, in good position] : Components well fixed, in good position

## 2023-05-12 ENCOUNTER — APPOINTMENT (OUTPATIENT)
Dept: ORTHOPEDIC SURGERY | Facility: CLINIC | Age: 53
End: 2023-05-12

## 2023-05-26 ENCOUNTER — RX RENEWAL (OUTPATIENT)
Age: 53
End: 2023-05-26

## 2023-05-26 ENCOUNTER — RESULT CHARGE (OUTPATIENT)
Age: 53
End: 2023-05-26

## 2023-05-26 ENCOUNTER — APPOINTMENT (OUTPATIENT)
Dept: ORTHOPEDIC SURGERY | Facility: CLINIC | Age: 53
End: 2023-05-26
Payer: COMMERCIAL

## 2023-05-26 VITALS — HEIGHT: 64 IN | WEIGHT: 137 LBS | BODY MASS INDEX: 23.39 KG/M2

## 2023-05-26 PROCEDURE — 99024 POSTOP FOLLOW-UP VISIT: CPT

## 2023-05-26 PROCEDURE — 73562 X-RAY EXAM OF KNEE 3: CPT | Mod: RT

## 2023-05-26 NOTE — ASSESSMENT
[FreeTextEntry1] : S/P RT TKA 3/24/23: NO F/C/S. HAS INTERMITTENT SWELLING. C/O INCREASED PAIN. XRAYS REVIEWED WITH COMPONENTS WELL FIXED. NO SIGNS OF INFECTION. GOOD LIGAMENT BALANCE ON EXAM.  DISCUSSED THE IMPORTANCE OF ELEVATION TO REDUCE SWELLING. PROPER ELEVATION TECHNIQUES DISCUSSED. ABX AND PRECAUTIONS AGAIN REVIEWED. PT RX. QUESTIONS ANSWERED. DICLOFENAC GEL RX. \par SHE WILL HOLD PT FOR 1-2 WEEKS. SHE WILL TAKE THE GABAPENTIN SHE HAS AT HOME.

## 2023-05-26 NOTE — PHYSICAL EXAM
[Right] : right knee [] : no sign of infection [TWNoteComboBox7] : flexion 110 degrees [de-identified] : extension 0 degrees

## 2023-05-26 NOTE — IMAGING
[Right] : right knee [AP] : anteroposterior [Lateral] : lateral [[place]] : skyline [Components well fixed, in good position] : Components well fixed, in good position

## 2023-05-26 NOTE — HISTORY OF PRESENT ILLNESS
[Result of repetitive motion] : result of repetitive motion [8] : 8 [4] : 4 [Dull/Aching] : dull/aching [Sharp] : sharp [Constant] : constant [Household chores] : household chores [Leisure] : leisure [Rest] : rest [Walking] : walking [de-identified] : 1/31/23 Pt is here for RT knee pain since Dec 2022. Right knee meniscus repair done by Dr. Hart 6-7 yrs ago. NKI. \par \par 4/11/23 Pt is here for first post op s/p right TKA done on 3/24/2023 by Dr. Read. Pt states she has swelling in the right leg which is improving. \par \par 5/11/23: Here for post op visit from sx on 3/24/23 - RT TKA. Currently doing PT. Notes improvement, but is concerned about progression. Intermittent numbness into shin. \par \par 5/26/23: here for post op visit from sx on 3/24/23 - RT TKA. Currently doing PT. Concerned that her progression is not where it needs to be at this point in recovery. Swelling at night. In severe pain.  [] : no [FreeTextEntry1] : RT knee [FreeTextEntry7] : up and down the leg after activity [de-identified] : 1/25/23 [de-identified] : Dr. Hart [de-identified] : MRI, X-ray [de-identified] : Sx [de-identified] : 3/24/23 [de-identified] : Right TKA

## 2023-05-29 ENCOUNTER — FORM ENCOUNTER (OUTPATIENT)
Age: 53
End: 2023-05-29

## 2023-06-06 ENCOUNTER — APPOINTMENT (OUTPATIENT)
Dept: CARDIOLOGY | Facility: CLINIC | Age: 53
End: 2023-06-06
Payer: COMMERCIAL

## 2023-06-06 VITALS
SYSTOLIC BLOOD PRESSURE: 108 MMHG | BODY MASS INDEX: 21.34 KG/M2 | HEART RATE: 101 BPM | HEIGHT: 64 IN | WEIGHT: 125 LBS | DIASTOLIC BLOOD PRESSURE: 76 MMHG | OXYGEN SATURATION: 100 %

## 2023-06-06 PROCEDURE — 99215 OFFICE O/P EST HI 40 MIN: CPT | Mod: 25

## 2023-06-06 PROCEDURE — 99402 PREV MED CNSL INDIV APPRX 30: CPT

## 2023-06-15 ENCOUNTER — APPOINTMENT (OUTPATIENT)
Dept: ORTHOPEDIC SURGERY | Facility: CLINIC | Age: 53
End: 2023-06-15
Payer: COMMERCIAL

## 2023-06-15 ENCOUNTER — APPOINTMENT (OUTPATIENT)
Dept: NEPHROLOGY | Facility: CLINIC | Age: 53
End: 2023-06-15
Payer: COMMERCIAL

## 2023-06-15 VITALS
DIASTOLIC BLOOD PRESSURE: 71 MMHG | SYSTOLIC BLOOD PRESSURE: 114 MMHG | BODY MASS INDEX: 21.08 KG/M2 | HEIGHT: 64 IN | HEART RATE: 85 BPM | OXYGEN SATURATION: 100 % | TEMPERATURE: 97.4 F | WEIGHT: 123.46 LBS

## 2023-06-15 VITALS — HEIGHT: 64 IN | WEIGHT: 123 LBS | BODY MASS INDEX: 21 KG/M2

## 2023-06-15 PROCEDURE — 99214 OFFICE O/P EST MOD 30 MIN: CPT

## 2023-06-15 PROCEDURE — 99024 POSTOP FOLLOW-UP VISIT: CPT

## 2023-06-15 RX ORDER — ONDANSETRON 4 MG/1
4 TABLET ORAL 4 TIMES DAILY
Qty: 30 | Refills: 0 | Status: DISCONTINUED | COMMUNITY
Start: 2022-07-15 | End: 2023-06-15

## 2023-06-15 RX ORDER — OXYCODONE 5 MG/1
5 TABLET ORAL
Qty: 60 | Refills: 0 | Status: DISCONTINUED | COMMUNITY
Start: 2023-04-11 | End: 2023-06-15

## 2023-06-15 RX ORDER — TRAMADOL HYDROCHLORIDE 50 MG/1
50 TABLET, COATED ORAL EVERY 8 HOURS
Qty: 40 | Refills: 0 | Status: DISCONTINUED | COMMUNITY
Start: 2023-05-30 | End: 2023-06-15

## 2023-06-15 RX ORDER — OXYCODONE 10 MG/1
10 TABLET ORAL
Qty: 60 | Refills: 0 | Status: DISCONTINUED | COMMUNITY
Start: 2023-03-31 | End: 2023-06-15

## 2023-06-15 RX ORDER — DRONEDARONE 400 MG/1
400 TABLET, FILM COATED ORAL TWICE DAILY
Qty: 60 | Refills: 1 | Status: DISCONTINUED | COMMUNITY
Start: 2023-02-21 | End: 2023-06-15

## 2023-06-15 NOTE — PHYSICAL EXAM
[General Appearance - Alert] : alert [Sclera] : the sclera and conjunctiva were normal [Hearing Threshold Finger Rub Not [place]] : hearing was normal [Jugular Venous Distention Increased] : there was no jugular-venous distention [Neck Cervical Mass (___cm)] : no neck mass was observed [Exaggerated Use Of Accessory Muscles For Inspiration] : no accessory muscle use [Auscultation Breath Sounds / Voice Sounds] : lungs were clear to auscultation bilaterally [Heart Sounds] : normal S1 and S2 [FreeTextEntry1] : right knee swollen with effusion not warm but + tTp [No CVA Tenderness] : no ~M costovertebral angle tenderness [Oriented To Time, Place, And Person] : oriented to person, place, and time [Abnormal Walk] : normal gait [Impaired Insight] : insight and judgment were intact

## 2023-06-15 NOTE — IMAGING
[Right] : right knee [AP] : anteroposterior [Lateral] : lateral [[place]] : skyline [Components well fixed, in good position] : Components well fixed, in good position

## 2023-06-15 NOTE — PHYSICAL EXAM
[Right] : right knee [] : patient ambulates without assistive device [TWNoteComboBox7] : flexion 110 degrees [de-identified] : extension 3 degrees

## 2023-06-15 NOTE — REVIEW OF SYSTEMS
[Feeling Poorly] : not feeling poorly [Feeling Tired] : not feeling tired [Eyesight Problems] : no eyesight problems [Nosebleeds] : no nosebleeds [Chest Pain] : no chest pain [Palpitations] : no palpitations [Shortness Of Breath] : no shortness of breath [Cough] : no cough [Abdominal Pain] : no abdominal pain [Vomiting] : no vomiting [Dysuria] : no dysuria [Incontinence] : no incontinence [Joint Swelling] : joint swelling [Dizziness] : no dizziness [Fainting] : no fainting [Anxiety] : no anxiety [Depression] : no depression [FreeTextEntry9] :  knee pain

## 2023-06-15 NOTE — HISTORY OF PRESENT ILLNESS
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  She has a history of tracheo malacia, esophageal dysmotility, SVT.  She has been having some MSK issues including cervical spinal degenerative disease and she requires knee surgery as well.  Since december she has been taking ibuprofen 800 mg PO TID for her pain.  In that setting she had blood work done in February showing two episodes of elevated serum potassium.  Meanwhile she has also had increasing palpitations and GI upset.  She has two primary concerns.  First that an elevated potassium will interfere with her upcoming knee surgery and second that her prescribed antiarrythmics will contribute to her hyperkalemia.  Upon evaluation today she denies chest pain sob, has been doing well to lose weight.  She has been off NSAIDs since mid february, she is watching the potassium in her diet.  She has no history of kidney stones no urinary complaints no family history of kidney issues.  She reports that she is not presently taking flecainide, propranolol, or multaq.\par \par 6/15/23 -- Today I met Sravanthi in person for the first time.  She reports that since her knee surgery she has had very bad pain and swelling.  Work up revealed that she has a baker's cyst in the right leg.  By 1 pm in the afternoon each day the pain becomes unbearable.  She has not been taking really anything for pain other than some diclofenac gel tylenol.  She was prescribed opioids but has not taken them due to nausea.  no cp no sb no nvd.

## 2023-06-15 NOTE — HISTORY OF PRESENT ILLNESS
[Right Leg] : right leg [Gradual] : gradual [7] : 7 [6] : 6 [Localized] : localized [Tightness] : tightness [Intermittent] : intermittent [Household chores] : household chores [Rest] : rest [Standing] : standing [Walking] : walking [de-identified] : 06/15/2023 HERE FOR A FOLLOW UP S/P  RIGHT TKA HAVING A BURNING SENSATION AND SWELLING       ,PT HAS AN APPOINTMENT WITH VASCULAR SURGEON NEXCT WEEK ,KNEE IS HOT TO THE TOUCH  [FreeTextEntry1] : KNEE  [de-identified] : SX

## 2023-06-15 NOTE — ASSESSMENT
[FreeTextEntry1] : 53 years old woman with SVT, MSK pain, here for evaluation of hyperkalemia\par \par Elevated Serum Potassium -- I have reviewed the patient's chart and blood work going back to 2018.  She did not have any episodes of hyperkalemia until February 2023 at which time her K was 5.7 on outpatient labs and upon repeat was 5.3 (on plasma).  The patient's rise in potassium coincides with her starting to take ibuprofen 800 TID since December 2022.  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  Repeat potassium off of NSAIDS was WNL.  Now however she has intractable pain and is asking if it would be ok for her to restart.  Her creatinine has been within normal limits.  We will do blood work today.  She can take advil on an as needed basis but asked her to try and limit to 400 BID.  If she goes back on NSAIDs will plan to repeat blood work again in two weeks.  She has follow up with medicine, caridology, ortho, vascular.\par \par The time spent is inclusive of the time it took to see, evaluate, and manage the patient.  Time is inclusive of the time taken to review chart, reconcile medications, document findings, and communicate with other providers (when applicable).\par

## 2023-06-15 NOTE — ASSESSMENT
[FreeTextEntry1] : S/P RT TKA 3/24/23: NO F/C/S. HAS INTERMITTENT SWELLING. C/O INCREASED PAIN. SHE WAS REASSURED. XRAYS REVIEWED WITH COMPONENTS WELL FIXED. NO SIGNS OF INFECTION. GOOD LIGAMENT BALANCE ON EXAM.  DISCUSSED THE IMPORTANCE OF ELEVATION TO REDUCE SWELLING. PROPER ELEVATION TECHNIQUES DISCUSSED. ABX AND PRECAUTIONS AGAIN REVIEWED. QUESTIONS ANSWERED.

## 2023-06-16 ENCOUNTER — NON-APPOINTMENT (OUTPATIENT)
Age: 53
End: 2023-06-16

## 2023-06-16 LAB
ALBUMIN SERPL ELPH-MCNC: 4.8 G/DL
ANION GAP SERPL CALC-SCNC: 21 MMOL/L
BUN SERPL-MCNC: 15 MG/DL
CALCIUM SERPL-MCNC: 10.2 MG/DL
CHLORIDE SERPL-SCNC: 103 MMOL/L
CO2 SERPL-SCNC: 21 MMOL/L
CREAT SERPL-MCNC: 0.67 MG/DL
EGFR: 103 ML/MIN/1.73M2
GLUCOSE SERPL-MCNC: 73 MG/DL
PHOSPHATE SERPL-MCNC: 4.4 MG/DL
POTASSIUM SERPL-SCNC: 5.4 MMOL/L
SODIUM SERPL-SCNC: 145 MMOL/L

## 2023-06-19 ENCOUNTER — TRANSCRIPTION ENCOUNTER (OUTPATIENT)
Age: 53
End: 2023-06-19

## 2023-06-23 LAB
ALBUMIN SERPL ELPH-MCNC: 4.4 G/DL
ANION GAP SERPL CALC-SCNC: 11 MMOL/L
APPEARANCE: CLEAR
BILIRUBIN URINE: NEGATIVE
BLOOD URINE: NEGATIVE
BUN SERPL-MCNC: 19 MG/DL
CALCIUM SERPL-MCNC: 9.8 MG/DL
CHLORIDE SERPL-SCNC: 107 MMOL/L
CO2 SERPL-SCNC: 26 MMOL/L
COLOR: YELLOW
CREAT SERPL-MCNC: 0.59 MG/DL
EGFR: 106 ML/MIN/1.73M2
GLUCOSE QUALITATIVE U: NEGATIVE MG/DL
GLUCOSE SERPL-MCNC: 79 MG/DL
KETONES URINE: NEGATIVE MG/DL
LEUKOCYTE ESTERASE URINE: NEGATIVE
NITRITE URINE: NEGATIVE
OSMOLALITY UR: 449 MOSM/KG
PH URINE: 6.5
PHOSPHATE SERPL-MCNC: 3.8 MG/DL
POTASSIUM SERPL-SCNC: 5 MMOL/L
PROTEIN URINE: NEGATIVE MG/DL
SODIUM SERPL-SCNC: 143 MMOL/L
SPECIFIC GRAVITY URINE: 1.01
UROBILINOGEN URINE: 0.2 MG/DL

## 2023-06-26 LAB
CHLORIDE ?TM UR-SCNC: 62 MMOL/L
CREAT SPEC-SCNC: 51 MG/DL
OSMOLALITY SERPL: 295 MOSMOL/KG
POTASSIUM UR-SCNC: 20.7 MMOL/L
SODIUM ?TM SUB UR QN: 76 MMOL/L

## 2023-07-03 ENCOUNTER — NON-APPOINTMENT (OUTPATIENT)
Age: 53
End: 2023-07-03

## 2023-07-03 ENCOUNTER — TRANSCRIPTION ENCOUNTER (OUTPATIENT)
Age: 53
End: 2023-07-03

## 2023-07-06 ENCOUNTER — TRANSCRIPTION ENCOUNTER (OUTPATIENT)
Age: 53
End: 2023-07-06

## 2023-07-06 ENCOUNTER — NON-APPOINTMENT (OUTPATIENT)
Age: 53
End: 2023-07-06

## 2023-07-07 ENCOUNTER — TRANSCRIPTION ENCOUNTER (OUTPATIENT)
Age: 53
End: 2023-07-07

## 2023-07-10 ENCOUNTER — APPOINTMENT (OUTPATIENT)
Dept: VASCULAR SURGERY | Facility: CLINIC | Age: 53
End: 2023-07-10
Payer: COMMERCIAL

## 2023-07-10 VITALS
HEART RATE: 103 BPM | DIASTOLIC BLOOD PRESSURE: 80 MMHG | BODY MASS INDEX: 20.83 KG/M2 | SYSTOLIC BLOOD PRESSURE: 118 MMHG | WEIGHT: 122 LBS | TEMPERATURE: 97.7 F | HEIGHT: 64 IN

## 2023-07-10 PROCEDURE — 99204 OFFICE O/P NEW MOD 45 MIN: CPT

## 2023-07-10 PROCEDURE — 93970 EXTREMITY STUDY: CPT

## 2023-07-11 NOTE — PHYSICAL EXAM
[1+] : left 1+ [2+] : left 2+ [Ankle Swelling (On Exam)] : present [Ankle Swelling Bilaterally] : bilaterally  [Varicose Veins Of Lower Extremities] : bilaterally [] : bilaterally [Ankle Swelling On The Right] : mild [No Rash or Lesion] : No rash or lesion [Alert] : alert [Oriented to Person] : oriented to person [Oriented to Place] : oriented to place [Oriented to Time] : oriented to time [Calm] : calm [JVD] : no jugular venous distention  [Right Carotid Bruit] : no bruit heard over the right carotid [Left Carotid Bruit] : no bruit heard over the left carotid [de-identified] : nad [de-identified] : wnl [de-identified] : cta evelyn [FreeTextEntry1] : Mild left and  moderate on the rle  leg venous insufficiency \par w mild  bilateral leg stasis dermatitis, hyperpigmentation in the gator area\par and mild lle and  moderate right leg edema \par Multiple  bilateral leg small varicose  veins and spider veins  ant post media calf and shin \par RLE Varicose veins measuring  2-4 mm in size on the calf /shin \par LLE Varicose veins measuring  1-2 mm in size on the calf /shin \par no wounds/ulcers\par  [de-identified] : wnl [de-identified] : Velasquez Cranial nerves 2-12 velasquez grossly intact [de-identified] : cooperative

## 2023-07-11 NOTE — DATA REVIEWED
[FreeTextEntry1] : Outside facility report reviewed  4/12/2023 RLE Venous Doppler  no dvt sig for bakers cyst  4.4cm max diam \par \par Outside facility report reviewed 6/13/2023  RLE Venous Doppler  no dvt sig for bakers cyst  4.9cm max diam \par \par Venous Doppler Velasquez LE  no acute dvt svt \par                            RLE no sono evidence of reflux\par                                  bakers cyst not viz\par                                   sig in prox medial calf area fluid collection 3.7 cm \par                            LLE  no sono evidence of reflux \par                                     \par \par

## 2023-07-11 NOTE — ASSESSMENT
[Arterial/Venous Disease] : arterial/venous disease [Other: _____] : [unfilled] [FreeTextEntry1] : Impression  symptomatic venous insuff not yet sono evident w hx of rle Bakers cyst w s/o bakers Cyst rupture \par \par \par Plan Med Conservative management leg elevation, knee high compression stockings 15-20mm Hg (rx given)\par pt/ot for rt tkr as per ortho \par telehealth visit in 4-5 weeks to re eval\par ov w rle venous duplex 2 mo sept 2023 to re eval  presence of rle bakers cyst\par \par \par \par \par Varicose veins are enlarged, twisted veins. Varicose veins are caused by increased blood pressure in the veins.  The blood moves towards the heart by 1-way valves in the veins. When the valves become weakened or damaged, blood can collect in the veins and pool in your lower legs (ankles). This causes the veins to become enlarged and incompetent with reflux. Sitting or standing for long periods can cause blood to pool in the leg veins, increasing the pressure within the veins. \par Risk factors for varicose veins or venous disease may include:  obesity, older age, standing or sitting for prolonged periods of time for several years, being female, pregnancy, taking oral contraceptive pills or hormone replacement, being inactive, and/or smoking. \par The most common symptoms of varicose veins are sensations in the legs, such as a heavy feeling, burning, and/or aching. However, each individual may experience symptoms differently.  Other symptoms may include:  color changes in the skin, sores on the legs, or rash.  Severe varicose veins or venous disease may eventually produce long-term mild swelling that can result in more serious skin and tissue problems, such as ulcers and non-healing sores.\par Varicose veins and venous disease are diagnosed by a complete medical history, physical examination, and diagnostic studies for varicose veins including duplex ultrasound and color-flow imaging.  \par Medical treatment for varicose veins and venous disease include:  compression stockings, sclerotherapy, endovenous ablation and/or surgical treatment with microphlebectomy.  \par \par

## 2023-07-11 NOTE — HISTORY OF PRESENT ILLNESS
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

## 2023-07-17 DIAGNOSIS — E87.5 HYPERKALEMIA: ICD-10-CM

## 2023-07-20 ENCOUNTER — NON-APPOINTMENT (OUTPATIENT)
Age: 53
End: 2023-07-20

## 2023-07-24 LAB — POTASSIUM SERPL-SCNC: 5.2 MMOL/L

## 2023-07-27 ENCOUNTER — APPOINTMENT (OUTPATIENT)
Dept: ORTHOPEDIC SURGERY | Facility: CLINIC | Age: 53
End: 2023-07-27
Payer: COMMERCIAL

## 2023-07-27 VITALS — WEIGHT: 123 LBS | BODY MASS INDEX: 21 KG/M2 | HEIGHT: 64 IN

## 2023-07-27 PROCEDURE — 99213 OFFICE O/P EST LOW 20 MIN: CPT

## 2023-07-27 NOTE — ASSESSMENT
[FreeTextEntry1] : S/P RT TKA 3/24/23: NO F/C/S. HAS INTERMITTENT SWELLING. C/O CONSISTENT PAIN, NOW WITH POSTERIOR KNEE PAIN THAT RADIATES DOWN TO THE CALF. XRAYS REVIEWED WITH COMPONENTS WELL FIXED. NO SIGNS OF INFECTION. GOOD LIGAMENT BALANCE ON EXAM. ABX AND PRECAUTIONS AGAIN REVIEWED. QUESTIONS ANSWERED. SHE WILL RTW 8/15/23. \par \par WILL ORDER RT KNEE MRI WITH [facility] TO EVAL FOR MUSCLE STRAIN

## 2023-07-27 NOTE — IMAGING
[Right] : right knee [AP] : anteroposterior [Lateral] : lateral [[place]] : skyline [Components well fixed, in good position] : Components well fixed, in good position

## 2023-07-27 NOTE — HISTORY OF PRESENT ILLNESS
[8] : 8 [6] : 6 [Dull/Aching] : dull/aching [Leisure] : leisure [Not working due to injury] : Work status: not working due to injury [Nothing helps with pain getting better] : Nothing helps with pain getting better [Standing] : standing [Walking] : walking [Stairs] : stairs [] : Post Surgical Visit: no [FreeTextEntry1] : right knee [FreeTextEntry5] : Here today for follow up right knee. Pain and swelling worse. Takes tylenol when needed with no relief. [FreeTextEntry6] : swelling [de-identified] : sx, tylenol

## 2023-07-27 NOTE — PHYSICAL EXAM
[Right] : right knee [] : patient ambulates without assistive device [TWNoteComboBox7] : flexion 110 degrees [de-identified] : extension 3 degrees

## 2023-07-31 ENCOUNTER — APPOINTMENT (OUTPATIENT)
Dept: GASTROENTEROLOGY | Facility: CLINIC | Age: 53
End: 2023-07-31
Payer: COMMERCIAL

## 2023-07-31 VITALS
TEMPERATURE: 97.8 F | DIASTOLIC BLOOD PRESSURE: 74 MMHG | SYSTOLIC BLOOD PRESSURE: 114 MMHG | HEART RATE: 77 BPM | OXYGEN SATURATION: 98 %

## 2023-07-31 DIAGNOSIS — D12.6 BENIGN NEOPLASM OF COLON, UNSPECIFIED: ICD-10-CM

## 2023-07-31 DIAGNOSIS — K59.09 OTHER CONSTIPATION: ICD-10-CM

## 2023-07-31 PROCEDURE — 99214 OFFICE O/P EST MOD 30 MIN: CPT

## 2023-08-02 ENCOUNTER — APPOINTMENT (OUTPATIENT)
Dept: MRI IMAGING | Facility: CLINIC | Age: 53
End: 2023-08-02
Payer: COMMERCIAL

## 2023-08-02 PROCEDURE — 73721 MRI JNT OF LWR EXTRE W/O DYE: CPT | Mod: RT

## 2023-08-04 ENCOUNTER — APPOINTMENT (OUTPATIENT)
Dept: CARDIOLOGY | Facility: CLINIC | Age: 53
End: 2023-08-04
Payer: COMMERCIAL

## 2023-08-04 PROCEDURE — 99215 OFFICE O/P EST HI 40 MIN: CPT | Mod: 95

## 2023-08-07 NOTE — ASSESSMENT
Hpi Title: Evaluation of Skin Lesions
[FreeTextEntry1] : 53-year-old female with history of TBM/chronic constipation here for follow-up care.  Patient has been started and maintained on Ozempic since October 2022, with significant abdominal pain/bloating/worsening constipation despite Linzess use.  Likely Ozempic is huge contributing factor to slow GI motility.  1.  Plan for colonoscopy given sessile serrated adenoma and tubular adenoma found in a fair prep colonoscopy 3 years prior 2.  Change Linzess to Motegrity 3.  Patient will require 2-day prep The risks, benefits and alternatives of the procedure were discussed with the patient in detail. Risks include bleeding, infection, bowel perforation, adverse reaction to sedation and missed lesions. All questions were answered. The patient expressed understanding of these risks and is agreeable to proceed.   
How Severe Are Your Spot(S)?: mild
Have Your Spot(S) Been Treated In The Past?: has not been treated

## 2023-08-07 NOTE — HISTORY OF PRESENT ILLNESS
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 Gonzalez here for follow-up care.  From an esophageal standpoint, she is doing well.  She was started on Ozempic in October 2022 and has been titrated up to maintenance dosing.  She has lost approximately 70 pounds with the Ozempic. She suffers from constipation at baseline, constipation has significantly worsened despite taking Linzess.  She also reports significant abdominal bloating and distention.  Last colonoscopy performed August 2020, prep was fair, tubular adenoma found in the cecum Descending colon demonstrated sessile serrated polyp/adenoma

## 2023-08-07 NOTE — PHYSICAL EXAM
[Alert] : alert [Normal Voice/Communication] : normal voice/communication [Healthy Appearing] : healthy appearing [No Acute Distress] : no acute distress [Sclera] : the sclera and conjunctiva were normal [Hearing Threshold Finger Rub Not [place]] : hearing was normal [Normal Lips/Gums] : the lips and gums were normal [Oropharynx] : the oropharynx was normal [Normal Appearance] : the appearance of the neck was normal [No Neck Mass] : no neck mass was observed [No Respiratory Distress] : no respiratory distress [No Acc Muscle Use] : no accessory muscle use [Respiration, Rhythm And Depth] : normal respiratory rhythm and effort [Auscultation Breath Sounds / Voice Sounds] : lungs were clear to auscultation bilaterally [Heart Rate And Rhythm] : heart rate was normal and rhythm regular [Normal S1, S2] : normal S1 and S2 [Murmurs] : no murmurs [Bowel Sounds] : normal bowel sounds [Abdomen Tenderness] : non-tender [No Masses] : no abdominal mass palpated [Abdomen Soft] : soft [] : no hepatosplenomegaly [Oriented To Time, Place, And Person] : oriented to person, place, and time

## 2023-08-15 ENCOUNTER — APPOINTMENT (OUTPATIENT)
Dept: ORTHOPEDIC SURGERY | Facility: CLINIC | Age: 53
End: 2023-08-15
Payer: COMMERCIAL

## 2023-08-15 VITALS — HEIGHT: 64 IN | BODY MASS INDEX: 21 KG/M2 | WEIGHT: 123 LBS

## 2023-08-15 PROCEDURE — 99213 OFFICE O/P EST LOW 20 MIN: CPT

## 2023-08-15 RX ORDER — SEMAGLUTIDE 2.68 MG/ML
8 INJECTION, SOLUTION SUBCUTANEOUS
Qty: 3 | Refills: 2 | Status: DISCONTINUED | COMMUNITY
Start: 2022-10-12 | End: 2023-08-15

## 2023-08-15 NOTE — HISTORY OF PRESENT ILLNESS
[8] : 8 [6] : 6 [Dull/Aching] : dull/aching [Leisure] : leisure [Nothing helps with pain getting better] : Nothing helps with pain getting better [Standing] : standing [Walking] : walking [Stairs] : stairs [Not working due to injury] : Work status: not working due to injury [de-identified] : 08/15/23 here to review mri results on the right knee, no changes since last visit  [] : Post Surgical Visit: no [FreeTextEntry1] : right knee [FreeTextEntry5] : Here today for follow up right knee. Pain and swelling worse. Takes tylenol when needed with no relief. [FreeTextEntry6] : swelling [de-identified] : mri done at [facility]  [de-identified] : sx, tylenol

## 2023-08-15 NOTE — PHYSICAL EXAM
[Right] : right knee [] : no sign of infection [TWNoteComboBox7] : flexion 110 degrees [de-identified] : extension 3 degrees

## 2023-08-15 NOTE — IMAGING
[Right] : right knee [Lateral] : lateral [AP] : anteroposterior [[place]] : skyline [Components well fixed, in good position] : Components well fixed, in good position

## 2023-08-15 NOTE — ASSESSMENT
[FreeTextEntry1] : S/P RT TKA 3/24/23: NO F/C/S. HAS INTERMITTENT SWELLING. C/O CONSISTENT PAIN, NOW WITH POSTERIOR KNEE PAIN THAT RADIATES DOWN TO THE CALF. XRAYS REVIEWED WITH COMPONENTS WELL FIXED. NO SIGNS OF INFECTION. GOOD LIGAMENT BALANCE ON EXAM. ABX AND PRECAUTIONS AGAIN REVIEWED. QUESTIONS ANSWERED. WILL FOLLOW UP IN 4 MONTHS WITH REPEAT XRAYS   RT KNEE MRI 8/2/23 REVIEWED WNL S/P RT TKA.

## 2023-08-15 NOTE — DATA REVIEWED
[FreeTextEntry1] : RIGHT KNEE MRI [facility] 8/2/23: 1. Interval right total knee arthroplasty which appears anatomically aligned without gross osteolysis or surrounding soft tissue fluid collection. 2. Mild effusion, capsulitis, extensor mechanism tendinopathy, peripatellar scar, infrapatellar fat pad scarring, and incisional scar anteriorly. 3. No large loose body or particle disease.  4. No MRI evidence of acute muscle tear.  5. Clinical correlation and follow up is recommended.

## 2023-08-16 ENCOUNTER — APPOINTMENT (OUTPATIENT)
Dept: VASCULAR SURGERY | Facility: CLINIC | Age: 53
End: 2023-08-16
Payer: COMMERCIAL

## 2023-08-16 PROCEDURE — 99442: CPT

## 2023-08-16 NOTE — ASSESSMENT
[FreeTextEntry1] : Impression  symptomatic venous insuff not yet sono evident w hx of  Bakers cyst w s/o bakers Cyst rupture and ongoing sx     Plan Med Conservative management leg elevation, knee high compression stockings 15-20mm Hg pt/ot for rt tkr as per ortho  pt will f/u close to home at a [facility] facility  for evelyn le venous duplex s/o dvt then telehealth in 1-2 weeks  ov w rle venous duplex 2 mo sept 2023 to re eval  presence of rle bakers cyst Telephonic visit  time duration 11 min      Varicose veins are enlarged, twisted veins. Varicose veins are caused by increased blood pressure in the veins.  The blood moves towards the heart by 1-way valves in the veins. When the valves become weakened or damaged, blood can collect in the veins and pool in your lower legs (ankles). This causes the veins to become enlarged and incompetent with reflux. Sitting or standing for long periods can cause blood to pool in the leg veins, increasing the pressure within the veins.  Risk factors for varicose veins or venous disease may include:  obesity, older age, standing or sitting for prolonged periods of time for several years, being female, pregnancy, taking oral contraceptive pills or hormone replacement, being inactive, and/or smoking.  The most common symptoms of varicose veins are sensations in the legs, such as a heavy feeling, burning, and/or aching. However, each individual may experience symptoms differently.  Other symptoms may include:  color changes in the skin, sores on the legs, or rash.  Severe varicose veins or venous disease may eventually produce long-term mild swelling that can result in more serious skin and tissue problems, such as ulcers and non-healing sores. Varicose veins and venous disease are diagnosed by a complete medical history, physical examination, and diagnostic studies for varicose veins including duplex ultrasound and color-flow imaging.   Medical treatment for varicose veins and venous disease include:  compression stockings, sclerotherapy, endovenous ablation and/or surgical treatment with microphlebectomy.     [Arterial/Venous Disease] : arterial/venous disease [Other: _____] : [unfilled]

## 2023-08-16 NOTE — DATA REVIEWED
[FreeTextEntry1] : Outside facility report reviewed  4/12/2023 RLE Venous Doppler  no dvt sig for bakers cyst  4.4cm max diam   Outside facility report reviewed 6/13/2023  RLE Venous Doppler  no dvt sig for bakers cyst  4.9cm max diam   7/10/2023  Venous Doppler Velasquez LE  no acute dvt svt                             RLE no sono evidence of reflux                                  bakers cyst not viz                                   sig in prox medial calf area fluid collection 3.7 cm                             LLE  no sono evidence of reflux

## 2023-08-16 NOTE — PHYSICAL EXAM
[No Rash or Lesion] : No rash or lesion [Alert] : alert [Oriented to Person] : oriented to person [Oriented to Place] : oriented to place [Oriented to Time] : oriented to time [Calm] : calm [de-identified] : no resp distress [FreeTextEntry1] : Physical exam findings via telephonic review with patient   [de-identified] : cooperative

## 2023-08-16 NOTE — REASON FOR VISIT
[Home] : at home, [unfilled] , at the time of the visit. [Medical Office: ([facility])___] : at the medical office located in  [Other:____] : [unfilled] [FreeTextEntry1] : my legs still bother me

## 2023-08-16 NOTE — HISTORY OF PRESENT ILLNESS
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 \par  \par  pt states rle c/o worse since march 2023 \par  Pt c/o right lleg pain swelling heaviness and discomfort worse w activity and by the end of the day\par  Onset worse since march 2023 \par  Intensity severe\par  Pt denies recent injury, travel or thrombophilic event\par  pt also c/o post op from TKR of right medial prox calf  pain \par  \par  Pt  denies  art insuff sx \par  \par  Pt denies any recent  cardiac c/o , SOB, Chest Pain or recent heart attacks \par  \par  \par  \par   [de-identified] : pt is complaint w comp stockings pt states some improvment in le c/o w use but overall  evelyn le remain significantly symptomatx pt states that it will be difficult  for her to return to work  at this time and is concerned about dvt pt has f/u w otho who  re eval her and advised her to continue pt/ot

## 2023-08-17 ENCOUNTER — TRANSCRIPTION ENCOUNTER (OUTPATIENT)
Age: 53
End: 2023-08-17

## 2023-08-17 ENCOUNTER — APPOINTMENT (OUTPATIENT)
Dept: ULTRASOUND IMAGING | Facility: CLINIC | Age: 53
End: 2023-08-17
Payer: COMMERCIAL

## 2023-08-17 DIAGNOSIS — R11.0 NAUSEA: ICD-10-CM

## 2023-08-17 PROCEDURE — 93970 EXTREMITY STUDY: CPT

## 2023-08-18 ENCOUNTER — APPOINTMENT (OUTPATIENT)
Dept: GASTROENTEROLOGY | Facility: HOSPITAL | Age: 53
End: 2023-08-18

## 2023-08-18 ENCOUNTER — TRANSCRIPTION ENCOUNTER (OUTPATIENT)
Age: 53
End: 2023-08-18

## 2023-08-18 ENCOUNTER — OUTPATIENT (OUTPATIENT)
Dept: OUTPATIENT SERVICES | Facility: HOSPITAL | Age: 53
LOS: 1 days | Discharge: HOME | End: 2023-08-18
Payer: COMMERCIAL

## 2023-08-18 VITALS
HEART RATE: 74 BPM | OXYGEN SATURATION: 99 % | SYSTOLIC BLOOD PRESSURE: 103 MMHG | RESPIRATION RATE: 14 BRPM | DIASTOLIC BLOOD PRESSURE: 54 MMHG

## 2023-08-18 VITALS
HEIGHT: 64 IN | OXYGEN SATURATION: 99 % | WEIGHT: 123.02 LBS | RESPIRATION RATE: 13 BRPM | SYSTOLIC BLOOD PRESSURE: 107 MMHG | TEMPERATURE: 98 F | DIASTOLIC BLOOD PRESSURE: 77 MMHG | HEART RATE: 75 BPM

## 2023-08-18 DIAGNOSIS — Z98.890 OTHER SPECIFIED POSTPROCEDURAL STATES: Chronic | ICD-10-CM

## 2023-08-18 DIAGNOSIS — Z98.51 TUBAL LIGATION STATUS: Chronic | ICD-10-CM

## 2023-08-18 DIAGNOSIS — Z90.49 ACQUIRED ABSENCE OF OTHER SPECIFIED PARTS OF DIGESTIVE TRACT: Chronic | ICD-10-CM

## 2023-08-18 DIAGNOSIS — D12.6 BENIGN NEOPLASM OF COLON, UNSPECIFIED: ICD-10-CM

## 2023-08-18 DIAGNOSIS — Z98.1 ARTHRODESIS STATUS: Chronic | ICD-10-CM

## 2023-08-18 DIAGNOSIS — Z98.49 CATARACT EXTRACTION STATUS, UNSPECIFIED EYE: Chronic | ICD-10-CM

## 2023-08-18 DIAGNOSIS — Z86.018 PERSONAL HISTORY OF OTHER BENIGN NEOPLASM: Chronic | ICD-10-CM

## 2023-08-18 PROCEDURE — 45378 DIAGNOSTIC COLONOSCOPY: CPT | Mod: 53

## 2023-08-18 RX ORDER — FAMOTIDINE 10 MG/ML
1 INJECTION INTRAVENOUS
Qty: 0 | Refills: 0 | DISCHARGE

## 2023-08-18 RX ORDER — ALIROCUMAB 75 MG/ML
75 INJECTION, SOLUTION SUBCUTANEOUS
Qty: 1 | Refills: 6 | Status: DISCONTINUED | COMMUNITY
Start: 2022-07-13 | End: 2023-08-18

## 2023-08-18 RX ORDER — EVOLOCUMAB 140 MG/ML
140 INJECTION, SOLUTION SUBCUTANEOUS
Qty: 6 | Refills: 5 | Status: DISCONTINUED | COMMUNITY
Start: 2023-08-18 | End: 2023-08-18

## 2023-08-18 RX ORDER — SODIUM CHLORIDE 9 MG/ML
500 INJECTION, SOLUTION INTRAVENOUS
Refills: 0 | Status: DISCONTINUED | OUTPATIENT
Start: 2023-08-18 | End: 2023-09-01

## 2023-08-18 NOTE — ASU PATIENT PROFILE, ADULT - NSICDXPASTMEDICALHX_GEN_ALL_CORE_FT
PAST MEDICAL HISTORY:  Allergic rhinitis, seasonal     Anxiety     Esophageal dysmotility     GERD (gastroesophageal reflux disease)     History of hyperkalemia     HLD (hyperlipidemia)     Hypothyroidism     IBS (irritable bowel syndrome)     Low serum vitamin D     Osteoarthritis of right knee     SVT (supraventricular tachycardia)

## 2023-08-18 NOTE — PRE PROCEDURE NOTE - PRE PROCEDURE EVALUATION
Pre-Endoscopy Evaluation    Referring Physician:                                    Indication for Procedure:  screening    Sedation by Anesthesia [X ]    PAST MEDICAL & SURGICAL HISTORY:  Hypothyroidism      IBS (irritable bowel syndrome)      Anxiety      Low serum vitamin D      Allergic rhinitis, seasonal      GERD (gastroesophageal reflux disease)      Esophageal dysmotility      HLD (hyperlipidemia)      Osteoarthritis of right knee      SVT (supraventricular tachycardia)      History of hyperkalemia      History of abdominoplasty  2012, mini      History of parotidectomy  left      History of carpal tunnel release  left      H/O arthroscopy of right knee      Status post arthroscopy of hip  left      History of cataract surgery  bilateral      H/O bilateral breast reduction surgery      History of lipoma  removed from left shoulder      H/O tubal ligation  bilateral      History of bladder surgery  sling      Status post right breast lumpectomy  1996      S/P tracheoplasty  2018 can use size 7 ET tube "I have been told I am totally fine now"      H/O parotidectomy      H/O shoulder surgery  left      S/P cervical spinal fusion          Latex allergy: [ ] yes [X] no    Smoking: [ ] yes  [X] no    AICD/PPM: [ ] yes   [X] no    Pertinent lab data:                      Physical Examination:  Daily Height in cm: 162.56 (18 Aug 2023 07:23)    Daily   Vital Signs Last 24 Hrs  T(C): 36.5 (18 Aug 2023 07:23), Max: 36.5 (18 Aug 2023 07:23)  T(F): 97.7 (18 Aug 2023 07:23), Max: 97.7 (18 Aug 2023 07:23)  HR: 75 (18 Aug 2023 07:23) (75 - 75)  BP: 107/77 (18 Aug 2023 07:23) (107/77 - 107/77)  BP(mean): --  RR: 13 (18 Aug 2023 07:23) (13 - 13)  SpO2: 99% (18 Aug 2023 07:23) (99% - 99%)        Constitutional: NAD    HEENT: PERRLA, EOMI,       Neck:  No JVD    Respiratory: CTAB/L    Cardiovascular: S1 and S2    Gastrointestinal: BS+, soft, NT/ND    Extremities: No peripheral edema    Neurological: A/O x 3, no focal deficits    Psychiatric: Normal mood, normal affect    : No Giles    Skin: No rashes    Comments:    ASA Class: I [ ]  II [X ]  III [ ]  IV [ ]    The patient is a suitable candidate for the planned procedure unless box checked [ ]  No, explain:

## 2023-08-21 ENCOUNTER — TRANSCRIPTION ENCOUNTER (OUTPATIENT)
Age: 53
End: 2023-08-21

## 2023-08-21 NOTE — HISTORY OF PRESENT ILLNESS
[FreeTextEntry1] : Ms. ANDREW AGUILAR  is a 52 year old  female who has a past medical history significant for FHX of MI 52 yrs in Father's Side - Hypothyroidism, Dysphagia, Poor esophageal motility, S/p tracheo- bronchoplasty (2018) with baseline SOB, Obesity, Mild ASCVD (On Chest CT), Bouts of SVT, Statin Intolerance to Crestor/Atorvastatin (Baseline LDLc in 190s) currently on Nexlitol  who presents to the office for a follow up evaluation. Patient feels generally well today. Denies SOB, chest pain, palpitations, dizziness, and syncope.  Blood work on 2/2023 demonstrated: Triglycerides: 72 Total Cholesterol: 192 HDL:81 LDL: 96 Non-HDL: 111 ================ ================ 7/2022 ozempic - glucose intolerant/BMI 29 statin intolerant - on nexletol - dr carvalho added praluent   first injection of ozempic yesterday - extremely nauseated and not feeling well  ================ 8/2022 feeling much better - barely any nausea  dx with covid after speaking to me last - contributed to the nausea  0.25mg -  4 doses completed  walks 1hr and 20min per day lost 30lbs since march  diet is very good  Radha Valle - endo thyroid [facility] ================ 9/2022 0.5mg  - 4 doses administered  no nausea  149lbs  walk 1.5hrs per day with dog ================ 10/2022 1mg - 2 doses administered  no side effects breast reduction on the 26th walking 5 miles per day  hasn't weighed herself, but getting comments like "you've lost weight"  ============ 11/2022 feels well  currently 147lb - BMI 25 has plateaued with her weight 2mg has been on back order and has only been administering the 1mg =========== 12/2022 2mg - 2 doses. third injection will be this thursday   no side effects  walking - wants join the gym  dog getting chemo  ============ 8/2023 knee issues - just had it replaced.  walking 4 miles - would like to walk 7miles doing well on ozempic and praluent  123-125lbs

## 2023-08-21 NOTE — DISCUSSION/SUMMARY
[FreeTextEntry1] : Mild plaque/early fam hx ASCVD/Over weight /glucose intolerance  -will continue with ozempic 2mg once weekly - patient to continue praluent - has tried and failed repatha in the past and is tolerating praluent just fine  - The patient understands the importance of incorporating moderate aerobic exercise, 4 times/week for 40 minutes to reduce risk of CV disease. - A detailed discussion of lifestyle modification was done today. Patient understands the importance of a heart healthy diet and incorporating veggies/legumes/fruits/whole grains and limiting processed foods, sugars and carbs in their diet.   - The patient has a good understanding of the diagnosis, treatment plan and lifestyle modification.  she will contact me at the office for any questions with their care or any changes in their health status.   Case discussed with Dr Glenn Goldstein

## 2023-08-30 ENCOUNTER — APPOINTMENT (OUTPATIENT)
Dept: VASCULAR SURGERY | Facility: CLINIC | Age: 53
End: 2023-08-30
Payer: COMMERCIAL

## 2023-08-30 DIAGNOSIS — I87.2 VENOUS INSUFFICIENCY (CHRONIC) (PERIPHERAL): ICD-10-CM

## 2023-08-30 PROCEDURE — 99441: CPT

## 2023-08-30 NOTE — REASON FOR VISIT
[FreeTextEntry1] : my legs still bother me  [Home] : at home, [unfilled] , at the time of the visit. [Medical Office: ([facility])___] : at the medical office located in  [Other:____] : [unfilled] [Verbal consent obtained from patient] : the patient, [unfilled]

## 2023-08-30 NOTE — PHYSICAL EXAM
[No Rash or Lesion] : No rash or lesion [Alert] : alert [Oriented to Person] : oriented to person [Oriented to Place] : oriented to place [Oriented to Time] : oriented to time [Calm] : calm [de-identified] : no resp distress [FreeTextEntry1] : Physical exam findings via telephonic review with patient   [de-identified] : cooperative

## 2023-08-30 NOTE — HISTORY OF PRESENT ILLNESS
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 \par  \par  pt states rle c/o worse since march 2023 \par  Pt c/o right lleg pain swelling heaviness and discomfort worse w activity and by the end of the day\par  Onset worse since march 2023 \par  Intensity severe\par  Pt denies recent injury, travel or thrombophilic event\par  pt also c/o post op from TKR of right medial prox calf  pain \par  \par  Pt  denies  art insuff sx \par  \par  Pt denies any recent  cardiac c/o , SOB, Chest Pain or recent heart attacks \par  \par  \par  \par   [de-identified] : pt is complaint w comp stockings pt states overall  evelyn le  c/o remain pt was advised by ortho she does not need additional pt/ot pt underwent at outside facility a venous duplex

## 2023-08-30 NOTE — ASSESSMENT
[FreeTextEntry1] : Impression  symptomatic venous insuff not yet sono evident w hx of  Bakers cyst w s/o bakers Cyst rupture and ongoing sx   w slow resolution    Plan Med Conservative management leg elevation, knee high compression stockings 15-20mm Hg pt/ot for rt tkr as per ortho  ov w rle venous duplex 2 mo sept 2023 to re eval  presence of rle bakers cyst advised pt to forward  venous duplex report for review pt agrees to do so  Telephonic visit  time duration 7 min      Varicose veins are enlarged, twisted veins. Varicose veins are caused by increased blood pressure in the veins.  The blood moves towards the heart by 1-way valves in the veins. When the valves become weakened or damaged, blood can collect in the veins and pool in your lower legs (ankles). This causes the veins to become enlarged and incompetent with reflux. Sitting or standing for long periods can cause blood to pool in the leg veins, increasing the pressure within the veins.  Risk factors for varicose veins or venous disease may include:  obesity, older age, standing or sitting for prolonged periods of time for several years, being female, pregnancy, taking oral contraceptive pills or hormone replacement, being inactive, and/or smoking.  The most common symptoms of varicose veins are sensations in the legs, such as a heavy feeling, burning, and/or aching. However, each individual may experience symptoms differently.  Other symptoms may include:  color changes in the skin, sores on the legs, or rash.  Severe varicose veins or venous disease may eventually produce long-term mild swelling that can result in more serious skin and tissue problems, such as ulcers and non-healing sores. Varicose veins and venous disease are diagnosed by a complete medical history, physical examination, and diagnostic studies for varicose veins including duplex ultrasound and color-flow imaging.   Medical treatment for varicose veins and venous disease include:  compression stockings, sclerotherapy, endovenous ablation and/or surgical treatment with microphlebectomy.     [Arterial/Venous Disease] : arterial/venous disease [Other: _____] : [unfilled]

## 2023-09-06 ENCOUNTER — TRANSCRIPTION ENCOUNTER (OUTPATIENT)
Age: 53
End: 2023-09-06

## 2023-09-27 RX ORDER — SEMAGLUTIDE 2.68 MG/ML
8 INJECTION, SOLUTION SUBCUTANEOUS
Qty: 1 | Refills: 5 | Status: ACTIVE | COMMUNITY
Start: 2023-09-27 | End: 1900-01-01

## 2023-10-04 ENCOUNTER — RX RENEWAL (OUTPATIENT)
Age: 53
End: 2023-10-04

## 2023-10-31 ENCOUNTER — APPOINTMENT (OUTPATIENT)
Dept: VASCULAR SURGERY | Facility: CLINIC | Age: 53
End: 2023-10-31
Payer: COMMERCIAL

## 2023-10-31 VITALS
SYSTOLIC BLOOD PRESSURE: 123 MMHG | HEART RATE: 75 BPM | TEMPERATURE: 97.7 F | WEIGHT: 124 LBS | BODY MASS INDEX: 21.17 KG/M2 | DIASTOLIC BLOOD PRESSURE: 87 MMHG | HEIGHT: 64 IN

## 2023-10-31 DIAGNOSIS — M66.0 RUPTURE OF POPLITEAL CYST: ICD-10-CM

## 2023-10-31 DIAGNOSIS — I83.893 VARICOSE VEINS OF BILATERAL LOWER EXTREMITIES WITH OTHER COMPLICATIONS: ICD-10-CM

## 2023-10-31 PROCEDURE — 93971 EXTREMITY STUDY: CPT | Mod: RT

## 2023-10-31 PROCEDURE — ZZZZZ: CPT

## 2023-10-31 PROCEDURE — 99214 OFFICE O/P EST MOD 30 MIN: CPT

## 2023-11-07 ENCOUNTER — RX RENEWAL (OUTPATIENT)
Age: 53
End: 2023-11-07

## 2023-11-28 RX ORDER — FLECAINIDE ACETATE 50 MG/1
50 TABLET ORAL
Qty: 180 | Refills: 1 | Status: ACTIVE | COMMUNITY
Start: 2023-02-22 | End: 1900-01-01

## 2023-12-05 ENCOUNTER — APPOINTMENT (OUTPATIENT)
Dept: CARDIOLOGY | Facility: CLINIC | Age: 53
End: 2023-12-05
Payer: COMMERCIAL

## 2023-12-05 ENCOUNTER — NON-APPOINTMENT (OUTPATIENT)
Age: 53
End: 2023-12-05

## 2023-12-05 VITALS
HEART RATE: 72 BPM | SYSTOLIC BLOOD PRESSURE: 110 MMHG | WEIGHT: 130 LBS | DIASTOLIC BLOOD PRESSURE: 84 MMHG | BODY MASS INDEX: 22.31 KG/M2 | OXYGEN SATURATION: 100 %

## 2023-12-05 PROCEDURE — 93000 ELECTROCARDIOGRAM COMPLETE: CPT

## 2023-12-05 PROCEDURE — 99215 OFFICE O/P EST HI 40 MIN: CPT | Mod: 25

## 2023-12-09 ENCOUNTER — APPOINTMENT (OUTPATIENT)
Dept: CARDIOLOGY | Facility: CLINIC | Age: 53
End: 2023-12-09
Payer: COMMERCIAL

## 2023-12-09 PROCEDURE — 93306 TTE W/DOPPLER COMPLETE: CPT

## 2023-12-13 ENCOUNTER — OUTPATIENT (OUTPATIENT)
Dept: OUTPATIENT SERVICES | Facility: HOSPITAL | Age: 53
LOS: 1 days | End: 2023-12-13
Payer: COMMERCIAL

## 2023-12-13 ENCOUNTER — APPOINTMENT (OUTPATIENT)
Dept: CT IMAGING | Facility: CLINIC | Age: 53
End: 2023-12-13
Payer: COMMERCIAL

## 2023-12-13 DIAGNOSIS — Z98.49 CATARACT EXTRACTION STATUS, UNSPECIFIED EYE: Chronic | ICD-10-CM

## 2023-12-13 DIAGNOSIS — Z98.890 OTHER SPECIFIED POSTPROCEDURAL STATES: Chronic | ICD-10-CM

## 2023-12-13 DIAGNOSIS — R07.9 CHEST PAIN, UNSPECIFIED: ICD-10-CM

## 2023-12-13 DIAGNOSIS — Z98.1 ARTHRODESIS STATUS: Chronic | ICD-10-CM

## 2023-12-13 DIAGNOSIS — Z90.49 ACQUIRED ABSENCE OF OTHER SPECIFIED PARTS OF DIGESTIVE TRACT: Chronic | ICD-10-CM

## 2023-12-13 DIAGNOSIS — Z00.8 ENCOUNTER FOR OTHER GENERAL EXAMINATION: ICD-10-CM

## 2023-12-13 DIAGNOSIS — Z98.51 TUBAL LIGATION STATUS: Chronic | ICD-10-CM

## 2023-12-13 DIAGNOSIS — Z86.018 PERSONAL HISTORY OF OTHER BENIGN NEOPLASM: Chronic | ICD-10-CM

## 2023-12-13 PROCEDURE — 75574 CT ANGIO HRT W/3D IMAGE: CPT | Mod: 26

## 2023-12-13 PROCEDURE — 75574 CT ANGIO HRT W/3D IMAGE: CPT

## 2023-12-26 ENCOUNTER — APPOINTMENT (OUTPATIENT)
Dept: ORTHOPEDIC SURGERY | Facility: CLINIC | Age: 53
End: 2023-12-26
Payer: COMMERCIAL

## 2023-12-26 DIAGNOSIS — Z96.651 PRESENCE OF RIGHT ARTIFICIAL KNEE JOINT: ICD-10-CM

## 2023-12-26 PROCEDURE — 73562 X-RAY EXAM OF KNEE 3: CPT | Mod: RT

## 2023-12-26 PROCEDURE — 99213 OFFICE O/P EST LOW 20 MIN: CPT

## 2023-12-26 NOTE — IMAGING
[Right] : right knee [AP] : anteroposterior [Lateral] : lateral [[place]] : [place] [Components well fixed, in good position] : Components well fixed, in good position

## 2023-12-26 NOTE — PHYSICAL EXAM
[Right] : right knee [] : no sign of infection [TWNoteComboBox7] : flexion 110 degrees [de-identified] : extension 3 degrees

## 2023-12-26 NOTE — ASSESSMENT
[FreeTextEntry1] : S/P RT TKA 3/24/23: NO F/C/S. C/O CONSISTENT PAIN, STILL WITH POSTERIOR KNEE PAIN THAT RADIATES DOWN TO THE CALF WITH ASSOCIATED HEAVINESS. H/O LUMBAR ISSUES, HAS BEEN UNDER THE CARE OF DR. RM IN THE PAST. XRAYS REVIEWED WITH COMPONENTS WELL FIXED. RT KNEE MRI 8/2/23 REVIEWED WNL S/P RT TKA. NO SIGNS OF INFECTION. GOOD LIGAMENT BALANCE ON EXAM. ABX AND PRECAUTIONS AGAIN REVIEWED. QUESTIONS ANSWERED. ADVISED TO FOLLOW UP WITH DR. RM FOR LUMBAR ISSUES

## 2023-12-26 NOTE — HISTORY OF PRESENT ILLNESS
[8] : 8 [6] : 6 [Dull/Aching] : dull/aching [Leisure] : leisure [Nothing helps with pain getting better] : Nothing helps with pain getting better [Standing] : standing [Walking] : walking [Stairs] : stairs [Not working due to injury] : Work status: not working due to injury [de-identified] : 08/15/23 here to review mri results on the right knee, no changes since last visit .  12/26/23  FOLLOW UP S/P RIGHT TKA STILL IN PAIN  [] : Post Surgical Visit: no [FreeTextEntry1] : right knee [FreeTextEntry5] : Here today for follow up right knee. Pain and swelling worse. Takes tylenol when needed with no relief. [FreeTextEntry6] : swelling [de-identified] : mri done at [facility]  [de-identified] : sx, tylenol

## 2024-01-18 ENCOUNTER — RX RENEWAL (OUTPATIENT)
Age: 54
End: 2024-01-18

## 2024-01-24 ENCOUNTER — APPOINTMENT (OUTPATIENT)
Dept: ORTHOPEDIC SURGERY | Facility: CLINIC | Age: 54
End: 2024-01-24

## 2024-02-19 ENCOUNTER — APPOINTMENT (OUTPATIENT)
Dept: CT IMAGING | Facility: CLINIC | Age: 54
End: 2024-02-19
Payer: COMMERCIAL

## 2024-02-19 ENCOUNTER — OUTPATIENT (OUTPATIENT)
Dept: OUTPATIENT SERVICES | Facility: HOSPITAL | Age: 54
LOS: 1 days | End: 2024-02-19
Payer: COMMERCIAL

## 2024-02-19 DIAGNOSIS — Z98.890 OTHER SPECIFIED POSTPROCEDURAL STATES: Chronic | ICD-10-CM

## 2024-02-19 DIAGNOSIS — Z98.51 TUBAL LIGATION STATUS: Chronic | ICD-10-CM

## 2024-02-19 DIAGNOSIS — Z98.1 ARTHRODESIS STATUS: Chronic | ICD-10-CM

## 2024-02-19 DIAGNOSIS — Z86.018 PERSONAL HISTORY OF OTHER BENIGN NEOPLASM: Chronic | ICD-10-CM

## 2024-02-19 DIAGNOSIS — J39.8 OTHER SPECIFIED DISEASES OF UPPER RESPIRATORY TRACT: ICD-10-CM

## 2024-02-19 DIAGNOSIS — Z98.49 CATARACT EXTRACTION STATUS, UNSPECIFIED EYE: Chronic | ICD-10-CM

## 2024-02-19 DIAGNOSIS — Z90.49 ACQUIRED ABSENCE OF OTHER SPECIFIED PARTS OF DIGESTIVE TRACT: Chronic | ICD-10-CM

## 2024-02-19 PROCEDURE — 71250 CT THORAX DX C-: CPT

## 2024-02-19 PROCEDURE — 71250 CT THORAX DX C-: CPT | Mod: 26

## 2024-03-04 ENCOUNTER — APPOINTMENT (OUTPATIENT)
Dept: ORTHOPEDIC SURGERY | Facility: CLINIC | Age: 54
End: 2024-03-04

## 2024-03-05 ENCOUNTER — NON-APPOINTMENT (OUTPATIENT)
Age: 54
End: 2024-03-05

## 2024-03-05 ENCOUNTER — APPOINTMENT (OUTPATIENT)
Dept: CARDIOLOGY | Facility: CLINIC | Age: 54
End: 2024-03-05
Payer: COMMERCIAL

## 2024-03-05 VITALS
DIASTOLIC BLOOD PRESSURE: 80 MMHG | OXYGEN SATURATION: 99 % | BODY MASS INDEX: 21.28 KG/M2 | HEART RATE: 74 BPM | SYSTOLIC BLOOD PRESSURE: 110 MMHG | WEIGHT: 124 LBS

## 2024-03-05 DIAGNOSIS — E66.3 OVERWEIGHT: ICD-10-CM

## 2024-03-05 PROCEDURE — 99402 PREV MED CNSL INDIV APPRX 30: CPT

## 2024-03-05 PROCEDURE — G2211 COMPLEX E/M VISIT ADD ON: CPT | Mod: NC,1L

## 2024-03-05 PROCEDURE — 93000 ELECTROCARDIOGRAM COMPLETE: CPT

## 2024-03-05 PROCEDURE — 99215 OFFICE O/P EST HI 40 MIN: CPT | Mod: 25

## 2024-03-09 ENCOUNTER — APPOINTMENT (OUTPATIENT)
Dept: ORTHOPEDIC SURGERY | Facility: CLINIC | Age: 54
End: 2024-03-09
Payer: COMMERCIAL

## 2024-03-09 VITALS — BODY MASS INDEX: 21.17 KG/M2 | WEIGHT: 124 LBS | HEIGHT: 64 IN

## 2024-03-09 DIAGNOSIS — M54.12 RADICULOPATHY, CERVICAL REGION: ICD-10-CM

## 2024-03-09 DIAGNOSIS — M54.16 RADICULOPATHY, LUMBAR REGION: ICD-10-CM

## 2024-03-09 PROCEDURE — 72170 X-RAY EXAM OF PELVIS: CPT

## 2024-03-09 PROCEDURE — 99214 OFFICE O/P EST MOD 30 MIN: CPT

## 2024-03-09 PROCEDURE — 72040 X-RAY EXAM NECK SPINE 2-3 VW: CPT

## 2024-03-09 PROCEDURE — 72110 X-RAY EXAM L-2 SPINE 4/>VWS: CPT

## 2024-03-09 NOTE — DATA REVIEWED
[MRI] : MRI [Report was reviewed and noted in the chart] : The report was reviewed and noted in the chart [I independently reviewed and interpreted images and report] : I independently reviewed and interpreted images and report [Cervical Spine] : cervical spine

## 2024-03-10 NOTE — DISCUSSION/SUMMARY
[Surgical risks reviewed] : Surgical risks reviewed [de-identified] : reviewed the case and the imaging of the neck and the lower back   the neck a few years out - looks like the acdf is healed - there is significant degnerative changes below the fusion and that is likely causing some of the pain  we discussed could consider further imaging and possibly even consider further surgery which would likely be extension of the fusion down to the C7  lower back painful and with symptoms in the right leg the xrays look okay  indicated for mri L spine   rec conversative tx in the meantime

## 2024-03-10 NOTE — HISTORY OF PRESENT ILLNESS
[Lower back] : lower back [Dull/Aching] : dull/aching [Sharp] : sharp [Nothing helps with pain getting better] : Nothing helps with pain getting better [Neck] : neck [Gradual] : gradual [7] : 7 [Localized] : localized [Household chores] : household chores [Constant] : constant [Sleep] : sleep [Leisure] : leisure [Meds] : meds [Lying in bed] : lying in bed [Heat] : heat [Part time] : Work status: part time [de-identified] : 7/28/21: Here for f/u. Plan at last "Will eventually seek surgery for left shoulder. As far as neck has somewhat mild degenerative changes - no specific treatment indicated for this - suspect the shoulder the main issue based on current presentation of symptoms." She states she was initially doing well after surgery, but has since gotten increased pain after the first couple of sessions of PT. She states she has slowly improved, but the neck has been more bothersome lately.   Had surgery with Dr Felder and was referred here for her neck   had an episode with steroids which weren't helped  8/11/21: Here for MRI review. Plan at last "recently had left shoulder surgery and has pain in the left shoulder - referred for updated neck eval - indicated for updated MRI of the C spine for eval" - pain in the neck and to the left shoulder   MRI C-spine 8/4/21: 1. C3-4: Left foraminal disc herniation causing moderate left foraminal stenosis. Dorsal annular fissure. No change. 2. C4-5: Broad-based central disc herniation impinging the ventral spinal cord. Dorsal annular fissure. Disc space narrowing. No change. 3. C6-7: Disc bulge abuts the ventral spinal cord. The bulge narrows the neuroforamina. Disc space narrowing and chronic vertebral endplate changes. No change. 4. C7-T1: Central disc herniation indents the thecal sac. No change.  3/12/22: plan last visit - "rec PT - reviewed the updated MRi - no change - lidocaine patch - no good surgical target" - here back for the neck - has had injections with Dr Sanches and has seen neurologic - no relief - neck pain and into the left shoulder and into the left arm remains - it is not responding to any treatment - pain limits her activity   saw neurology and had further tests done and was told everything was coming from the neck  5/25/22: Here for follow-up; plan last visit was, " reviewed the case with her - persistent pain in the neck and the left side of the shoulder and into the left arm - reviewed tHE mri AND THERE IS neurologic narrowing in a correlating fashion - has tried extensive conservative tx and seen multiple providers who have been unable to provide relief and also have indicated that they feel her neck is the source of her pain - in light of all this she is indicated for. acdf C3-6. discussion of the surgery - the r/b/e discussed at length. she is well informed and would like to proceed  here for pre op   neck pain and into the left shoulder remains  lwoer back pain and into the rigth thight  has new mris which we reviewed today of the C and L spine   MRi C spine - left sided NF stenosis C3-6 MRI L spine - mild stenosis L3-4 and L4-5   6/21/22: Here for fu post op - wound has been dry - has been suffering with constipation/nausua - not really able to take pain medications due to the side effects - wearing collar - swallowing slowly improving  xrays today: C spine - implants in good position  7/29/22- Here for fu post op. 8 weeks s/p  acdf C3-6 performed 06/02/22. having some neck pain - swallowing better   xrays today: C spine - healing well   8/22/22: Here for fu - plan at last was "post op care discussed  bone stim fu 4 weeks  no heavy lifting OOW at this point" - overall doing a bit better - pain in the left lateral upper arm   using advil and celebrex at times  bone stim wasn't approved   xrays today:  C spine - healing acdf   12/19/22: Here for - now about 6 months s/p acdf - having more pain in the lower part of the neck - she cant really it go away with certain positions - sides of the neck painful   working   xrays today: C spine - healed up   12/26/22: here for fu - plan at last was "post op care discussed  working  has some celebrex  MRI C spine indicated for worsening pain" - overall doing about the same as last visit   mri C spine - Postoperative study following C3-4 and C4-5 discectomy and fusion without spinal cord or nerve root  compression at the operated level. Small disc herniation at C2-3 without spinal cord or nerve root compression unchanged from the most recent  prior MRI. Mild degenerative disc disease at C5-6 without progression from the prior MRI. Moderate degenerative disc disease with a disc herniation and small endplate osteophytes at C6-7 without spinal  cord compression, nerve root compression or change from the most recent prior MRI. Small disc herniation at C7-T1 without spinal cord or nerve root compression or change from the most recent  prior MRI. Mild left-sided facet osteoarthrosis at C3-4 and C4-5 without progression from the prior MRI. The bone marrow edema seen in the left articular process of C3 on the most recent prior MRI has resolved.  3/9/24: Here for eval of the right knee/pain - Had knee replacement with Dr Read - pain in the right leg - left leg is okay - has numbness in the feet   Now with bladder leakage - she saw urology and was told it was okay  had bladder sling years ago and was told it was okay   Having persistent knee pain and swelling   xrays today: C spine - healed acdf C3-5  L spine - mild spondylosis  AP PELVIS - negative  [FreeTextEntry5] : Patient is here for follow up of neck.  [FreeTextEntry7] : down the RT Leg [] : no [de-identified] : turning neck [FreeTextEntry9] : 800 mg Motrin

## 2024-03-13 ENCOUNTER — APPOINTMENT (OUTPATIENT)
Dept: MRI IMAGING | Facility: CLINIC | Age: 54
End: 2024-03-13

## 2024-03-18 ENCOUNTER — OUTPATIENT (OUTPATIENT)
Dept: OUTPATIENT SERVICES | Facility: HOSPITAL | Age: 54
LOS: 1 days | End: 2024-03-18

## 2024-03-18 ENCOUNTER — APPOINTMENT (OUTPATIENT)
Dept: MRI IMAGING | Facility: CLINIC | Age: 54
End: 2024-03-18
Payer: COMMERCIAL

## 2024-03-18 ENCOUNTER — RESULT REVIEW (OUTPATIENT)
Age: 54
End: 2024-03-18

## 2024-03-18 DIAGNOSIS — Z86.018 PERSONAL HISTORY OF OTHER BENIGN NEOPLASM: Chronic | ICD-10-CM

## 2024-03-18 DIAGNOSIS — Z98.890 OTHER SPECIFIED POSTPROCEDURAL STATES: Chronic | ICD-10-CM

## 2024-03-18 DIAGNOSIS — Z98.1 ARTHRODESIS STATUS: Chronic | ICD-10-CM

## 2024-03-18 DIAGNOSIS — M54.16 RADICULOPATHY, LUMBAR REGION: ICD-10-CM

## 2024-03-18 DIAGNOSIS — Z98.51 TUBAL LIGATION STATUS: Chronic | ICD-10-CM

## 2024-03-18 DIAGNOSIS — Z98.49 CATARACT EXTRACTION STATUS, UNSPECIFIED EYE: Chronic | ICD-10-CM

## 2024-03-18 DIAGNOSIS — Z90.49 ACQUIRED ABSENCE OF OTHER SPECIFIED PARTS OF DIGESTIVE TRACT: Chronic | ICD-10-CM

## 2024-03-18 DIAGNOSIS — Z00.8 ENCOUNTER FOR OTHER GENERAL EXAMINATION: ICD-10-CM

## 2024-03-18 PROCEDURE — 72148 MRI LUMBAR SPINE W/O DYE: CPT | Mod: 26

## 2024-03-21 ENCOUNTER — APPOINTMENT (OUTPATIENT)
Dept: THORACIC SURGERY | Facility: CLINIC | Age: 54
End: 2024-03-21
Payer: COMMERCIAL

## 2024-03-21 ENCOUNTER — NON-APPOINTMENT (OUTPATIENT)
Age: 54
End: 2024-03-21

## 2024-03-21 VITALS
HEIGHT: 64 IN | HEART RATE: 102 BPM | SYSTOLIC BLOOD PRESSURE: 121 MMHG | WEIGHT: 124 LBS | OXYGEN SATURATION: 100 % | DIASTOLIC BLOOD PRESSURE: 80 MMHG | RESPIRATION RATE: 17 BRPM | BODY MASS INDEX: 21.17 KG/M2

## 2024-03-21 PROCEDURE — 99214 OFFICE O/P EST MOD 30 MIN: CPT

## 2024-03-21 NOTE — PHYSICAL EXAM
[Fully active, able to carry on all pre-disease performance without restriction] : Status 0 - Fully active, able to carry on all pre-disease performance without restriction [Sclera] : the sclera and conjunctiva were normal [Extraocular Movements] : extraocular movements were intact [Neck Appearance] : the appearance of the neck was normal [Neck Cervical Mass (___cm)] : no neck mass was observed [] : no respiratory distress [Respiration, Rhythm And Depth] : normal respiratory rhythm and effort [Exaggerated Use Of Accessory Muscles For Inspiration] : no accessory muscle use [Heart Rate And Rhythm] : heart rate was normal and rhythm regular [Auscultation Breath Sounds / Voice Sounds] : lungs were clear to auscultation bilaterally [Chest Visual Inspection Thoracic Asymmetry] : no chest asymmetry [Examination Of The Chest] : the chest was normal in appearance [Diminished Respiratory Excursion] : normal chest expansion [2+] : left 2+ [Cervical Lymph Nodes Enlarged Posterior Bilaterally] : posterior cervical [Cervical Lymph Nodes Enlarged Anterior Bilaterally] : anterior cervical [Supraclavicular Lymph Nodes Enlarged Bilaterally] : supraclavicular [No CVA Tenderness] : no ~M costovertebral angle tenderness [No Spinal Tenderness] : no spinal tenderness [Involuntary Movements] : no involuntary movements were seen [Oriented To Time, Place, And Person] : oriented to person, place, and time Oxybutynin Counseling:  I discussed with the patient the risks of oxybutynin including but not limited to skin rash, drowsiness, dry mouth, difficulty urinating, and blurred vision. 5-Fu Counseling: 5-Fluorouracil Counseling:  I discussed with the patient the risks of 5-fluorouracil including but not limited to erythema, scaling, itching, weeping, crusting, and pain. Methotrexate Pregnancy And Lactation Text: This medication is Pregnancy Category X and is known to cause fetal harm. This medication is excreted in breast milk. Cephalexin Pregnancy And Lactation Text: This medication is Pregnancy Category B and considered safe during pregnancy.  It is also excreted in breast milk but can be used safely for shorter doses. Dupixent Pregnancy And Lactation Text: This medication likely crosses the placenta but the risk for the fetus is uncertain. This medication is excreted in breast milk. Colchicine Pregnancy And Lactation Text: This medication is Pregnancy Category C and isn't considered safe during pregnancy. It is excreted in breast milk. Tetracycline Pregnancy And Lactation Text: This medication is Pregnancy Category D and not consider safe during pregnancy. It is also excreted in breast milk. Spironolactone Counseling: Patient advised regarding risks of diarrhea, abdominal pain, hyperkalemia, birth defects (for female patients), liver toxicity and renal toxicity. The patient may need blood work to monitor liver and kidney function and potassium levels while on therapy. The patient verbalized understanding of the proper use and possible adverse effects of spironolactone.  All of the patient's questions and concerns were addressed. Stelara Pregnancy And Lactation Text: This medication is Pregnancy Category B and is considered safe during pregnancy. It is unknown if this medication is excreted in breast milk. Solaraze Pregnancy And Lactation Text: This medication is Pregnancy Category B and is considered safe. There is some data to suggest avoiding during the third trimester. It is unknown if this medication is excreted in breast milk. Oxybutynin Pregnancy And Lactation Text: This medication is Pregnancy Category B and is considered safe during pregnancy. It is unknown if it is excreted in breast milk. Carac Pregnancy And Lactation Text: This medication is Pregnancy Category X and contraindicated in pregnancy and in women who may become pregnant. It is unknown if this medication is excreted in breast milk. Methotrexate Counseling:  Patient counseled regarding adverse effects of methotrexate including but not limited to nausea, vomiting, abnormalities in liver function tests. Patients may develop mouth sores, rash, diarrhea, and abnormalities in blood counts. The patient understands that monitoring is required including LFT's and blood counts.  There is a rare possibility of scarring of the liver and lung problems that can occur when taking methotrexate. Persistent nausea, loss of appetite, pale stools, dark urine, cough, and shortness of breath should be reported immediately. Patient advised to discontinue methotrexate treatment at least three months before attempting to become pregnant.  I discussed the need for folate supplements while taking methotrexate.  These supplements can decrease side effects during methotrexate treatment. The patient verbalized understanding of the proper use and possible adverse effects of methotrexate.  All of the patient's questions and concerns were addressed. Cephalexin Counseling: I counseled the patient regarding use of cephalexin as an antibiotic for prophylactic and/or therapeutic purposes. Cephalexin (commonly prescribed under brand name Keflex) is a cephalosporin antibiotic which is active against numerous classes of bacteria, including most skin bacteria. Side effects may include nausea, diarrhea, gastrointestinal upset, rash, hives, yeast infections, and in rare cases, hepatitis, kidney disease, seizures, fever, confusion, neurologic symptoms, and others. Patients with severe allergies to penicillin medications are cautioned that there is about a 10% incidence of cross-reactivity with cephalosporins. When possible, patients with penicillin allergies should use alternatives to cephalosporins for antibiotic therapy. Dupixent Counseling: I discussed with the patient the risks of dupilumab including but not limited to eye infection and irritation, cold sores, injection site reactions, worsening of asthma, allergic reactions and increased risk of parasitic infection.  Live vaccines should be avoided while taking dupilumab. Dupilumab will also interact with certain medications such as warfarin and cyclosporine. The patient understands that monitoring is required and they must alert us or the primary physician if symptoms of infection or other concerning signs are noted. Dapsone Counseling: I discussed with the patient the risks of dapsone including but not limited to hemolytic anemia, agranulocytosis, rashes, methemoglobinemia, kidney failure, peripheral neuropathy, headaches, GI upset, and liver toxicity.  Patients who start dapsone require monitoring including baseline LFTs and weekly CBCs for the first month, then every month thereafter.  The patient verbalized understanding of the proper use and possible adverse effects of dapsone.  All of the patient's questions and concerns were addressed. Tetracycline Counseling: Patient counseled regarding possible photosensitivity and increased risk for sunburn.  Patient instructed to avoid sunlight, if possible.  When exposed to sunlight, patients should wear protective clothing, sunglasses, and sunscreen.  The patient was instructed to call the office immediately if the following severe adverse effects occur:  hearing changes, easy bruising/bleeding, severe headache, or vision changes.  The patient verbalized understanding of the proper use and possible adverse effects of tetracycline.  All of the patient's questions and concerns were addressed. Patient understands to avoid pregnancy while on therapy due to potential birth defects. Stelara Counseling:  I discussed with the patient the risks of ustekinumab including but not limited to immunosuppression, malignancy, posterior leukoencephalopathy syndrome, and serious infections.  The patient understands that monitoring is required including a PPD at baseline and must alert us or the primary physician if symptoms of infection or other concerning signs are noted. Birth Control Pills Counseling: Birth Control Pill Counseling: I discussed with the patient the potential side effects of OCPs including but not limited to increased risk of stroke, heart attack, thrombophlebitis, deep venous thrombosis, hepatic adenomas, breast changes, GI upset, headaches, and depression.  The patient verbalized understanding of the proper use and possible adverse effects of OCPs. All of the patient's questions and concerns were addressed. Carac Counseling:  I discussed with the patient the risks of Carac including but not limited to erythema, scaling, itching, weeping, crusting, and pain. Cyclosporine Pregnancy And Lactation Text: This medication is Pregnancy Category C and it isn't know if it is safe during pregnancy. This medication is excreted in breast milk. Bactrim Pregnancy And Lactation Text: This medication is Pregnancy Category D and is known to cause fetal risk.  It is also excreted in breast milk. Topical Retinoid counseling:  Patient advised to apply a pea-sized amount only at bedtime and wait 30 minutes after washing their face before applying.  If too drying, patient may add a non-comedogenic moisturizer. The patient verbalized understanding of the proper use and possible adverse effects of retinoids.  All of the patient's questions and concerns were addressed. Skyrizi Pregnancy And Lactation Text: The risk during pregnancy and breastfeeding is uncertain with this medication. Dapsone Pregnancy And Lactation Text: This medication is Pregnancy Category C and is not considered safe during pregnancy or breast feeding. Birth Control Pills Pregnancy And Lactation Text: This medication should be avoided if pregnant and for the first 30 days post-partum. Cyclosporine Counseling:  I discussed with the patient the risks of cyclosporine including but not limited to hypertension, gingival hyperplasia,myelosuppression, immunosuppression, liver damage, kidney damage, neurotoxicity, lymphoma, and serious infections. The patient understands that monitoring is required including baseline blood pressure, CBC, CMP, lipid panel and uric acid, and then 1-2 times monthly CMP and blood pressure. Topical Retinoid Pregnancy And Lactation Text: This medication is Pregnancy Category C. It is unknown if this medication is excreted in breast milk. Bactrim Counseling:  I discussed with the patient the risks of sulfa antibiotics including but not limited to GI upset, allergic reaction, drug rash, diarrhea, dizziness, photosensitivity, and yeast infections.  Rarely, more serious reactions can occur including but not limited to aplastic anemia, agranulocytosis, methemoglobinemia, blood dyscrasias, liver or kidney failure, lung infiltrates or desquamative/blistering drug rashes. Benzoyl Peroxide Pregnancy And Lactation Text: This medication is Pregnancy Category C. It is unknown if benzoyl peroxide is excreted in breast milk. Sarecycline Counseling: Patient advised regarding possible photosensitivity and discoloration of the teeth, skin, lips, tongue and gums.  Patient instructed to avoid sunlight, if possible.  When exposed to sunlight, patients should wear protective clothing, sunglasses, and sunscreen.  The patient was instructed to call the office immediately if the following severe adverse effects occur:  hearing changes, easy bruising/bleeding, severe headache, or vision changes.  The patient verbalized understanding of the proper use and possible adverse effects of sarecycline.  All of the patient's questions and concerns were addressed. Skyrizi Counseling: I discussed with the patient the risks of risankizumab-rzaa including but not limited to immunosuppression, and serious infections.  The patient understands that monitoring is required including a PPD at baseline and must alert us or the primary physician if symptoms of infection or other concerning signs are noted. Cosentyx Counseling:  I discussed with the patient the risks of Cosentyx including but not limited to worsening of Crohn's disease, immunosuppression, allergic reactions and infections.  The patient understands that monitoring is required including a PPD at baseline and must alert us or the primary physician if symptoms of infection or other concerning signs are noted. Erivedge Counseling- I discussed with the patient the risks of Erivedge including but not limited to nausea, vomiting, diarrhea, constipation, weight loss, changes in the sense of taste, decreased appetite, muscle spasms, and hair loss.  The patient verbalized understanding of the proper use and possible adverse effects of Erivedge.  All of the patient's questions and concerns were addressed. Doxepin Counseling:  Patient advised that the medication is sedating and not to drive a car after taking this medication. Patient informed of potential adverse effects including but not limited to dry mouth, urinary retention, and blurry vision.  The patient verbalized understanding of the proper use and possible adverse effects of doxepin.  All of the patient's questions and concerns were addressed. Cyclophosphamide Pregnancy And Lactation Text: This medication is Pregnancy Category D and it isn't considered safe during pregnancy. This medication is excreted in breast milk. Tazorac Counseling:  Patient advised that medication is irritating and drying.  Patient may need to apply sparingly and wash off after an hour before eventually leaving it on overnight.  The patient verbalized understanding of the proper use and possible adverse effects of tazorac.  All of the patient's questions and concerns were addressed. Benzoyl Peroxide Counseling: Patient counseled that medicine may cause skin irritation and bleach clothing.  In the event of skin irritation, the patient was advised to reduce the amount of the drug applied or use it less frequently.   The patient verbalized understanding of the proper use and possible adverse effects of benzoyl peroxide.  All of the patient's questions and concerns were addressed. Azithromycin Pregnancy And Lactation Text: This medication is considered safe during pregnancy and is also secreted in breast milk. Rifampin Pregnancy And Lactation Text: This medication is Pregnancy Category C and it isn't know if it is safe during pregnancy. It is also excreted in breast milk and should not be used if you are breast feeding. Cimzia Pregnancy And Lactation Text: This medication crosses the placenta but can be considered safe in certain situations. Cimzia may be excreted in breast milk. Erivedge Pregnancy And Lactation Text: This medication is Pregnancy Category X and is absolutely contraindicated during pregnancy. It is unknown if it is excreted in breast milk. Cimetidine Pregnancy And Lactation Text: This medication is Pregnancy Category B and is considered safe during pregnancy. It is also excreted in breast milk and breast feeding isn't recommended. Cyclophosphamide Counseling:  I discussed with the patient the risks of cyclophosphamide including but not limited to hair loss, hormonal abnormalities, decreased fertility, abdominal pain, diarrhea, nausea and vomiting, bone marrow suppression and infection. The patient understands that monitoring is required while taking this medication. Tazorac Pregnancy And Lactation Text: This medication is not safe during pregnancy. It is unknown if this medication is excreted in breast milk. Minoxidil Pregnancy And Lactation Text: This medication has not been assigned a Pregnancy Risk Category but animal studies failed to show danger with the topical medication. It is unknown if the medication is excreted in breast milk. Azithromycin Counseling:  I discussed with the patient the risks of azithromycin including but not limited to GI upset, allergic reaction, drug rash, diarrhea, and yeast infections. Cimzia Counseling:  I discussed with the patient the risks of Cimzia including but not limited to immunosuppression, allergic reactions and infections.  The patient understands that monitoring is required including a PPD at baseline and must alert us or the primary physician if symptoms of infection or other concerning signs are noted. Gabapentin Counseling: I discussed with the patient the risks of gabapentin including but not limited to dizziness, somnolence, fatigue and ataxia. Rifampin Counseling: I discussed with the patient the risks of rifampin including but not limited to liver damage, kidney damage, red-orange body fluids, nausea/vomiting and severe allergy. Simponi Counseling:  I discussed with the patient the risks of golimumab including but not limited to myelosuppression, immunosuppression, autoimmune hepatitis, demyelinating diseases, lymphoma, and serious infections.  The patient understands that monitoring is required including a PPD at baseline and must alert us or the primary physician if symptoms of infection or other concerning signs are noted. Topical Clindamycin Counseling: Patient counseled that this medication may cause skin irritation or allergic reactions.  In the event of skin irritation, the patient was advised to reduce the amount of the drug applied or use it less frequently.   The patient verbalized understanding of the proper use and possible adverse effects of clindamycin.  All of the patient's questions and concerns were addressed. Cimetidine Counseling:  I discussed with the patient the risks of Cimetidine including but not limited to gynecomastia, headache, diarrhea, nausea, drowsiness, arrhythmias, pancreatitis, skin rashes, psychosis, bone marrow suppression and kidney toxicity. Minoxidil Counseling: Minoxidil is a topical medication which can increase blood flow where it is applied. It is uncertain how this medication increases hair growth. Side effects are uncommon and include stinging and allergic reactions. High Dose Vitamin A Pregnancy And Lactation Text: High dose vitamin A therapy is contraindicated during pregnancy and breast feeding. Cellcept Pregnancy And Lactation Text: This medication is Pregnancy Category D and isn't considered safe during pregnancy. It is unknown if this medication is excreted in breast milk. Quinolones Pregnancy And Lactation Text: This medication is Pregnancy Category C and it isn't know if it is safe during pregnancy. It is also excreted in breast milk. High Dose Vitamin A Counseling: Side effects reviewed, pt to contact office should one occur. Cellcept Counseling:  I discussed with the patient the risks of mycophenolate mofetil including but not limited to infection/immunosuppression, GI upset, hypokalemia, hypercholesterolemia, bone marrow suppression, lymphoproliferative disorders, malignancy, GI ulceration/bleed/perforation, colitis, interstitial lung disease, kidney failure, progressive multifocal leukoencephalopathy, and birth defects.  The patient understands that monitoring is required including a baseline creatinine and regular CBC testing. In addition, patient must alert us immediately if symptoms of infection or other concerning signs are noted. Quinolones Counseling:  I discussed with the patient the risks of fluoroquinolones including but not limited to GI upset, allergic reaction, drug rash, diarrhea, dizziness, photosensitivity, yeast infections, liver function test abnormalities, tendonitis/tendon rupture. Siliq Counseling:  I discussed with the patient the risks of Siliq including but not limited to new or worsening depression, suicidal thoughts and behavior, immunosuppression, malignancy, posterior leukoencephalopathy syndrome, and serious infections.  The patient understands that monitoring is required including a PPD at baseline and must alert us or the primary physician if symptoms of infection or other concerning signs are noted. There is also a special program designed to monitor depression which is required with Siliq. Glycopyrrolate Counseling:  I discussed with the patient the risks of glycopyrrolate including but not limited to skin rash, drowsiness, dry mouth, difficulty urinating, and blurred vision. Imiquimod Counseling:  I discussed with the patient the risks of imiquimod including but not limited to erythema, scaling, itching, weeping, crusting, and pain.  Patient understands that the inflammatory response to imiquimod is variable from person to person and was educated regarded proper titration schedule.  If flu-like symptoms develop, patient knows to discontinue the medication and contact us. Isotretinoin Pregnancy And Lactation Text: This medication is Pregnancy Category X and is considered extremely dangerous during pregnancy. It is unknown if it is excreted in breast milk. Topical Sulfur Applications Counseling: Topical Sulfur Counseling: Patient counseled that this medication may cause skin irritation or allergic reactions.  In the event of skin irritation, the patient was advised to reduce the amount of the drug applied or use it less frequently.   The patient verbalized understanding of the proper use and possible adverse effects of topical sulfur application.  All of the patient's questions and concerns were addressed. Rituxan Pregnancy And Lactation Text: This medication is Pregnancy Category C and it isn't know if it is safe during pregnancy. It is unknown if this medication is excreted in breast milk but similar antibodies are known to be excreted. Glycopyrrolate Pregnancy And Lactation Text: This medication is Pregnancy Category B and is considered safe during pregnancy. It is unknown if it is excreted breast milk. Terbinafine Counseling: Patient counseling regarding adverse effects of terbinafine including but not limited to headache, diarrhea, rash, upset stomach, liver function test abnormalities, itching, taste/smell disturbance, nausea, abdominal pain, and flatulence.  There is a rare possibility of liver failure that can occur when taking terbinafine.  The patient understands that a baseline LFT and kidney function test may be required. The patient verbalized understanding of the proper use and possible adverse effects of terbinafine.  All of the patient's questions and concerns were addressed. Azathioprine Counseling:  I discussed with the patient the risks of azathioprine including but not limited to myelosuppression, immunosuppression, hepatotoxicity, lymphoma, and infections.  The patient understands that monitoring is required including baseline LFTs, Creatinine, possible TPMP genotyping and weekly CBCs for the first month and then every 2 weeks thereafter.  The patient verbalized understanding of the proper use and possible adverse effects of azathioprine.  All of the patient's questions and concerns were addressed. Ivermectin Pregnancy And Lactation Text: This medication is Pregnancy Category C and it isn't known if it is safe during pregnancy. It is also excreted in breast milk. Topical Sulfur Applications Pregnancy And Lactation Text: This medication is Pregnancy Category C and has an unknown safety profile during pregnancy. It is unknown if this topical medication is excreted in breast milk. Isotretinoin Counseling: Patient should get monthly blood tests, not donate blood, not drive at night if vision affected, not share medication, and not undergo elective surgery for 6 months after tx completed. Side effects reviewed, pt to contact office should one occur. Minocycline Counseling: Patient advised regarding possible photosensitivity and discoloration of the teeth, skin, lips, tongue and gums.  Patient instructed to avoid sunlight, if possible.  When exposed to sunlight, patients should wear protective clothing, sunglasses, and sunscreen.  The patient was instructed to call the office immediately if the following severe adverse effects occur:  hearing changes, easy bruising/bleeding, severe headache, or vision changes.  The patient verbalized understanding of the proper use and possible adverse effects of minocycline.  All of the patient's questions and concerns were addressed. Rituxan Counseling:  I discussed with the patient the risks of Rituxan infusions. Side effects can include infusion reactions, severe drug rashes including mucocutaneous reactions, reactivation of latent hepatitis and other infections and rarely progressive multifocal leukoencephalopathy.  All of the patient's questions and concerns were addressed. Hydroxychloroquine Counseling:  I discussed with the patient that a baseline ophthalmologic exam is needed at the start of therapy and every year thereafter while on therapy. A CBC may also be warranted for monitoring.  The side effects of this medication were discussed with the patient, including but not limited to agranulocytosis, aplastic anemia, seizures, rashes, retinopathy, and liver toxicity. Patient instructed to call the office should any adverse effect occur.  The patient verbalized understanding of the proper use and possible adverse effects of Plaquenil.  All the patient's questions and concerns were addressed. Ketoconazole Pregnancy And Lactation Text: This medication is Pregnancy Category C and it isn't know if it is safe during pregnancy. It is also excreted in breast milk and breast feeding isn't recommended. Use Enhanced Medication Counseling?: No Zyclara Counseling:  I discussed with the patient the risks of imiquimod including but not limited to erythema, scaling, itching, weeping, crusting, and pain.  Patient understands that the inflammatory response to imiquimod is variable from person to person and was educated regarded proper titration schedule.  If flu-like symptoms develop, patient knows to discontinue the medication and contact us. Hydroquinone Counseling:  Patient advised that medication may result in skin irritation, lightening (hypopigmentation), dryness, and burning.  In the event of skin irritation, the patient was advised to reduce the amount of the drug applied or use it less frequently.  Rarely, spots that are treated with hydroquinone can become darker (pseudoochronosis).  Should this occur, patient instructed to stop medication and call the office. The patient verbalized understanding of the proper use and possible adverse effects of hydroquinone.  All of the patient's questions and concerns were addressed. Ivermectin Counseling:  Patient instructed to take medication on an empty stomach with a full glass of water.  Patient informed of potential adverse effects including but not limited to nausea, diarrhea, dizziness, itching, and swelling of the extremities or lymph nodes.  The patient verbalized understanding of the proper use and possible adverse effects of ivermectin.  All of the patient's questions and concerns were addressed. Bexarotene Pregnancy And Lactation Text: This medication is Pregnancy Category X and should not be given to women who are pregnant or may become pregnant. This medication should not be used if you are breast feeding. Metronidazole Pregnancy And Lactation Text: This medication is Pregnancy Category B and considered safe during pregnancy.  It is also excreted in breast milk. Hydroxychloroquine Pregnancy And Lactation Text: This medication has been shown to cause fetal harm but it isn't assigned a Pregnancy Risk Category. There are small amounts excreted in breast milk. Ketoconazole Counseling:   Patient counseled regarding improving absorption with orange juice.  Adverse effects include but are not limited to breast enlargement, headache, diarrhea, nausea, upset stomach, liver function test abnormalities, taste disturbance, and stomach pain.  There is a rare possibility of liver failure that can occur when taking ketoconazole. The patient understands that monitoring of LFTs may be required, especially at baseline. The patient verbalized understanding of the proper use and possible adverse effects of ketoconazole.  All of the patient's questions and concerns were addressed. Xolair Pregnancy And Lactation Text: This medication is Pregnancy Category B and is considered safe during pregnancy. This medication is excreted in breast milk. Bexarotene Counseling:  I discussed with the patient the risks of bexarotene including but not limited to hair loss, dry lips/skin/eyes, liver abnormalities, hyperlipidemia, pancreatitis, depression/suicidal ideation, photosensitivity, drug rash/allergic reactions, hypothyroidism, anemia, leukopenia, infection, cataracts, and teratogenicity.  Patient understands that they will need regular blood tests to check lipid profile, liver function tests, white blood cell count, thyroid function tests and pregnancy test if applicable. Metronidazole Counseling:  I discussed with the patient the risks of metronidazole including but not limited to seizures, nausea/vomiting, a metallic taste in the mouth, nausea/vomiting and severe allergy. Infliximab Counseling:  I discussed with the patient the risks of infliximab including but not limited to myelosuppression, immunosuppression, autoimmune hepatitis, demyelinating diseases, lymphoma, and serious infections.  The patient understands that monitoring is required including a PPD at baseline and must alert us or the primary physician if symptoms of infection or other concerning signs are noted. Valtrex Pregnancy And Lactation Text: this medication is Pregnancy Category B and is considered safe during pregnancy. This medication is not directly found in breast milk but it's metabolite acyclovir is present. Nsaids Counseling: NSAID Counseling: I discussed with the patient that NSAIDs should be taken with food. Prolonged use of NSAIDs can result in the development of stomach ulcers.  Patient advised to stop taking NSAIDs if abdominal pain occurs.  The patient verbalized understanding of the proper use and possible adverse effects of NSAIDs.  All of the patient's questions and concerns were addressed. Xolair Counseling:  Patient informed of potential adverse effects including but not limited to fever, muscle aches, rash and allergic reactions.  The patient verbalized understanding of the proper use and possible adverse effects of Xolair.  All of the patient's questions and concerns were addressed. Eucrisa Counseling: Patient may experience a mild burning sensation during topical application. Eucrisa is not approved in children less than 2 years of age. Acitretin Pregnancy And Lactation Text: This medication is Pregnancy Category X and should not be given to women who are pregnant or may become pregnant in the future. This medication is excreted in breast milk. Erythromycin Pregnancy And Lactation Text: This medication is Pregnancy Category B and is considered safe during pregnancy. It is also excreted in breast milk. Albendazole Counseling:  I discussed with the patient the risks of albendazole including but not limited to cytopenia, kidney damage, nausea/vomiting and severe allergy.  The patient understands that this medication is being used in an off-label manner. Itraconazole Counseling:  I discussed with the patient the risks of itraconazole including but not limited to liver damage, nausea/vomiting, neuropathy, and severe allergy.  The patient understands that this medication is best absorbed when taken with acidic beverages such as non-diet cola or ginger ale.  The patient understands that monitoring is required including baseline LFTs and repeat LFTs at intervals.  The patient understands that they are to contact us or the primary physician if concerning signs are noted. Xelbongz Pregnancy And Lactation Text: This medication is Pregnancy Category D and is not considered safe during pregnancy.  The risk during breast feeding is also uncertain. Valtrex Counseling: I discussed with the patient the risks of valacyclovir including but not limited to kidney damage, nausea, vomiting and severe allergy.  The patient understands that if the infection seems to be worsening or is not improving, they are to call. Nsaids Pregnancy And Lactation Text: These medications are considered safe up to 30 weeks gestation. It is excreted in breast milk. Acitretin Counseling:  I discussed with the patient the risks of acitretin including but not limited to hair loss, dry lips/skin/eyes, liver damage, hyperlipidemia, depression/suicidal ideation, photosensitivity.  Serious rare side effects can include but are not limited to pancreatitis, pseudotumor cerebri, bony changes, clot formation/stroke/heart attack.  Patient understands that alcohol is contraindicated since it can result in liver toxicity and significantly prolong the elimination of the drug by many years. Arava Counseling:  Patient counseled regarding adverse effects of Arava including but not limited to nausea, vomiting, abnormalities in liver function tests. Patients may develop mouth sores, rash, diarrhea, and abnormalities in blood counts. The patient understands that monitoring is required including LFTs and blood counts.  There is a rare possibility of scarring of the liver and lung problems that can occur when taking methotrexate. Persistent nausea, loss of appetite, pale stools, dark urine, cough, and shortness of breath should be reported immediately. Patient advised to discontinue Arava treatment and consult with a physician prior to attempting conception. The patient will have to undergo a treatment to eliminate Arava from the body prior to conception. Griseofulvin Pregnancy And Lactation Text: This medication is Pregnancy Category X and is known to cause serious birth defects. It is unknown if this medication is excreted in breast milk but breast feeding should be avoided. Erythromycin Counseling:  I discussed with the patient the risks of erythromycin including but not limited to GI upset, allergic reaction, drug rash, diarrhea, increase in liver enzymes, and yeast infections. Picato Counseling:  I discussed with the patient the risks of Picato including but not limited to erythema, scaling, itching, weeping, crusting, and pain. Ilumya Counseling: I discussed with the patient the risks of tildrakizumab including but not limited to immunosuppression, malignancy, posterior leukoencephalopathy syndrome, and serious infections.  The patient understands that monitoring is required including a PPD at baseline and must alert us or the primary physician if symptoms of infection or other concerning signs are noted. Odomzo Counseling- I discussed with the patient the risks of Odomzo including but not limited to nausea, vomiting, diarrhea, constipation, weight loss, changes in the sense of taste, decreased appetite, muscle spasms, and hair loss.  The patient verbalized understanding of the proper use and possible adverse effects of Odomzo.  All of the patient's questions and concerns were addressed. Elidel Counseling: Patient may experience a mild burning sensation during topical application. Elidel is not approved in children less than 2 years of age. There have been case reports of hematologic and skin malignancies in patients using topical calcineurin inhibitors although causality is questionable. Xeljanz Counseling: I discussed with the patient the risks of Xeljanz therapy including increased risk of infection, liver issues, headache, diarrhea, or cold symptoms. Live vaccines should be avoided. They were instructed to call if they have any problems. Hydroxyzine Pregnancy And Lactation Text: This medication is not safe during pregnancy and should not be taken. It is also excreted in breast milk and breast feeding isn't recommended. Thalidomide Counseling: I discussed with the patient the risks of thalidomide including but not limited to birth defects, anxiety, weakness, chest pain, dizziness, cough and severe allergy. Doxycycline Pregnancy And Lactation Text: This medication is Pregnancy Category D and not consider safe during pregnancy. It is also excreted in breast milk but is considered safe for shorter treatment courses. Griseofulvin Counseling:  I discussed with the patient the risks of griseofulvin including but not limited to photosensitivity, cytopenia, liver damage, nausea/vomiting and severe allergy.  The patient understands that this medication is best absorbed when taken with a fatty meal (e.g., ice cream or french fries). Clofazimine Counseling:  I discussed with the patient the risks of clofazimine including but not limited to skin and eye pigmentation, liver damage, nausea/vomiting, gastrointestinal bleeding and allergy. Hydroxyzine Counseling: Patient advised that the medication is sedating and not to drive a car after taking this medication.  Patient informed of potential adverse effects including but not limited to dry mouth, urinary retention, and blurry vision.  The patient verbalized understanding of the proper use and possible adverse effects of hydroxyzine.  All of the patient's questions and concerns were addressed. Sski Pregnancy And Lactation Text: This medication is Pregnancy Category D and isn't considered safe during pregnancy. It is excreted in breast milk. Drysol Pregnancy And Lactation Text: This medication is considered safe during pregnancy and breast feeding. Doxycycline Counseling:  Patient counseled regarding possible photosensitivity and increased risk for sunburn.  Patient instructed to avoid sunlight, if possible.  When exposed to sunlight, patients should wear protective clothing, sunglasses, and sunscreen.  The patient was instructed to call the office immediately if the following severe adverse effects occur:  hearing changes, easy bruising/bleeding, severe headache, or vision changes.  The patient verbalized understanding of the proper use and possible adverse effects of doxycycline.  All of the patient's questions and concerns were addressed. Humira Counseling:  I discussed with the patient the risks of adalimumab including but not limited to myelosuppression, immunosuppression, autoimmune hepatitis, demyelinating diseases, lymphoma, and serious infections.  The patient understands that monitoring is required including a PPD at baseline and must alert us or the primary physician if symptoms of infection or other concerning signs are noted. Otezla Counseling: The side effects of Otezla were discussed with the patient, including but not limited to worsening or new depression, weight loss, diarrhea, nausea, upper respiratory tract infection, and headache. Patient instructed to call the office should any adverse effect occur.  The patient verbalized understanding of the proper use and possible adverse effects of Otezla.  All the patient's questions and concerns were addressed. Tremfya Counseling: I discussed with the patient the risks of guselkumab including but not limited to immunosuppression, serious infections, and drug reactions.  The patient understands that monitoring is required including a PPD at baseline and must alert us or the primary physician if symptoms of infection or other concerning signs are noted. Protopic Counseling: Patient may experience a mild burning sensation during topical application. Protopic is not approved in children less than 2 years of age. There have been case reports of hematologic and skin malignancies in patients using topical calcineurin inhibitors although causality is questionable. Drysol Counseling:  I discussed with the patient the risks of drysol/aluminum chloride including but not limited to skin rash, itching, irritation, burning. Doxepin Pregnancy And Lactation Text: This medication is Pregnancy Category C and it isn't known if it is safe during pregnancy. It is also excreted in breast milk and breast feeding isn't recommended. Clindamycin Pregnancy And Lactation Text: This medication can be used in pregnancy if certain situations. Clindamycin is also present in breast milk. SSKI Counseling:  I discussed with the patient the risks of SSKI including but not limited to thyroid abnormalities, metallic taste, GI upset, fever, headache, acne, arthralgias, paraesthesias, lymphadenopathy, easy bleeding, arrhythmias, and allergic reaction. Fluconazole Counseling:  Patient counseled regarding adverse effects of fluconazole including but not limited to headache, diarrhea, nausea, upset stomach, liver function test abnormalities, taste disturbance, and stomach pain.  There is a rare possibility of liver failure that can occur when taking fluconazole.  The patient understands that monitoring of LFTs and kidney function test may be required, especially at baseline. The patient verbalized understanding of the proper use and possible adverse effects of fluconazole.  All of the patient's questions and concerns were addressed. Protopic Pregnancy And Lactation Text: This medication is Pregnancy Category C. It is unknown if this medication is excreted in breast milk when applied topically. Otezla Pregnancy And Lactation Text: This medication is Pregnancy Category C and it isn't known if it is safe during pregnancy. It is unknown if it is excreted in breast milk. Detail Level: Detailed Prednisone Counseling:  I discussed with the patient the risks of prolonged use of prednisone including but not limited to weight gain, insomnia, osteoporosis, mood changes, diabetes, susceptibility to infection, glaucoma and high blood pressure.  In cases where prednisone use is prolonged, patients should be monitored with blood pressure checks, serum glucose levels and an eye exam.  Additionally, the patient may need to be placed on GI prophylaxis, PCP prophylaxis, and calcium and vitamin D supplementation and/or a bisphosphonate.  The patient verbalized understanding of the proper use and the possible adverse effects of prednisone.  All of the patient's questions and concerns were addressed. Clindamycin Counseling: I counseled the patient regarding use of clindamycin as an antibiotic for prophylactic and/or therapeutic purposes. Clindamycin is active against numerous classes of bacteria, including skin bacteria. Side effects may include nausea, diarrhea, gastrointestinal upset, rash, hives, yeast infections, and in rare cases, colitis. Enbrel Counseling:  I discussed with the patient the risks of etanercept including but not limited to myelosuppression, immunosuppression, autoimmune hepatitis, demyelinating diseases, lymphoma, and infections.  The patient understands that monitoring is required including a PPD at baseline and must alert us or the primary physician if symptoms of infection or other concerning signs are noted. Colchicine Counseling:  Patient counseled regarding adverse effects including but not limited to stomach upset (nausea, vomiting, stomach pain, or diarrhea).  Patient instructed to limit alcohol consumption while taking this medication.  Colchicine may reduce blood counts especially with prolonged use.  The patient understands that monitoring of kidney function and blood counts may be required, especially at baseline. The patient verbalized understanding of the proper use and possible adverse effects of colchicine.  All of the patient's questions and concerns were addressed. Spironolactone Pregnancy And Lactation Text: This medication can cause feminization of the male fetus and should be avoided during pregnancy. The active metabolite is also found in breast milk. Taltz Counseling: I discussed with the patient the risks of ixekizumab including but not limited to immunosuppression, serious infections, worsening of inflammatory bowel disease and drug reactions.  The patient understands that monitoring is required including a PPD at baseline and must alert us or the primary physician if symptoms of infection or other concerning signs are noted. Solaraze Counseling:  I discussed with the patient the risks of Solaraze including but not limited to erythema, scaling, itching, weeping, crusting, and pain.

## 2024-03-21 NOTE — HISTORY OF PRESENT ILLNESS
[FreeTextEntry1] : Ms. ANDREW AGULIAR, 53 year old female, never smoker, w/ hx of HLD, pertussis, GERD, recent diagnosis of SVT. She is status post Right VATS, robotic assisted, tracheobronchoplasty with Prolene mesh on 3/12/18 by Dr. Goodson. She was initially referred by Dr. Summers.  CT CHEST on 8/18/2021: - No consolidations, edema, effusion or pneumothorax - On expiratory imaging there is near complete collapse of the posterior membrane of the trachea and bilateral mainstem bronchi, findings consistent with tracheobronchomalacia  She is s/p Awake/Dynamic Bronch on 9/17/2021 by Dr. Nathaniel Goodson and Dr. Vega Stovall. Finding revealed excessive dynamic airway collapse of appx 50% at distal trachea and it did not appear to be obstructive or limiting.   CT Chest Dynamic on 2/28/23: - Marked collapse of the trachea is noted on the dynamic expiratory images, findings suggestive of tracheomalacia. - a 0.2 cm nodule in the anterior segment of the RLL is unchanged when compared to previous exam.   CT Chest Dynamic on 2/19/24: - approximately 65% collapse of the trachea on dynamic expiration (measures 1.7 cm in inspiration, 6 mm in expiration in transverse dimension), suggestive of tracheomalacia. - There is approximately 40% collapse of the right mainstem bronchus on expiration (1 cm in inspiration, 6 mm in expiration). There is approximately 30% collapse of the left mainstem bronchus on expiration (1 cm in inspiration, 7 mm in expiration).  - No pleural effusion. Stable scattered sub-4 mm pulmonary nodules, for example in the right lower lobe on series 3, images 70, 74 and 82. The lungs are otherwise clear. - No thoracic adenopathy.  Patient is here today for a follow up. Overall feels well. Reports some progressive shortness of breath upon exertion, but has not been affecting her activities of daily living. She describes the sensation as if a "pillow is over my face." Has been experiencing episodes of tachycardia. Currently following with cardiology.

## 2024-03-21 NOTE — ASSESSMENT
[FreeTextEntry1] : Ms. ANDREW AGUILAR, 53 year old female, never smoker, w/ hx of HLD, pertussis, GERD, recent diagnosis of SVT. She is status post Right VATS, robotic assisted, tracheobronchoplasty with Prolene mesh on 3/12/18 by Dr. Goodson. She was initially referred by Dr. Summers.  She is s/p Awake/Dynamic Bronch on 9/17/2021 by Dr. Nathaniel Goodson and Dr. Vega Stovall. Finding revealed excessive dynamic airway collapse of appx 50% at distal trachea and it did not appear to be obstructive or limiting.   CT Chest Dynamic on 2/19/24: - approximately 65% collapse of the trachea on dynamic expiration (measures 1.7 cm in inspiration, 6 mm in expiration in transverse dimension), suggestive of tracheomalacia. - There is approximately 40% collapse of the right mainstem bronchus on expiration (1 cm in inspiration, 6 mm in expiration). There is approximately 30% collapse of the left mainstem bronchus on expiration (1 cm in inspiration, 7 mm in expiration).  - No pleural effusion. Stable scattered sub-4 mm pulmonary nodules, for example in the right lower lobe on series 3, images 70, 74 and 82. The lungs are otherwise clear. - No thoracic adenopathy.  I have reviewed the patient's medical records and diagnostic images at time of this office consultation and have made the following recommendation: 1. Patient with worsening shortness of breath. CT Chest demonstrating 65% collapse of the trachea on dynamic expiration. Recommend an Awake bronchoscopy for further evaluation. Risks/benefits/alternatives reviewed. She is agreeable to proceed. Will book for April 18. Patient experiencing tachycardia. Cardiac clearance required prior to procedure.   Recommendations reviewed with patient during this office visit, and all questions answered; Patient instructed on the importance of follow up and verbalizes understanding.  I, NATHAN Rivas, personally performed the evaluation and management (E/M) services for this established patient. That E/M includes conducting the examination, assessing all new/exacerbated conditions, and establishing a new plan of care. Today, My ACP, Maddie Emery, was here to observe my evaluation and management services for this patient to be followed going forward.

## 2024-03-21 NOTE — CONSULT LETTER
[FreeTextEntry2] : Jack Summers MD (Pulm) [FreeTextEntry3] : Hans Segundo MD, MPH \par  System Director of Thoracic Surgery \par  Director of Comprehensive Lung and Foregut [place] \par  Professor Cardiovascular & Thoracic Surgery  \par  [employer] [facility]\par  \par  [facility]\par  [address]\par  Oncology [address]\[place] [address]\par  Tel: [phone]\par  Fax: [phone]\par

## 2024-03-25 NOTE — PHYSICAL EXAM
[Well Developed] : well developed [Well Nourished] : well nourished [No Acute Distress] : no acute distress [Normal Conjunctiva] : normal conjunctiva [Normal Venous Pressure] : normal venous pressure [No Carotid Bruit] : no carotid bruit [Normal S1, S2] : normal S1, S2 [No Rub] : no rub [No Murmur] : no murmur [Normal] : clear lung fields, good air entry, no respiratory distress [No Gallop] : no gallop [Clear Lung Fields] : clear lung fields [Good Air Entry] : good air entry [Soft] : abdomen soft [No Respiratory Distress] : no respiratory distress  [No Masses/organomegaly] : no masses/organomegaly [Non Tender] : non-tender [Normal Bowel Sounds] : normal bowel sounds [No Cyanosis] : no cyanosis [No Edema] : no edema [Normal Gait] : normal gait [No Clubbing] : no clubbing [No Varicosities] : no varicosities [No Rash] : no rash [Moves all extremities] : moves all extremities [No Skin Lesions] : no skin lesions [Normal Speech] : normal speech [No Focal Deficits] : no focal deficits [Normal memory] : normal memory [Alert and Oriented] : alert and oriented

## 2024-03-26 ENCOUNTER — APPOINTMENT (OUTPATIENT)
Dept: ELECTROPHYSIOLOGY | Facility: CLINIC | Age: 54
End: 2024-03-26
Payer: COMMERCIAL

## 2024-03-26 ENCOUNTER — NON-APPOINTMENT (OUTPATIENT)
Age: 54
End: 2024-03-26

## 2024-03-26 VITALS — SYSTOLIC BLOOD PRESSURE: 113 MMHG | HEART RATE: 100 BPM | OXYGEN SATURATION: 100 % | DIASTOLIC BLOOD PRESSURE: 77 MMHG

## 2024-03-26 DIAGNOSIS — R00.2 PALPITATIONS: ICD-10-CM

## 2024-03-26 PROCEDURE — 93000 ELECTROCARDIOGRAM COMPLETE: CPT

## 2024-03-26 PROCEDURE — 99215 OFFICE O/P EST HI 40 MIN: CPT | Mod: 25

## 2024-03-26 RX ORDER — DICLOFENAC SODIUM 1% 10 MG/G
1 GEL TOPICAL 3 TIMES DAILY
Qty: 1 | Refills: 3 | Status: DISCONTINUED | COMMUNITY
Start: 2023-05-26 | End: 2024-03-26

## 2024-03-26 RX ORDER — SEMAGLUTIDE 2.4 MG/.75ML
2.4 INJECTION, SOLUTION SUBCUTANEOUS
Qty: 1 | Refills: 1 | Status: DISCONTINUED | COMMUNITY
Start: 2023-08-15 | End: 2024-03-26

## 2024-03-26 RX ORDER — ONDANSETRON 4 MG/1
4 TABLET, ORALLY DISINTEGRATING ORAL
Qty: 4 | Refills: 0 | Status: DISCONTINUED | COMMUNITY
Start: 2023-08-17 | End: 2024-03-26

## 2024-03-26 RX ORDER — PRUCALOPRIDE 2 MG/1
2 TABLET, FILM COATED ORAL
Qty: 30 | Refills: 0 | Status: DISCONTINUED | COMMUNITY
Start: 2023-07-31 | End: 2024-03-26

## 2024-03-26 RX ORDER — POLYETHYLENE GLYCOL 3350 AND ELECTROLYTES WITH LEMON FLAVOR 236; 22.74; 6.74; 5.86; 2.97 G/4L; G/4L; G/4L; G/4L; G/4L
236 POWDER, FOR SOLUTION ORAL
Qty: 1 | Refills: 0 | Status: DISCONTINUED | COMMUNITY
Start: 2023-07-31 | End: 2024-03-26

## 2024-03-29 ENCOUNTER — APPOINTMENT (OUTPATIENT)
Dept: ORTHOPEDIC SURGERY | Facility: CLINIC | Age: 54
End: 2024-03-29
Payer: COMMERCIAL

## 2024-03-29 DIAGNOSIS — M51.26 OTHER INTERVERTEBRAL DISC DISPLACEMENT, LUMBAR REGION: ICD-10-CM

## 2024-03-29 DIAGNOSIS — M54.16 RADICULOPATHY, LUMBAR REGION: ICD-10-CM

## 2024-03-29 PROCEDURE — 99215 OFFICE O/P EST HI 40 MIN: CPT

## 2024-03-29 NOTE — DISCUSSION/SUMMARY
[Surgical risks reviewed] : Surgical risks reviewed [Medication Risks Reviewed] : Medication risks reviewed [de-identified] : reviewed the case and the imaging of the neck and the lower back   the neck a few years out - looks like the acdf is healed - there is significant degenerative changes below the fusion and that is likely causing some of the pain  we discussed could consider further imaging and possibly even consider further surgery which would likely be extension of the fusion down to the C7  Has pain in the right leg - hard to say knee versus back   went over the mRI - no obvious target for a surgical intervention   REFERRAL TO Dr peña - rec she try an injectoin as a diagnostic test - if it provides relief consider lumbar decompression If she did have a response possibly consider right sided L3-4 and L4-5 decompression - we discussed this today what it would be   SCS maybe if no response on the injection

## 2024-03-29 NOTE — HISTORY OF PRESENT ILLNESS
[Neck] : neck [Lower back] : lower back [Gradual] : gradual [7] : 7 [Dull/Aching] : dull/aching [Localized] : localized [Sharp] : sharp [Constant] : constant [Household chores] : household chores [Leisure] : leisure [Sleep] : sleep [Meds] : meds [Heat] : heat [Nothing helps with pain getting better] : Nothing helps with pain getting better [Lying in bed] : lying in bed [Part time] : Work status: part time [de-identified] : 7/28/21: Here for f/u. Plan at last "Will eventually seek surgery for left shoulder. As far as neck has somewhat mild degenerative changes - no specific treatment indicated for this - suspect the shoulder the main issue based on current presentation of symptoms." She states she was initially doing well after surgery, but has since gotten increased pain after the first couple of sessions of PT. She states she has slowly improved, but the neck has been more bothersome lately.   Had surgery with Dr Felder and was referred here for her neck   had an episode with steroids which weren't helped  8/11/21: Here for MRI review. Plan at last "recently had left shoulder surgery and has pain in the left shoulder - referred for updated neck eval - indicated for updated MRI of the C spine for eval" - pain in the neck and to the left shoulder   MRI C-spine 8/4/21: 1. C3-4: Left foraminal disc herniation causing moderate left foraminal stenosis. Dorsal annular fissure. No change. 2. C4-5: Broad-based central disc herniation impinging the ventral spinal cord. Dorsal annular fissure. Disc space narrowing. No change. 3. C6-7: Disc bulge abuts the ventral spinal cord. The bulge narrows the neuroforamina. Disc space narrowing and chronic vertebral endplate changes. No change. 4. C7-T1: Central disc herniation indents the thecal sac. No change.  3/12/22: plan last visit - "rec PT - reviewed the updated MRi - no change - lidocaine patch - no good surgical target" - here back for the neck - has had injections with Dr Sanches and has seen neurologic - no relief - neck pain and into the left shoulder and into the left arm remains - it is not responding to any treatment - pain limits her activity   saw neurology and had further tests done and was told everything was coming from the neck  5/25/22: Here for follow-up; plan last visit was, " reviewed the case with her - persistent pain in the neck and the left side of the shoulder and into the left arm - reviewed tHE mri AND THERE IS neurologic narrowing in a correlating fashion - has tried extensive conservative tx and seen multiple providers who have been unable to provide relief and also have indicated that they feel her neck is the source of her pain - in light of all this she is indicated for. acdf C3-6. discussion of the surgery - the r/b/e discussed at length. she is well informed and would like to proceed  here for pre op   neck pain and into the left shoulder remains  lwoer back pain and into the rigth thight  has new mris which we reviewed today of the C and L spine   MRi C spine - left sided NF stenosis C3-6 MRI L spine - mild stenosis L3-4 and L4-5   6/21/22: Here for fu post op - wound has been dry - has been suffering with constipation/nausua - not really able to take pain medications due to the side effects - wearing collar - swallowing slowly improving  xrays today: C spine - implants in good position  7/29/22- Here for fu post op. 8 weeks s/p  acdf C3-6 performed 06/02/22. having some neck pain - swallowing better   xrays today: C spine - healing well   8/22/22: Here for fu - plan at last was "post op care discussed  bone stim fu 4 weeks  no heavy lifting OOW at this point" - overall doing a bit better - pain in the left lateral upper arm   using advil and celebrex at times  bone stim wasn't approved   xrays today:  C spine - healing acdf   12/19/22: Here for - now about 6 months s/p acdf - having more pain in the lower part of the neck - she cant really it go away with certain positions - sides of the neck painful   working   xrays today: C spine - healed up   12/26/22: here for fu - plan at last was "post op care discussed  working  has some celebrex  MRI C spine indicated for worsening pain" - overall doing about the same as last visit   mri C spine - Postoperative study following C3-4 and C4-5 discectomy and fusion without spinal cord or nerve root  compression at the operated level. Small disc herniation at C2-3 without spinal cord or nerve root compression unchanged from the most recent  prior MRI. Mild degenerative disc disease at C5-6 without progression from the prior MRI. Moderate degenerative disc disease with a disc herniation and small endplate osteophytes at C6-7 without spinal  cord compression, nerve root compression or change from the most recent prior MRI. Small disc herniation at C7-T1 without spinal cord or nerve root compression or change from the most recent  prior MRI. Mild left-sided facet osteoarthrosis at C3-4 and C4-5 without progression from the prior MRI. The bone marrow edema seen in the left articular process of C3 on the most recent prior MRI has resolved.  3/9/24: Here for eval of the right knee/pain - Had knee replacement with Dr Read - pain in the right leg - left leg is okay - has numbness in the feet   Now with bladder leakage - she saw urology and was told it was okay  had bladder sling years ago and was told it was okay   Having persistent knee pain and swelling   xrays today: C spine - healed acdf C3-5  L spine - mild spondylosis  AP PELVIS - negative   3/29/24: Here for fu - plan at last was "lower back painful and with symptoms in the right leg the xrays look okay  indicated for mri L spine   rec conversative tx in the meantime" - overall the right leg is bothering her quite a bit - mostly around the right knee -   using tylenol at times   MRI l SPINE [facility] - L4-5: Disc bulge and bilateral hypertrophic facet joint changes. Hypertrophic change is seen involving both ligamentum flavum. Mild to moderate narrowing of the spinal canal is seen. Mild to moderate narrowing of the right neural foramen L5-S1: Disc bulge and bilateral hypertrophic facet joint changes. Mild narrowing of the spinal canal. Mild to moderate narrowing of the left neural foramen. Moderate to severe narrowing of the right neural foramen  [] : no [FreeTextEntry7] : down the RT Leg [FreeTextEntry5] : MRI REVIEW L SPINE [FreeTextEntry9] : 800 mg Motrin [de-identified] : turning neck

## 2024-04-01 DIAGNOSIS — D64.9 ANEMIA, UNSPECIFIED: ICD-10-CM

## 2024-04-03 NOTE — ASU PATIENT PROFILE, ADULT - NS SC CAGE ALCOHOL CUT DOWN
----- Message from Earnestine Turner sent at 4/3/2024 10:08 AM CDT -----  Pt called said she needed some Hemorid cream called in pt go with [facility] in [facility] PH# [phone] she wanted to speak with someone in the office she can be reached at [phone]     no

## 2024-04-04 PROCEDURE — 93244 EXT ECG>48HR<7D REV&INTERPJ: CPT

## 2024-04-05 NOTE — DISCUSSION/SUMMARY
[FreeTextEntry1] : 53 year old woman with palpitations.  Recent outpatient monitoring has demonstrated symptoms to correlate to supraventricular ectopy.  That being said the initial Zio from 2021 showed that some symptom triggers correlated to sinus rhythm.  She was doing well on flecainide and propranolol.  On physical exam she did have some APCs.  I suggested a Zio to quantify burden of ectopy.  Perhaps we may consider mapping and ablation.  That is, i explained to her that successful ablation would depend on having arrhythmia to map.  Furthermore, with prior symptoms correlating to sinus rhythm, she will still likely have symptoms (unless Zio shows more symptoms related to ectopy).  For now we will increase her flecainide.   4/5/2024 addendum Zio with low burden of SVE. No further EPS evaluation intervention required prior to her planned bronchoscopy. 
reactions to medicines

## 2024-04-05 NOTE — HISTORY OF PRESENT ILLNESS
[FreeTextEntry1] : 53 Year Old Female - D - FHX of MI 52 yrs in Father's Side - Hypothyroidism, Dysphagia, Poor esophageal motility, S/p tracheo- bronchoplasty (2018) with baseline SOB, Obesity, Mild ASCVD (On Chest CT), Bouts of SVT, Statin Intolerance to Crestor/Atorvastatin (Baseline LDLc in 190s) currently on On Ozempic And Praluent.   She had been doing well from a palpitations standpoint until about one month ago. She notes "fast heart beats" all day, clarification... it is intermittent all day.  She continues to walk her 6 miles/day.  No lightheadedness/dizziness.    She is awaiting bronchoscopy (follow up for bronchoplasty).  She does have some sensation of breathing through a mask.

## 2024-04-05 NOTE — CARDIOLOGY SUMMARY
[de-identified] : today: Sinus rhythm [de-identified] : 12/8/2022\par  30 runs of SVT (ABEBA) [de-identified] : 8/8/2022\par  9 minutes of exercise (10 METS)\par  No arrhythmia\par  Negative ECG response [de-identified] : 9/23/2021\par  Normal LV systolic function\par  No LA dilation\par  Mild LVH\par

## 2024-04-14 ENCOUNTER — RX RENEWAL (OUTPATIENT)
Age: 54
End: 2024-04-14

## 2024-04-14 RX ORDER — CYCLOBENZAPRINE HYDROCHLORIDE 10 MG/1
10 TABLET, FILM COATED ORAL
Qty: 90 | Refills: 0 | Status: ACTIVE | COMMUNITY
Start: 2022-06-26 | End: 1900-01-01

## 2024-04-15 ENCOUNTER — RX RENEWAL (OUTPATIENT)
Age: 54
End: 2024-04-15

## 2024-04-15 RX ORDER — PROPRANOLOL HYDROCHLORIDE 10 MG/1
10 TABLET ORAL
Qty: 180 | Refills: 1 | Status: ACTIVE | COMMUNITY
Start: 2023-02-21 | End: 1900-01-01

## 2024-04-15 NOTE — BRIEF OPERATIVE NOTE - OPERATION/FINDINGS
PDMP Monitoring:    Last PDMP Greg as Reviewed ([place]):  Review User Review Instant Review Result   SUKHWINDER SLADE 3/11/2024  4:55 PM Reviewed PDMP [1]     Urine Drug Screenings (1 yr)       POCT Rapid Drug Screen  Collected: 8/9/2023  2:08 PM (Final result)                  Medication Contract and Consent for Opioid Use Documents Filed        No documents found                     Partially awake diagnostic bronchoscopy. Patient had 75% of distal trachea collapse on expiration. >90% left main, 75% R main on expiration.

## 2024-04-16 ENCOUNTER — OUTPATIENT (OUTPATIENT)
Dept: OUTPATIENT SERVICES | Facility: HOSPITAL | Age: 54
LOS: 1 days | End: 2024-04-16

## 2024-04-16 VITALS
SYSTOLIC BLOOD PRESSURE: 113 MMHG | DIASTOLIC BLOOD PRESSURE: 81 MMHG | HEIGHT: 64 IN | TEMPERATURE: 98 F | RESPIRATION RATE: 14 BRPM | WEIGHT: 125 LBS | OXYGEN SATURATION: 98 % | HEART RATE: 89 BPM

## 2024-04-16 DIAGNOSIS — Z98.890 OTHER SPECIFIED POSTPROCEDURAL STATES: Chronic | ICD-10-CM

## 2024-04-16 DIAGNOSIS — Z98.49 CATARACT EXTRACTION STATUS, UNSPECIFIED EYE: Chronic | ICD-10-CM

## 2024-04-16 DIAGNOSIS — J39.8 OTHER SPECIFIED DISEASES OF UPPER RESPIRATORY TRACT: ICD-10-CM

## 2024-04-16 DIAGNOSIS — Z86.018 PERSONAL HISTORY OF OTHER BENIGN NEOPLASM: Chronic | ICD-10-CM

## 2024-04-16 DIAGNOSIS — Z98.51 TUBAL LIGATION STATUS: Chronic | ICD-10-CM

## 2024-04-16 DIAGNOSIS — Z90.49 ACQUIRED ABSENCE OF OTHER SPECIFIED PARTS OF DIGESTIVE TRACT: Chronic | ICD-10-CM

## 2024-04-16 DIAGNOSIS — Z96.651 PRESENCE OF RIGHT ARTIFICIAL KNEE JOINT: Chronic | ICD-10-CM

## 2024-04-16 DIAGNOSIS — Z98.1 ARTHRODESIS STATUS: Chronic | ICD-10-CM

## 2024-04-16 RX ORDER — SEMAGLUTIDE 0.68 MG/ML
0 INJECTION, SOLUTION SUBCUTANEOUS
Qty: 0 | Refills: 0 | DISCHARGE

## 2024-04-16 RX ORDER — SODIUM CHLORIDE 9 MG/ML
1000 INJECTION, SOLUTION INTRAVENOUS
Refills: 0 | Status: DISCONTINUED | OUTPATIENT
Start: 2024-04-18 | End: 2024-05-02

## 2024-04-16 NOTE — H&P PST ADULT - NEUROLOGICAL
sensation intact/responds to pain/responds to verbal commands negative cranial nerves II-XII intact/sensation intact/responds to pain/responds to verbal commands

## 2024-04-16 NOTE — H&P PST ADULT - PROBLEM SELECTOR PLAN 1
Pt scheduled for awake bronchoscopy on 4/18/2024.  labs cbc, cmp done 3/4/2024 results in chart, ekg in chart Preop teaching done, pt able to verbalize understanding.   medication day of procedure-  flecainide, propranolol, levothyroxine.  pst request   echo 2023 in chart  stress test 2022 in chart   CTA 2023 in chart

## 2024-04-16 NOTE — H&P PST ADULT - ENMT COMMENTS
h/o tracheomalacia, s/p tracheobronchial plasty  2018 full rom of neck mal 4 h/o tracheomalacia, s/p tracheobronchial plasty  2018, denies dentures and loose teeth

## 2024-04-16 NOTE — H&P PST ADULT - NSICDXPASTSURGICALHX_GEN_ALL_CORE_FT
PAST SURGICAL HISTORY:  H/O arthroscopy of right knee     H/O bilateral breast reduction surgery     H/O parotidectomy     H/O shoulder surgery left    H/O tubal ligation bilateral    History of abdominoplasty 2012, mini    History of bladder surgery sling    History of carpal tunnel release left    History of cataract surgery bilateral    History of lipoma removed from left shoulder    History of parotidectomy left    S/P cervical spinal fusion     S/P tracheoplasty 2018 can use size 7 ET tube "I have been told I am totally fine now"    Status post arthroscopy of hip left    Status post right breast lumpectomy 1996     PAST SURGICAL HISTORY:  H/O arthroscopy of right knee     H/O bilateral breast reduction surgery     H/O parotidectomy     H/O shoulder surgery left    H/O tubal ligation bilateral    History of abdominoplasty 2012, mini    History of bladder surgery sling    History of carpal tunnel release left    History of cataract surgery bilateral    History of lipoma removed from left shoulder    S/P cervical spinal fusion     S/P total knee replacement, right     S/P tracheoplasty 2018 can use size 7 ET tube "I have been told I am totally fine now"    Status post arthroscopy of hip left    Status post right breast lumpectomy 1996

## 2024-04-16 NOTE — H&P PST ADULT - LAST CARDIAC ANGIOGRAM/IMAGING
CTA 2023 CTA 2023 calcium score 0 all scripts CTA 2023 calcium score 0 all scripts, LAD: Eccentric noncalcified plaque within the mid segment resulting in minimal (up to 30%) luminal narrowing.RCA: Dominant vessel, with eccentric noncalcified plaque within the mid segment and possibly within the proximal segment resulting in minimal (less than 30%) luminal narrowing.

## 2024-04-16 NOTE — H&P PST ADULT - RESPIRATORY
clear to auscultation bilaterally/no wheezes/no rales/no rhonchi clear to auscultation bilaterally/no wheezes/no rales/no rhonchi/no respiratory distress/no use of accessory muscles/no subcutaneous emphysema/airway patent/breath sounds equal/good air movement/respirations non-labored/no intercostal retractions

## 2024-04-16 NOTE — H&P PST ADULT - NS PRO FEM REPRO PAP RESULTS
[de-identified] : Lower eyelid \par no scleral show\par snap-back test less than 5 mm\par + dermatochalasis\par + fat herniation\par  normal

## 2024-04-16 NOTE — H&P PST ADULT - RESPIRATORY AND THORAX COMMENTS
has a feeling of suffocation not associated with activity, like something is covering her mouth and not letting the air in

## 2024-04-16 NOTE — H&P PST ADULT - MUSCULOSKELETAL
details… right knee/decreased ROM/decreased ROM due to pain ROM intact/decreased ROM/decreased ROM due to pain/normal gait/strength 5/5 bilateral upper extremities/strength 5/5 bilateral lower extremities

## 2024-04-16 NOTE — H&P PST ADULT - GASTROINTESTINAL
soft/nontender/nondistended/normal active bowel sounds details… soft/nontender/nondistended/normal active bowel sounds/no guarding/no rigidity/no organomegaly/no palpable ashley

## 2024-04-16 NOTE — H&P PST ADULT - CARDIOVASCULAR
regular rate and rhythm/S1 S2 present details… regular rate and rhythm/S1 S2 present/no gallops/no rub/no murmur/no JVD/normal PMI/no pedal edema

## 2024-04-16 NOTE — H&P PST ADULT - HISTORY OF PRESENT ILLNESS
52y/o female scheduled for awake bronchoscopy on 4/18/2024.  Pt states, " 52y/o female scheduled for awake bronchoscopy on 4/18/2024.  Pt states, hx SVT, hld, gerd. hypothyroidism, on mounjaro for weight loss, s/p right VATS tracheobronchoplasty 3/2018, c/o feeling of suffocation not associated with activity, cat scan shows approximately 65% collapse of the trachea on dynamic expiration.  approx 40% collapse of the right mainstem bronchus on expiration, approx 30% collapse of the left mainstem bronchus on expiration,  stable scattered pulmonary nodules.

## 2024-04-16 NOTE — H&P PST ADULT - NSICDXPASTMEDICALHX_GEN_ALL_CORE_FT
PAST MEDICAL HISTORY:  Allergic rhinitis, seasonal     Anxiety     Esophageal dysmotility     GERD (gastroesophageal reflux disease)     History of hyperkalemia     HLD (hyperlipidemia)     Hypothyroidism     IBS (irritable bowel syndrome)     Low serum vitamin D     Osteoarthritis of right knee     SVT (supraventricular tachycardia)      PAST MEDICAL HISTORY:  Allergic rhinitis, seasonal     Anxiety     Esophageal dysmotility     GERD (gastroesophageal reflux disease)     History of hyperkalemia     HLD (hyperlipidemia)     Hypothyroidism     IBS (irritable bowel syndrome)     Low serum vitamin D     Osteoarthritis of right knee     SVT (supraventricular tachycardia)     Tracheomalacia

## 2024-04-17 NOTE — ASU PATIENT PROFILE, ADULT - PRO ARRIVE FROM
I have reviewed and confirmed nurses' notes for patient's medications, allergies, medical history, and surgical history.
home

## 2024-04-18 ENCOUNTER — APPOINTMENT (OUTPATIENT)
Dept: THORACIC SURGERY | Facility: HOSPITAL | Age: 54
End: 2024-04-18

## 2024-04-18 ENCOUNTER — OUTPATIENT (OUTPATIENT)
Dept: OUTPATIENT SERVICES | Facility: HOSPITAL | Age: 54
LOS: 1 days | Discharge: HOME | End: 2024-04-18
Payer: COMMERCIAL

## 2024-04-18 ENCOUNTER — TRANSCRIPTION ENCOUNTER (OUTPATIENT)
Age: 54
End: 2024-04-18

## 2024-04-18 VITALS
DIASTOLIC BLOOD PRESSURE: 77 MMHG | HEIGHT: 64 IN | HEART RATE: 96 BPM | WEIGHT: 125 LBS | TEMPERATURE: 98 F | OXYGEN SATURATION: 99 % | RESPIRATION RATE: 17 BRPM | SYSTOLIC BLOOD PRESSURE: 115 MMHG

## 2024-04-18 VITALS
SYSTOLIC BLOOD PRESSURE: 93 MMHG | HEART RATE: 76 BPM | DIASTOLIC BLOOD PRESSURE: 64 MMHG | OXYGEN SATURATION: 98 % | RESPIRATION RATE: 16 BRPM

## 2024-04-18 DIAGNOSIS — Z98.49 CATARACT EXTRACTION STATUS, UNSPECIFIED EYE: Chronic | ICD-10-CM

## 2024-04-18 DIAGNOSIS — Z98.890 OTHER SPECIFIED POSTPROCEDURAL STATES: Chronic | ICD-10-CM

## 2024-04-18 DIAGNOSIS — Z86.018 PERSONAL HISTORY OF OTHER BENIGN NEOPLASM: Chronic | ICD-10-CM

## 2024-04-18 DIAGNOSIS — J39.8 OTHER SPECIFIED DISEASES OF UPPER RESPIRATORY TRACT: ICD-10-CM

## 2024-04-18 DIAGNOSIS — Z98.51 TUBAL LIGATION STATUS: Chronic | ICD-10-CM

## 2024-04-18 DIAGNOSIS — Z90.49 ACQUIRED ABSENCE OF OTHER SPECIFIED PARTS OF DIGESTIVE TRACT: Chronic | ICD-10-CM

## 2024-04-18 DIAGNOSIS — Z96.651 PRESENCE OF RIGHT ARTIFICIAL KNEE JOINT: Chronic | ICD-10-CM

## 2024-04-18 PROCEDURE — 31622 DX BRONCHOSCOPE/WASH: CPT

## 2024-04-18 RX ORDER — TEMAZEPAM 15 MG/1
1 CAPSULE ORAL
Qty: 0 | Refills: 0 | DISCHARGE

## 2024-04-18 RX ORDER — LEVOTHYROXINE SODIUM 125 MCG
1 TABLET ORAL
Qty: 0 | Refills: 0 | DISCHARGE

## 2024-04-18 RX ORDER — PANTOPRAZOLE SODIUM 20 MG/1
1 TABLET, DELAYED RELEASE ORAL
Qty: 0 | Refills: 0 | DISCHARGE

## 2024-04-18 RX ORDER — PROPRANOLOL HCL 160 MG
1 CAPSULE, EXTENDED RELEASE 24HR ORAL
Refills: 0 | DISCHARGE

## 2024-04-18 RX ORDER — TIRZEPATIDE 15 MG/.5ML
12.5 INJECTION, SOLUTION SUBCUTANEOUS
Refills: 0 | DISCHARGE

## 2024-04-18 RX ORDER — ALIROCUMAB 75 MG/ML
0 INJECTION, SOLUTION SUBCUTANEOUS
Qty: 0 | Refills: 0 | DISCHARGE

## 2024-04-18 RX ORDER — LINACLOTIDE 145 UG/1
1 CAPSULE, GELATIN COATED ORAL
Qty: 0 | Refills: 0 | DISCHARGE

## 2024-04-18 RX ORDER — FLECAINIDE ACETATE 50 MG
1 TABLET ORAL
Refills: 0 | DISCHARGE

## 2024-04-18 RX ORDER — ALPRAZOLAM 0.25 MG
1 TABLET ORAL
Qty: 0 | Refills: 0 | DISCHARGE

## 2024-04-18 NOTE — BRIEF OPERATIVE NOTE - COMMENTS
I, Ugo Pratt PA-C provided direct first assist support to the surgeon during this surgical procedure. My involvement included positioning, prepping and draping the patient prior to surgery, ensuring clear visibility and exposure for the surgeon by using instruments such as retractors and suction, closing surgical incisions and dressing wounds. As well as other tasks as directed by the surgeon.

## 2024-04-18 NOTE — BRIEF OPERATIVE NOTE - ESTIMATED BLOOD LOSS
[facility] [place], [address]  [address] [address] [facility] [id number]  [phone]    Russ Santos :2010 MRN:[id number]    2/3/2022      Begin Time: 3:00    End Time: 3:45    Session Type:Therapy 38-52 minutes (02491)    Others Present: pt's mother    Intervention: Behavioral, Cognitive, Insight, Supportive, Systemic    Suicide/Homicide/Violence Ideation: No    If Yes, explain: na    Current Outpatient Medications   Medication Sig   • dexmethylphenidate (FOCALIN XR) 20 MG 24 hr capsule Take 1 capsule by mouth daily.   • Methylphenidate HCl (QuilliChew ER) 40 MG Tablet Chewable Extended Release Take 1 tablet by mouth every morning.   • Lactobacillus Acid-Pectin (ACIDOPHILUS-PECTIN) capsule Take 1 capsule by mouth daily.   • albuterol 108 (90 Base) MCG/ACT inhaler Inhale 2 puffs into the lungs 4 times daily as needed for Wheezing.   • loratadine (CLARITIN) 10 MG tablet Take 10 mg by mouth daily.     No current facility-administered medications for this visit.       Change in Medication(s) Reported: No  If Yes, explain: na    Patient/Family Education Provided: Yes  Patient/Family Displays Understanding: Yes    If No, explain: na    Chief complaint in patient's own words: \"I've been good.\"    Progress Note containing chief complaint and symptoms/problems related to the complaint:    (Data/Action/Response/Plan)    D: Russ is a 11 year old  male who this writer meets in the clinic with the pt's mother. The pt, his mother, and this writer are all wearing masks and are socially distanced during the session. The pt reports that he is \"good\" today.  A: The pt reports that he has been feeling \"good\" lately and describes how he enjoys listening to music. The pt's mother reports that the pt is doing \"well\" but notices that the pt has difficulty making eye contact when speaking with people.   This writer psychoeducates the pt on the importance of non-verbal communication and  models different ways to say things and how the message can change with non-verbal changes.   The pt reports understanding and agreement.   R: The pt is friendly and is able to speak to this writer immediately  P: The pt's mother schedules an appointment for 8 weeks as the pt wants in person appointments    Need for Community Resources Assessed: Yes    Resources Needed: No    If Yes, what resources: na    Diagnosis: Autism spectrum disorder  (primary encounter diagnosis)  ADHD (attention deficit hyperactivity disorder), combined type    Treatment Plan: Unchanged    Discharge Plan: Strategies Discussed to Maintain Gains    Next Appointment: 8 weeks    Vic Sands, FELIPEW   0

## 2024-04-18 NOTE — ASU DISCHARGE PLAN (ADULT/PEDIATRIC) - NURSING INSTRUCTIONS
Follow up with MD as recommended. Please do not eat or drink anything for 2 hours (10am)   For the next 24-48 hours, avoid spicy, greasy, citrus, dairy, carbonated beverages & jagged edged foods like dry crackers and toast. Have fluids at room temperature. Any difficulty breathing or shortness of breath please call 911 immediately.

## 2024-04-18 NOTE — ASU DISCHARGE PLAN (ADULT/PEDIATRIC) - FOLLOW UP APPOINTMENTS
206 may also call Recovery Room (PACU) 24/7 @ [phone]/[facility]

## 2024-04-24 RX ORDER — PEG-3350, SODIUM SULFATE, SODIUM CHLORIDE, POTASSIUM CHLORIDE, SODIUM ASCORBATE AND ASCORBIC ACID 7.5-2.691G
100 KIT ORAL
Qty: 1 | Refills: 0 | Status: ACTIVE | COMMUNITY
Start: 2024-04-01 | End: 1900-01-01

## 2024-04-29 PROBLEM — E74.39 GLUCOSE INTOLERANCE: Status: ACTIVE | Noted: 2022-11-07

## 2024-04-29 NOTE — ASU PATIENT PROFILE, ADULT - NSICDXPASTSURGICALHX_GEN_ALL_CORE_FT
PAST SURGICAL HISTORY:  H/O arthroscopy of right knee     H/O bilateral breast reduction surgery     H/O parotidectomy     H/O shoulder surgery left    H/O tubal ligation bilateral    History of abdominoplasty 2012, mini    History of bladder surgery sling    History of carpal tunnel release left    History of cataract surgery bilateral    History of lipoma removed from left shoulder    S/P cervical spinal fusion     S/P total knee replacement, right     S/P tracheoplasty 2018 can use size 7 ET tube "I have been told I am totally fine now"    Status post arthroscopy of hip left    Status post right breast lumpectomy 1996

## 2024-04-29 NOTE — ASU PATIENT PROFILE, ADULT - MEDICATION ADMINISTRATION INFO, PROFILE
To outbox please copy to scanning if signed order for anything other than nursing home or [facility].    no concerns

## 2024-04-29 NOTE — ASU PATIENT PROFILE, ADULT - NSICDXPASTMEDICALHX_GEN_ALL_CORE_FT
PAST MEDICAL HISTORY:  Allergic rhinitis, seasonal     Anxiety     Esophageal dysmotility     GERD (gastroesophageal reflux disease)     History of hyperkalemia     HLD (hyperlipidemia)     Hypothyroidism     IBS (irritable bowel syndrome)     Low serum vitamin D     Osteoarthritis of right knee     SVT (supraventricular tachycardia)     Tracheomalacia

## 2024-04-30 ENCOUNTER — APPOINTMENT (OUTPATIENT)
Dept: GASTROENTEROLOGY | Facility: HOSPITAL | Age: 54
End: 2024-04-30

## 2024-04-30 ENCOUNTER — OUTPATIENT (OUTPATIENT)
Dept: OUTPATIENT SERVICES | Facility: HOSPITAL | Age: 54
LOS: 1 days | Discharge: HOME | End: 2024-04-30
Payer: COMMERCIAL

## 2024-04-30 ENCOUNTER — TRANSCRIPTION ENCOUNTER (OUTPATIENT)
Age: 54
End: 2024-04-30

## 2024-04-30 VITALS
OXYGEN SATURATION: 100 % | HEART RATE: 76 BPM | SYSTOLIC BLOOD PRESSURE: 103 MMHG | RESPIRATION RATE: 12 BRPM | DIASTOLIC BLOOD PRESSURE: 61 MMHG

## 2024-04-30 VITALS
DIASTOLIC BLOOD PRESSURE: 72 MMHG | HEART RATE: 75 BPM | TEMPERATURE: 98 F | SYSTOLIC BLOOD PRESSURE: 119 MMHG | HEIGHT: 64 IN | OXYGEN SATURATION: 100 % | RESPIRATION RATE: 16 BRPM | WEIGHT: 123.02 LBS

## 2024-04-30 DIAGNOSIS — Z98.890 OTHER SPECIFIED POSTPROCEDURAL STATES: Chronic | ICD-10-CM

## 2024-04-30 DIAGNOSIS — Z96.651 PRESENCE OF RIGHT ARTIFICIAL KNEE JOINT: Chronic | ICD-10-CM

## 2024-04-30 DIAGNOSIS — Z86.018 PERSONAL HISTORY OF OTHER BENIGN NEOPLASM: Chronic | ICD-10-CM

## 2024-04-30 DIAGNOSIS — Z90.49 ACQUIRED ABSENCE OF OTHER SPECIFIED PARTS OF DIGESTIVE TRACT: Chronic | ICD-10-CM

## 2024-04-30 DIAGNOSIS — Z98.1 ARTHRODESIS STATUS: Chronic | ICD-10-CM

## 2024-04-30 DIAGNOSIS — D64.9 ANEMIA, UNSPECIFIED: ICD-10-CM

## 2024-04-30 DIAGNOSIS — Z98.51 TUBAL LIGATION STATUS: Chronic | ICD-10-CM

## 2024-04-30 DIAGNOSIS — Z98.49 CATARACT EXTRACTION STATUS, UNSPECIFIED EYE: Chronic | ICD-10-CM

## 2024-04-30 PROCEDURE — 43239 EGD BIOPSY SINGLE/MULTIPLE: CPT | Mod: GC

## 2024-04-30 PROCEDURE — 45378 DIAGNOSTIC COLONOSCOPY: CPT | Mod: GC

## 2024-04-30 RX ORDER — SODIUM CHLORIDE 9 MG/ML
500 INJECTION, SOLUTION INTRAVENOUS
Refills: 0 | Status: DISCONTINUED | OUTPATIENT
Start: 2024-04-30 | End: 2024-05-14

## 2024-04-30 NOTE — PRE PROCEDURE NOTE - PRE PROCEDURE EVALUATION
Pre-Endoscopy Evaluation    Referring Physician:                                    Indication for Procedure:  screening    Sedation by Anesthesia [X ]    PAST MEDICAL & SURGICAL HISTORY:  Hypothyroidism      IBS (irritable bowel syndrome)      Anxiety      Low serum vitamin D      Allergic rhinitis, seasonal      GERD (gastroesophageal reflux disease)      Esophageal dysmotility      HLD (hyperlipidemia)      Osteoarthritis of right knee      SVT (supraventricular tachycardia)      History of hyperkalemia      Tracheomalacia      History of abdominoplasty  2012, mini      History of carpal tunnel release  left      H/O arthroscopy of right knee      Status post arthroscopy of hip  left      History of cataract surgery  bilateral      H/O bilateral breast reduction surgery      History of lipoma  removed from left shoulder      H/O tubal ligation  bilateral      History of bladder surgery  sling      Status post right breast lumpectomy  1996      S/P tracheoplasty  2018 can use size 7 ET tube "I have been told I am totally fine now"      H/O parotidectomy      H/O shoulder surgery  left      S/P cervical spinal fusion      S/P total knee replacement, right          Latex allergy: [ ] yes [X] no    Smoking: [ ] yes  [X] no    AICD/PPM: [ ] yes   [X] no    Pertinent lab data:                      Physical Examination:  Daily Height in cm: 162.56 (30 Apr 2024 10:49)    Daily   Vital Signs Last 24 Hrs  T(C): 36.1 (30 Apr 2024 13:10), Max: 36.9 (30 Apr 2024 10:49)  T(F): 97 (30 Apr 2024 13:10), Max: 98.4 (30 Apr 2024 10:49)  HR: 82 (30 Apr 2024 13:10) (75 - 82)  BP: 95/51 (30 Apr 2024 13:10) (95/51 - 119/72)  BP(mean): 61 (30 Apr 2024 13:10) (61 - 61)  RR: 23 (30 Apr 2024 13:10) (16 - 23)  SpO2: 98% (30 Apr 2024 13:10) (98% - 100%)    Parameters below as of 30 Apr 2024 13:10  Patient On (Oxygen Delivery Method): room air        Constitutional: NAD    HEENT: PERRLA, EOMI,       Neck:  No JVD    Respiratory: CTAB/L    Cardiovascular: S1 and S2    Gastrointestinal: BS+, soft, NT/ND    Extremities: No peripheral edema    Neurological: A/O x 3, no focal deficits    Psychiatric: Normal mood, normal affect    : No Giles    Skin: No rashes    Comments:    ASA Class: I [ ]  II [ X]  III [ ]  IV [ ]    The patient is a suitable candidate for the planned procedure unless box checked [ ]  No, explain:

## 2024-04-30 NOTE — ASU DISCHARGE PLAN (ADULT/PEDIATRIC) - CALL YOUR DOCTOR IF YOU HAVE ANY OF THE FOLLOWING:
Bleeding that does not stop/Pain not relieved by Medications/Fever greater than (need to indicate Fahrenheit or Celsius)/Numbness, tingling, color or temperature change to extremity/Nausea and vomiting that does not stop/Inability to tolerate liquids or foods

## 2024-04-30 NOTE — ASU DISCHARGE PLAN (ADULT/PEDIATRIC) - NS MD DC FALL RISK RISK
For information on Fall & Injury Prevention, visit: [email or web address] OR  [email or web address] OR  https://www.cdc.gov/steadi/patient.html

## 2024-05-01 ENCOUNTER — APPOINTMENT (OUTPATIENT)
Dept: CARDIOLOGY | Facility: CLINIC | Age: 54
End: 2024-05-01
Payer: COMMERCIAL

## 2024-05-01 VITALS — HEIGHT: 64 IN | WEIGHT: 124 LBS | BODY MASS INDEX: 21.17 KG/M2

## 2024-05-01 DIAGNOSIS — E74.39 OTHER DISORDERS OF INTESTINAL CARBOHYDRATE ABSORPTION: ICD-10-CM

## 2024-05-01 PROCEDURE — 99215 OFFICE O/P EST HI 40 MIN: CPT

## 2024-05-01 PROCEDURE — G2211 COMPLEX E/M VISIT ADD ON: CPT | Mod: NC,1L

## 2024-05-02 ENCOUNTER — APPOINTMENT (OUTPATIENT)
Dept: THORACIC SURGERY | Facility: CLINIC | Age: 54
End: 2024-05-02
Payer: COMMERCIAL

## 2024-05-02 VITALS
WEIGHT: 123 LBS | SYSTOLIC BLOOD PRESSURE: 104 MMHG | RESPIRATION RATE: 17 BRPM | HEART RATE: 89 BPM | DIASTOLIC BLOOD PRESSURE: 72 MMHG | OXYGEN SATURATION: 96 % | HEIGHT: 64 IN | BODY MASS INDEX: 21 KG/M2

## 2024-05-02 PROCEDURE — 99214 OFFICE O/P EST MOD 30 MIN: CPT

## 2024-05-02 NOTE — ASSESSMENT
[FreeTextEntry1] : Ms. ANDREW AGUILAR, 53 year old female, never smoker, w/ hx of HLD, pertussis, GERD, recent diagnosis of SVT. She is status post Right VATS, robotic assisted, tracheobronchoplasty with Prolene mesh on 3/12/18 by Dr. Goodson. She was initially referred by Dr. Summers.  Now s/p awake bronchoscopy on 4/18/24. Upon deep exhalation, although we appreciated approximately 50% posterior collapse of the distal trachea and bilateral mainstem bronchi. More noticeably just proximal to the previous tracheobronchoplasty repair in the upper trachea, there was approximately 60% posterior collapse of the posterior wall.  I have reviewed the patient's medical records and diagnostic images at time of this office consultation and have made the following recommendation: 1. Awake bronch findings reviewed and explained to patient, only mild to moderate collapse noted, no surgery indicated. Patient to f/u with Dr. Jack Summers, RTC as needed.   I, Dr. HUYNH, NATHAN RUIZ, personally performed the evaluation and management (E/M) services for this established patient who presents today with (a) new problem(s)/exacerbation of (an) existing condition(s). That E/M includes conducting the examination, assessing all new/exacerbated conditions, and establishing a new plan of care. Today, my ACP, Heather Desir NP was here to observe my evaluation and management services for this new problem/exacerbated condition to be followed going forward.

## 2024-05-02 NOTE — HISTORY OF PRESENT ILLNESS
[FreeTextEntry1] : Ms. ANDREW AGUILAR, 53 year old female, never smoker, w/ hx of HLD, pertussis, GERD, recent diagnosis of SVT. She is status post Right VATS, robotic assisted, tracheobronchoplasty with Prolene mesh on 3/12/18 by Dr. Goodson. She was initially referred by Dr. Summers.  CT CHEST on 8/18/2021: - No consolidations, edema, effusion or pneumothorax - On expiratory imaging there is near complete collapse of the posterior membrane of the trachea and bilateral mainstem bronchi, findings consistent with tracheobronchomalacia  She is s/p Awake/Dynamic Bronch on 9/17/2021 by Dr. Nathaniel Goodson and Dr. Vega Stovall. Finding revealed excessive dynamic airway collapse of appx 50% at distal trachea and it did not appear to be obstructive or limiting.   CT Chest Dynamic on 2/28/23: - Marked collapse of the trachea is noted on the dynamic expiratory images, findings suggestive of tracheomalacia. - a 0.2 cm nodule in the anterior segment of the RLL is unchanged when compared to previous exam.   CT Chest Dynamic on 2/19/24: - approximately 65% collapse of the trachea on dynamic expiration (measures 1.7 cm in inspiration, 6 mm in expiration in transverse dimension), suggestive of tracheomalacia. - There is approximately 40% collapse of the right mainstem bronchus on expiration (1 cm in inspiration, 6 mm in expiration). There is approximately 30% collapse of the left mainstem bronchus on expiration (1 cm in inspiration, 7 mm in expiration).  - No pleural effusion. Stable scattered sub-4 mm pulmonary nodules, for example in the right lower lobe on series 3, images 70, 74 and 82. The lungs are otherwise clear. - No thoracic adenopathy.  Now s/p awake bronchoscopy on 4/18/24. Upon deep exhalation, although we appreciated approximately 50% posterior collapse of the distal trachea and bilateral mainstem bronchi. More noticeably just proximal to the previous tracheobronchoplasty repair in the upper trachea, there was approximately 60% posterior collapse of the posterior wall.  Pt presents today for follow up. Admits to "suffocating sensation" at times. Denies cough.

## 2024-05-02 NOTE — CONSULT LETTER
[Dear  ___] : Dear  [unfilled], [Consult Letter:] : I had the pleasure of evaluating your patient, [unfilled]. [( Thank you for referring [unfilled] for consultation for _____ )] : Thank you for referring [unfilled] for consultation for [unfilled] [Please see my note below.] : Please see my note below. [Consult Closing:] : Thank you very much for allowing me to participate in the care of this patient.  If you have any questions, please do not hesitate to contact me. [Sincerely,] : Sincerely, [FreeTextEntry2] : Jack Summers MD (Pulm) [FreeTextEntry3] : Hans Segundo MD, MPH \par  System Director of Thoracic Surgery \par  Director of Comprehensive Lung and Foregut Sentinel \par  Professor Cardiovascular & Thoracic Surgery  \par  [facility] [employer] [facility]\par  \par  [facility]\par  [address]\par  Oncology [address]\[place] [address]\par  Tel: [phone]\par  Fax: [phone]\par

## 2024-05-09 ENCOUNTER — APPOINTMENT (OUTPATIENT)
Dept: PAIN MANAGEMENT | Facility: CLINIC | Age: 54
End: 2024-05-09

## 2024-05-13 ENCOUNTER — TRANSCRIPTION ENCOUNTER (OUTPATIENT)
Age: 54
End: 2024-05-13

## 2024-05-15 ENCOUNTER — TRANSCRIPTION ENCOUNTER (OUTPATIENT)
Age: 54
End: 2024-05-15

## 2024-05-15 RX ORDER — ALIROCUMAB 75 MG/ML
75 INJECTION, SOLUTION SUBCUTANEOUS
Qty: 1 | Refills: 6 | Status: ACTIVE | COMMUNITY
Start: 2023-08-18 | End: 1900-01-01

## 2024-05-21 NOTE — PHARMACOTHERAPY INTERVENTION NOTE - COMMENTS
Admission medication reconciliation POD0
Patient tolerated cefazolin perioperatively despite reported allergy to penicillin. Profile updated. 
Transition of Care video discharge education - medication calendar given to patient 
Patient reports allergy to penicillin. Cefazolin is drug of choice for antimicrobial prophylaxis for TKA. Patient tolerated cefazolin perioperatively despite reported allergy to penicillin. Profile updated. 
Attending Attestation (For Attendings USE Only)...

## 2024-05-23 ENCOUNTER — TRANSCRIPTION ENCOUNTER (OUTPATIENT)
Age: 54
End: 2024-05-23

## 2024-06-05 ENCOUNTER — APPOINTMENT (OUTPATIENT)
Dept: VASCULAR SURGERY | Facility: CLINIC | Age: 54
End: 2024-06-05

## 2024-06-15 ENCOUNTER — TRANSCRIPTION ENCOUNTER (OUTPATIENT)
Age: 54
End: 2024-06-15

## 2024-06-18 PROBLEM — R07.9 CHEST PAIN: Status: ACTIVE | Noted: 2020-09-03

## 2024-06-18 PROBLEM — E78.5 HYPERLIPEMIA: Status: ACTIVE | Noted: 2020-09-03

## 2024-06-19 ENCOUNTER — APPOINTMENT (OUTPATIENT)
Dept: CARDIOLOGY | Facility: CLINIC | Age: 54
End: 2024-06-19
Payer: COMMERCIAL

## 2024-06-19 DIAGNOSIS — E78.5 HYPERLIPIDEMIA, UNSPECIFIED: ICD-10-CM

## 2024-06-19 DIAGNOSIS — R07.9 CHEST PAIN, UNSPECIFIED: ICD-10-CM

## 2024-06-19 PROCEDURE — G2211 COMPLEX E/M VISIT ADD ON: CPT | Mod: NC

## 2024-06-19 PROCEDURE — 99214 OFFICE O/P EST MOD 30 MIN: CPT

## 2024-06-19 RX ORDER — TIRZEPATIDE 15 MG/.5ML
15 INJECTION, SOLUTION SUBCUTANEOUS
Qty: 4 | Refills: 1 | Status: ACTIVE | COMMUNITY
Start: 1900-01-01 | End: 1900-01-01

## 2024-06-27 ENCOUNTER — TRANSCRIPTION ENCOUNTER (OUTPATIENT)
Age: 54
End: 2024-06-27

## 2024-06-28 ENCOUNTER — TRANSCRIPTION ENCOUNTER (OUTPATIENT)
Age: 54
End: 2024-06-28

## 2024-07-01 ENCOUNTER — APPOINTMENT (OUTPATIENT)
Dept: PULMONOLOGY | Facility: CLINIC | Age: 54
End: 2024-07-01
Payer: COMMERCIAL

## 2024-07-01 VITALS
DIASTOLIC BLOOD PRESSURE: 80 MMHG | WEIGHT: 123 LBS | RESPIRATION RATE: 16 BRPM | OXYGEN SATURATION: 98 % | BODY MASS INDEX: 21 KG/M2 | HEART RATE: 91 BPM | TEMPERATURE: 96.3 F | HEIGHT: 64 IN | SYSTOLIC BLOOD PRESSURE: 110 MMHG

## 2024-07-01 DIAGNOSIS — K21.9 GASTRO-ESOPHAGEAL REFLUX DISEASE W/OUT ESOPHAGITIS: ICD-10-CM

## 2024-07-01 DIAGNOSIS — J30.89 OTHER ALLERGIC RHINITIS: ICD-10-CM

## 2024-07-01 DIAGNOSIS — R76.8 OTHER SPECIFIED ABNORMAL IMMUNOLOGICAL FINDINGS IN SERUM: ICD-10-CM

## 2024-07-01 DIAGNOSIS — J39.8 OTHER SPECIFIED DISEASES OF UPPER RESPIRATORY TRACT: ICD-10-CM

## 2024-07-01 DIAGNOSIS — R79.89 OTHER SPECIFIED ABNORMAL FINDINGS OF BLOOD CHEMISTRY: ICD-10-CM

## 2024-07-01 DIAGNOSIS — R06.02 SHORTNESS OF BREATH: ICD-10-CM

## 2024-07-01 DIAGNOSIS — J30.9 ALLERGIC RHINITIS, UNSPECIFIED: ICD-10-CM

## 2024-07-01 PROCEDURE — 94727 GAS DIL/WSHOT DETER LNG VOL: CPT

## 2024-07-01 PROCEDURE — 95012 NITRIC OXIDE EXP GAS DETER: CPT

## 2024-07-01 PROCEDURE — 94618 PULMONARY STRESS TESTING: CPT

## 2024-07-01 PROCEDURE — 94729 DIFFUSING CAPACITY: CPT

## 2024-07-01 PROCEDURE — 99214 OFFICE O/P EST MOD 30 MIN: CPT | Mod: 25

## 2024-07-01 PROCEDURE — 94010 BREATHING CAPACITY TEST: CPT

## 2024-07-01 PROCEDURE — G2211 COMPLEX E/M VISIT ADD ON: CPT | Mod: NC

## 2024-07-10 LAB
BASOPHILS # BLD AUTO: 0.06 K/UL
BASOPHILS NFR BLD AUTO: 1.3 %
EOSINOPHIL # BLD AUTO: 0.09 K/UL
EOSINOPHIL NFR BLD AUTO: 2 %
HCT VFR BLD CALC: 39 %
HGB BLD-MCNC: 12.8 G/DL
IMM GRANULOCYTES NFR BLD AUTO: 0.2 %
LYMPHOCYTES # BLD AUTO: 1.77 K/UL
LYMPHOCYTES NFR BLD AUTO: 39.5 %
MAN DIFF?: NORMAL
MCHC RBC-ENTMCNC: 30.4 PG
MCHC RBC-ENTMCNC: 32.8 GM/DL
MCV RBC AUTO: 92.6 FL
MONOCYTES # BLD AUTO: 0.42 K/UL
MONOCYTES NFR BLD AUTO: 9.4 %
NEUTROPHILS # BLD AUTO: 2.13 K/UL
NEUTROPHILS NFR BLD AUTO: 47.6 %
PLATELET # BLD AUTO: 285 K/UL
RBC # BLD: 4.21 M/UL
RBC # FLD: 12.9 %
TOTAL IGE SMQN RAST: 111 KU/L
WBC # FLD AUTO: 4.48 K/UL

## 2024-07-15 ENCOUNTER — TRANSCRIPTION ENCOUNTER (OUTPATIENT)
Age: 54
End: 2024-07-15

## 2024-07-18 ENCOUNTER — TRANSCRIPTION ENCOUNTER (OUTPATIENT)
Age: 54
End: 2024-07-18

## 2024-08-05 ENCOUNTER — RX RENEWAL (OUTPATIENT)
Age: 54
End: 2024-08-05

## 2024-08-06 ENCOUNTER — APPOINTMENT (OUTPATIENT)
Dept: CARDIOLOGY | Facility: CLINIC | Age: 54
End: 2024-08-06

## 2024-08-06 PROCEDURE — G2211 COMPLEX E/M VISIT ADD ON: CPT | Mod: NC

## 2024-08-06 PROCEDURE — 99215 OFFICE O/P EST HI 40 MIN: CPT

## 2024-08-09 ENCOUNTER — RX RENEWAL (OUTPATIENT)
Age: 54
End: 2024-08-09

## 2024-09-23 ENCOUNTER — RX RENEWAL (OUTPATIENT)
Age: 54
End: 2024-09-23

## 2024-10-21 ENCOUNTER — RX RENEWAL (OUTPATIENT)
Age: 54
End: 2024-10-21

## 2024-10-28 ENCOUNTER — RX RENEWAL (OUTPATIENT)
Age: 54
End: 2024-10-28

## 2024-11-05 ENCOUNTER — APPOINTMENT (OUTPATIENT)
Dept: PULMONOLOGY | Facility: CLINIC | Age: 54
End: 2024-11-05

## 2024-11-09 ENCOUNTER — RX RENEWAL (OUTPATIENT)
Age: 54
End: 2024-11-09

## 2024-12-02 ENCOUNTER — RX RENEWAL (OUTPATIENT)
Age: 54
End: 2024-12-02

## 2024-12-04 NOTE — ASU PREOP CHECKLIST - SPO2 (%)
Attempting to call pt to ask her to come in 20 mins early for MWV on 12/19/24. No answer, mailbox full, unable to leave message.   99

## 2024-12-14 ENCOUNTER — RX RENEWAL (OUTPATIENT)
Age: 54
End: 2024-12-14

## 2024-12-21 ENCOUNTER — OUTPATIENT (OUTPATIENT)
Dept: OUTPATIENT SERVICES | Facility: HOSPITAL | Age: 54
LOS: 1 days | End: 2024-12-21

## 2024-12-21 ENCOUNTER — APPOINTMENT (OUTPATIENT)
Dept: MRI IMAGING | Facility: CLINIC | Age: 54
End: 2024-12-21

## 2024-12-21 DIAGNOSIS — Z98.890 OTHER SPECIFIED POSTPROCEDURAL STATES: Chronic | ICD-10-CM

## 2024-12-21 DIAGNOSIS — Z98.51 TUBAL LIGATION STATUS: Chronic | ICD-10-CM

## 2024-12-21 DIAGNOSIS — Z96.651 PRESENCE OF RIGHT ARTIFICIAL KNEE JOINT: Chronic | ICD-10-CM

## 2024-12-21 DIAGNOSIS — Z90.49 ACQUIRED ABSENCE OF OTHER SPECIFIED PARTS OF DIGESTIVE TRACT: Chronic | ICD-10-CM

## 2024-12-21 DIAGNOSIS — Z86.018 PERSONAL HISTORY OF OTHER BENIGN NEOPLASM: Chronic | ICD-10-CM

## 2024-12-21 DIAGNOSIS — Z00.8 ENCOUNTER FOR OTHER GENERAL EXAMINATION: ICD-10-CM

## 2024-12-21 DIAGNOSIS — Z98.1 ARTHRODESIS STATUS: Chronic | ICD-10-CM

## 2024-12-21 DIAGNOSIS — Z98.49 CATARACT EXTRACTION STATUS, UNSPECIFIED EYE: Chronic | ICD-10-CM

## 2024-12-26 ENCOUNTER — APPOINTMENT (OUTPATIENT)
Dept: ULTRASOUND IMAGING | Facility: CLINIC | Age: 54
End: 2024-12-26
Payer: COMMERCIAL

## 2024-12-26 ENCOUNTER — APPOINTMENT (OUTPATIENT)
Dept: MRI IMAGING | Facility: CLINIC | Age: 54
End: 2024-12-26
Payer: COMMERCIAL

## 2024-12-26 PROCEDURE — 76536 US EXAM OF HEAD AND NECK: CPT

## 2024-12-26 PROCEDURE — 72141 MRI NECK SPINE W/O DYE: CPT

## 2025-02-07 NOTE — PHYSICAL THERAPY INITIAL EVALUATION ADULT - LEVEL OF INDEPENDENCE: STAND/SIT, REHAB EVAL
[de-identified] : A lengthy discussion was had with the patient regarding her condition.   Rx MRI cervical spine- r/o HNP, stenosis.   Rx MRI L shoulder- r/o RC injury.   Can review results and plan via phone or telehealth. The patient's questions were sought and answered satisfactorily.  Taina RUIZ NP am acting as a scribe for Dr. Frank Schwab. supervision

## 2025-02-26 ENCOUNTER — NON-APPOINTMENT (OUTPATIENT)
Age: 55
End: 2025-02-26

## 2025-02-26 ENCOUNTER — APPOINTMENT (OUTPATIENT)
Dept: CARDIOLOGY | Facility: CLINIC | Age: 55
End: 2025-02-26
Payer: COMMERCIAL

## 2025-02-26 VITALS
WEIGHT: 124 LBS | SYSTOLIC BLOOD PRESSURE: 104 MMHG | DIASTOLIC BLOOD PRESSURE: 63 MMHG | BODY MASS INDEX: 21.28 KG/M2 | HEART RATE: 87 BPM | OXYGEN SATURATION: 98 %

## 2025-02-26 DIAGNOSIS — E78.5 HYPERLIPIDEMIA, UNSPECIFIED: ICD-10-CM

## 2025-02-26 DIAGNOSIS — R07.9 CHEST PAIN, UNSPECIFIED: ICD-10-CM

## 2025-02-26 DIAGNOSIS — D64.9 ANEMIA, UNSPECIFIED: ICD-10-CM

## 2025-02-26 DIAGNOSIS — E87.5 HYPERKALEMIA: ICD-10-CM

## 2025-02-26 PROCEDURE — G0537: CPT

## 2025-02-26 PROCEDURE — 99402 PREV MED CNSL INDIV APPRX 30: CPT

## 2025-02-26 PROCEDURE — 93000 ELECTROCARDIOGRAM COMPLETE: CPT

## 2025-02-26 PROCEDURE — 99214 OFFICE O/P EST MOD 30 MIN: CPT | Mod: 25

## 2025-03-05 ENCOUNTER — APPOINTMENT (OUTPATIENT)
Dept: CARDIOLOGY | Facility: CLINIC | Age: 55
End: 2025-03-05
Payer: COMMERCIAL

## 2025-03-05 PROCEDURE — 93306 TTE W/DOPPLER COMPLETE: CPT

## 2025-03-08 ENCOUNTER — OUTPATIENT (OUTPATIENT)
Dept: OUTPATIENT SERVICES | Facility: HOSPITAL | Age: 55
LOS: 1 days | End: 2025-03-08

## 2025-03-08 ENCOUNTER — APPOINTMENT (OUTPATIENT)
Dept: CT IMAGING | Facility: CLINIC | Age: 55
End: 2025-03-08
Payer: COMMERCIAL

## 2025-03-08 DIAGNOSIS — Z98.890 OTHER SPECIFIED POSTPROCEDURAL STATES: Chronic | ICD-10-CM

## 2025-03-08 DIAGNOSIS — Z96.651 PRESENCE OF RIGHT ARTIFICIAL KNEE JOINT: Chronic | ICD-10-CM

## 2025-03-08 DIAGNOSIS — Z86.018 PERSONAL HISTORY OF OTHER BENIGN NEOPLASM: Chronic | ICD-10-CM

## 2025-03-08 DIAGNOSIS — J39.8 OTHER SPECIFIED DISEASES OF UPPER RESPIRATORY TRACT: ICD-10-CM

## 2025-03-08 DIAGNOSIS — Z98.1 ARTHRODESIS STATUS: Chronic | ICD-10-CM

## 2025-03-08 DIAGNOSIS — Z90.49 ACQUIRED ABSENCE OF OTHER SPECIFIED PARTS OF DIGESTIVE TRACT: Chronic | ICD-10-CM

## 2025-03-08 DIAGNOSIS — Z98.51 TUBAL LIGATION STATUS: Chronic | ICD-10-CM

## 2025-03-08 DIAGNOSIS — Z98.49 CATARACT EXTRACTION STATUS, UNSPECIFIED EYE: Chronic | ICD-10-CM

## 2025-03-08 PROCEDURE — 71250 CT THORAX DX C-: CPT | Mod: 26

## 2025-03-20 ENCOUNTER — NON-APPOINTMENT (OUTPATIENT)
Age: 55
End: 2025-03-20

## 2025-03-20 ENCOUNTER — APPOINTMENT (OUTPATIENT)
Dept: THORACIC SURGERY | Facility: CLINIC | Age: 55
End: 2025-03-20

## 2025-03-20 DIAGNOSIS — J39.8 OTHER SPECIFIED DISEASES OF UPPER RESPIRATORY TRACT: ICD-10-CM

## 2025-04-11 ENCOUNTER — RX RENEWAL (OUTPATIENT)
Age: 55
End: 2025-04-11

## 2025-04-11 RX ORDER — PRUCALOPRIDE 2 MG/1
2 TABLET ORAL
Qty: 90 | Refills: 1 | Status: DISCONTINUED | COMMUNITY
Start: 2025-04-11 | End: 2025-04-11

## 2025-04-24 ENCOUNTER — APPOINTMENT (OUTPATIENT)
Dept: MRI IMAGING | Facility: CLINIC | Age: 55
End: 2025-04-24

## 2025-04-25 ENCOUNTER — APPOINTMENT (OUTPATIENT)
Dept: MRI IMAGING | Facility: CLINIC | Age: 55
End: 2025-04-25

## 2025-05-01 ENCOUNTER — APPOINTMENT (OUTPATIENT)
Dept: GASTROENTEROLOGY | Facility: CLINIC | Age: 55
End: 2025-05-01

## 2025-05-07 ENCOUNTER — APPOINTMENT (OUTPATIENT)
Dept: CARDIOLOGY | Facility: CLINIC | Age: 55
End: 2025-05-07
Payer: COMMERCIAL

## 2025-05-07 DIAGNOSIS — R07.9 CHEST PAIN, UNSPECIFIED: ICD-10-CM

## 2025-05-07 DIAGNOSIS — E66.3 OVERWEIGHT: ICD-10-CM

## 2025-05-07 DIAGNOSIS — E87.5 HYPERKALEMIA: ICD-10-CM

## 2025-05-07 DIAGNOSIS — E78.5 HYPERLIPIDEMIA, UNSPECIFIED: ICD-10-CM

## 2025-05-07 PROCEDURE — 99214 OFFICE O/P EST MOD 30 MIN: CPT | Mod: 95

## 2025-05-07 PROCEDURE — G2211 COMPLEX E/M VISIT ADD ON: CPT | Mod: NC,95

## 2025-05-09 ENCOUNTER — APPOINTMENT (OUTPATIENT)
Dept: ULTRASOUND IMAGING | Facility: CLINIC | Age: 55
End: 2025-05-09
Payer: COMMERCIAL

## 2025-05-09 PROCEDURE — 76882 US LMTD JT/FCL EVL NVASC XTR: CPT | Mod: RT

## 2025-05-13 ENCOUNTER — APPOINTMENT (OUTPATIENT)
Dept: ELECTROPHYSIOLOGY | Facility: CLINIC | Age: 55
End: 2025-05-13
Payer: COMMERCIAL

## 2025-05-13 ENCOUNTER — NON-APPOINTMENT (OUTPATIENT)
Age: 55
End: 2025-05-13

## 2025-05-13 VITALS
WEIGHT: 120 LBS | OXYGEN SATURATION: 100 % | HEART RATE: 70 BPM | DIASTOLIC BLOOD PRESSURE: 69 MMHG | SYSTOLIC BLOOD PRESSURE: 104 MMHG | BODY MASS INDEX: 20.6 KG/M2

## 2025-05-13 DIAGNOSIS — R00.2 PALPITATIONS: ICD-10-CM

## 2025-05-13 PROCEDURE — 93000 ELECTROCARDIOGRAM COMPLETE: CPT

## 2025-05-13 PROCEDURE — 99215 OFFICE O/P EST HI 40 MIN: CPT | Mod: 25

## 2025-05-19 ENCOUNTER — APPOINTMENT (OUTPATIENT)
Dept: ULTRASOUND IMAGING | Facility: CLINIC | Age: 55
End: 2025-05-19

## 2025-06-09 ENCOUNTER — APPOINTMENT (OUTPATIENT)
Dept: ORTHOPEDIC SURGERY | Facility: CLINIC | Age: 55
End: 2025-06-09

## 2025-06-16 ENCOUNTER — RX RENEWAL (OUTPATIENT)
Age: 55
End: 2025-06-16

## 2025-06-17 ENCOUNTER — RX RENEWAL (OUTPATIENT)
Age: 55
End: 2025-06-17

## 2025-06-25 ENCOUNTER — APPOINTMENT (OUTPATIENT)
Dept: ORTHOPEDIC SURGERY | Facility: CLINIC | Age: 55
End: 2025-06-25
Payer: COMMERCIAL

## 2025-06-25 PROBLEM — M70.61 TROCHANTERIC BURSITIS OF RIGHT HIP: Status: ACTIVE | Noted: 2025-06-25

## 2025-06-25 PROCEDURE — 99215 OFFICE O/P EST HI 40 MIN: CPT | Mod: 25

## 2025-06-25 PROCEDURE — 20551 NJX 1 TENDON ORIGIN/INSJ: CPT

## 2025-06-26 ENCOUNTER — NON-APPOINTMENT (OUTPATIENT)
Age: 55
End: 2025-06-26

## 2025-06-27 ENCOUNTER — NON-APPOINTMENT (OUTPATIENT)
Age: 55
End: 2025-06-27

## 2025-07-01 RX ORDER — TIRZEPATIDE 2.5 MG/.5ML
2.5 INJECTION, SOLUTION SUBCUTANEOUS
Qty: 4 | Refills: 0 | Status: ACTIVE | COMMUNITY
Start: 2025-07-01 | End: 1900-01-01

## 2025-07-02 ENCOUNTER — OUTPATIENT (OUTPATIENT)
Dept: OUTPATIENT SERVICES | Facility: HOSPITAL | Age: 55
LOS: 1 days | End: 2025-07-02
Payer: COMMERCIAL

## 2025-07-02 VITALS
WEIGHT: 131.84 LBS | OXYGEN SATURATION: 100 % | HEART RATE: 74 BPM | SYSTOLIC BLOOD PRESSURE: 112 MMHG | RESPIRATION RATE: 18 BRPM | HEIGHT: 64 IN | DIASTOLIC BLOOD PRESSURE: 77 MMHG | TEMPERATURE: 98 F

## 2025-07-02 DIAGNOSIS — Z01.818 ENCOUNTER FOR OTHER PREPROCEDURAL EXAMINATION: ICD-10-CM

## 2025-07-02 DIAGNOSIS — Z01.812 ENCOUNTER FOR PREPROCEDURAL LABORATORY EXAMINATION: ICD-10-CM

## 2025-07-02 DIAGNOSIS — M54.16 RADICULOPATHY, LUMBAR REGION: ICD-10-CM

## 2025-07-02 DIAGNOSIS — Z86.018 PERSONAL HISTORY OF OTHER BENIGN NEOPLASM: Chronic | ICD-10-CM

## 2025-07-02 DIAGNOSIS — Z98.890 OTHER SPECIFIED POSTPROCEDURAL STATES: Chronic | ICD-10-CM

## 2025-07-02 DIAGNOSIS — Z96.651 PRESENCE OF RIGHT ARTIFICIAL KNEE JOINT: Chronic | ICD-10-CM

## 2025-07-02 DIAGNOSIS — Z98.49 CATARACT EXTRACTION STATUS, UNSPECIFIED EYE: Chronic | ICD-10-CM

## 2025-07-02 DIAGNOSIS — Z98.1 ARTHRODESIS STATUS: Chronic | ICD-10-CM

## 2025-07-02 DIAGNOSIS — G95.20 UNSPECIFIED CORD COMPRESSION: ICD-10-CM

## 2025-07-02 DIAGNOSIS — E03.9 HYPOTHYROIDISM, UNSPECIFIED: ICD-10-CM

## 2025-07-02 DIAGNOSIS — R00.2 PALPITATIONS: ICD-10-CM

## 2025-07-02 DIAGNOSIS — Z98.51 TUBAL LIGATION STATUS: Chronic | ICD-10-CM

## 2025-07-02 DIAGNOSIS — Z90.49 ACQUIRED ABSENCE OF OTHER SPECIFIED PARTS OF DIGESTIVE TRACT: Chronic | ICD-10-CM

## 2025-07-02 LAB
ALBUMIN SERPL ELPH-MCNC: 3.8 G/DL — SIGNIFICANT CHANGE UP (ref 3.3–5)
ALP SERPL-CCNC: 66 U/L — SIGNIFICANT CHANGE UP (ref 40–120)
ALT FLD-CCNC: 44 U/L — SIGNIFICANT CHANGE UP (ref 12–78)
ANION GAP SERPL CALC-SCNC: 4 MMOL/L — LOW (ref 5–17)
APTT BLD: 31.3 SEC — SIGNIFICANT CHANGE UP (ref 26.1–36.8)
AST SERPL-CCNC: 24 U/L — SIGNIFICANT CHANGE UP (ref 15–37)
BASOPHILS # BLD AUTO: 0.05 K/UL — SIGNIFICANT CHANGE UP (ref 0–0.2)
BASOPHILS NFR BLD AUTO: 1.2 % — SIGNIFICANT CHANGE UP (ref 0–2)
BILIRUB SERPL-MCNC: 1 MG/DL — SIGNIFICANT CHANGE UP (ref 0.2–1.2)
BUN SERPL-MCNC: 20 MG/DL — SIGNIFICANT CHANGE UP (ref 7–23)
CALCIUM SERPL-MCNC: 9 MG/DL — SIGNIFICANT CHANGE UP (ref 8.5–10.1)
CHLORIDE SERPL-SCNC: 106 MMOL/L — SIGNIFICANT CHANGE UP (ref 96–108)
CO2 SERPL-SCNC: 29 MMOL/L — SIGNIFICANT CHANGE UP (ref 22–31)
CREAT SERPL-MCNC: 0.64 MG/DL — SIGNIFICANT CHANGE UP (ref 0.5–1.3)
EGFR: 103 ML/MIN/1.73M2 — SIGNIFICANT CHANGE UP
EGFR: 103 ML/MIN/1.73M2 — SIGNIFICANT CHANGE UP
EOSINOPHIL # BLD AUTO: 0.09 K/UL — SIGNIFICANT CHANGE UP (ref 0–0.5)
EOSINOPHIL NFR BLD AUTO: 2.2 % — SIGNIFICANT CHANGE UP (ref 0–6)
GLUCOSE SERPL-MCNC: 92 MG/DL — SIGNIFICANT CHANGE UP (ref 70–99)
HCT VFR BLD CALC: 39.6 % — SIGNIFICANT CHANGE UP (ref 34.5–45)
HGB BLD-MCNC: 13.3 G/DL — SIGNIFICANT CHANGE UP (ref 11.5–15.5)
IMM GRANULOCYTES NFR BLD AUTO: 0.2 % — SIGNIFICANT CHANGE UP (ref 0–0.9)
INR BLD: 0.9 RATIO — SIGNIFICANT CHANGE UP (ref 0.85–1.16)
LYMPHOCYTES # BLD AUTO: 1.25 K/UL — SIGNIFICANT CHANGE UP (ref 1–3.3)
LYMPHOCYTES # BLD AUTO: 30 % — SIGNIFICANT CHANGE UP (ref 13–44)
MCHC RBC-ENTMCNC: 31.3 PG — SIGNIFICANT CHANGE UP (ref 27–34)
MCHC RBC-ENTMCNC: 33.6 G/DL — SIGNIFICANT CHANGE UP (ref 32–36)
MCV RBC AUTO: 93.2 FL — SIGNIFICANT CHANGE UP (ref 80–100)
MONOCYTES # BLD AUTO: 0.37 K/UL — SIGNIFICANT CHANGE UP (ref 0–0.9)
MONOCYTES NFR BLD AUTO: 8.9 % — SIGNIFICANT CHANGE UP (ref 2–14)
NEUTROPHILS # BLD AUTO: 2.39 K/UL — SIGNIFICANT CHANGE UP (ref 1.8–7.4)
NEUTROPHILS NFR BLD AUTO: 57.5 % — SIGNIFICANT CHANGE UP (ref 43–77)
NRBC BLD AUTO-RTO: 0 /100 WBCS — SIGNIFICANT CHANGE UP (ref 0–0)
PLATELET # BLD AUTO: 256 K/UL — SIGNIFICANT CHANGE UP (ref 150–400)
POTASSIUM SERPL-MCNC: 4.1 MMOL/L — SIGNIFICANT CHANGE UP (ref 3.5–5.3)
POTASSIUM SERPL-SCNC: 4.1 MMOL/L — SIGNIFICANT CHANGE UP (ref 3.5–5.3)
PROT SERPL-MCNC: 7.2 GM/DL — SIGNIFICANT CHANGE UP (ref 6–8.3)
PROTHROM AB SERPL-ACNC: 10.2 SEC — SIGNIFICANT CHANGE UP (ref 9.9–13.4)
RBC # BLD: 4.25 M/UL — SIGNIFICANT CHANGE UP (ref 3.8–5.2)
RBC # FLD: 13.2 % — SIGNIFICANT CHANGE UP (ref 10.3–14.5)
SODIUM SERPL-SCNC: 139 MMOL/L — SIGNIFICANT CHANGE UP (ref 135–145)
WBC # BLD: 4.16 K/UL — SIGNIFICANT CHANGE UP (ref 3.8–10.5)
WBC # FLD AUTO: 4.16 K/UL — SIGNIFICANT CHANGE UP (ref 3.8–10.5)

## 2025-07-02 PROCEDURE — 93010 ELECTROCARDIOGRAM REPORT: CPT

## 2025-07-02 NOTE — H&P PST ADULT - ATTENDING COMMENTS
lumbar stenosis/radciulopathy   indicated for lumbar decompression   r/b/e of the procedure discussed   questions answered   well informed and would like to proceed

## 2025-07-02 NOTE — H&P PST ADULT - ASSESSMENT
lumbar compression   CAPRINI SCORE    AGE RELATED RISK FACTORS                                                             [x ] Age 41-60 years                                            (1 Point)  [ ] Age: 61-74 years                                           (2 Points)                 [ ] Age= 75 years                                                (3 Points)             DISEASE RELATED RISK FACTORS                                                       [ ] Edema in the lower extremities                 (1 Point)                     [ ] Varicose veins                                               (1 Point)                                 x[ ] BMI > 25 Kg/m2                                            (1 Point)                                  [ ] Serious infection (ie PNA)                            (1 Point)                     [ ] Lung disease ( COPD, Emphysema)            (1 Point)                                                                          [ ] Acute myocardial infarction                         (1 Point)                  [ ] Congestive heart failure (in the previous month)  (1 Point)         [ ] Inflammatory bowel disease                            (1 Point)                  [ ] Central venous access, PICC or Port               (2 points)       (within the last month)                                                                [ ] Stroke (in the previous month)                        (5 Points)    [ ] Previous or present malignancy                       (2 points)                                                                                                                                                         HEMATOLOGY RELATED FACTORS                                                         [ ] Prior episodes of VTE                                     (3 Points)                     [ ] Positive family history for VTE                      (3 Points)                  [ ] Prothrombin 09238 A                                     (3 Points)                     [ ] Factor V Leiden                                                (3 Points)                        [ ] Lupus anticoagulants                                      (3 Points)                                                           [ ] Anticardiolipin antibodies                              (3 Points)                                                       [ ] High homocysteine in the blood                   (3 Points)                                             [ ] Other congenital or acquired thrombophilia      (3 Points)                                                [ ] Heparin induced thrombocytopenia                  (3 Points)                                        MOBILITY RELATED FACTORS  [ ] Bed rest                                                         (1 Point)  [ ] Plaster cast                                                    (2 points)  [ ] Bed bound for more than 72 hours           (2 Points)    GENDER SPECIFIC FACTORS  [ ] Pregnancy or had a baby within the last month   (1 Point)  [ ] Post-partum < 6 weeks                                   (1 Point)  [ ] Hormonal therapy  or oral contraception   (1 Point)  [ ] History of pregnancy complications              (1 point)  [ ] Unexplained or recurrent              (1 Point)    OTHER RISK FACTORS                                           (1 Point)  [ ] BMI >40, smoking, diabetes requiring insulin, chemotherapy  blood transfusions and length of surgery over 2 hours    SURGERY RELATED RISK FACTORS  [ ]  Section within the last month     (1 Point)  [ ] Minor surgery                                                  (1 Point)  [x ] Arthroscopic surgery                                       (2 Points)  [ ] Planned major surgery lasting more            (2 Points)      than 45 minutes     [ ] Elective hip or knee joint replacement       (5 points)       surgery                                                TRAUMA RELATED RISK FACTORS  [ ] Fracture of the hip, pelvis, or leg                       (5 Points)  [ ] Spinal cord injury resulting in paralysis             (5 points)       (in the previous month)    [ ] Paralysis  (less than 1 month)                             (5 Points)  [ ] Multiple Trauma within 1 month                        (5 Points)    Total Score [    4    ]    Caprini Score 0-2: Low Risk, NO VTE prophylaxis required for most patients, encourage ambulation  Caprini Score 3-6: Moderate Risk , pharmacologic VTE prophylaxis is indicated for most patients (in the absence of contraindications)  Caprini Score Greater than or =7: High risk, pharmocologic VTE prophylaxis indicated for most patients (in the absence of contraindications)

## 2025-07-03 ENCOUNTER — APPOINTMENT (OUTPATIENT)
Dept: CARDIOLOGY | Facility: CLINIC | Age: 55
End: 2025-07-03

## 2025-07-10 ENCOUNTER — TRANSCRIPTION ENCOUNTER (OUTPATIENT)
Age: 55
End: 2025-07-10

## 2025-07-10 ENCOUNTER — OUTPATIENT (OUTPATIENT)
Dept: OUTPATIENT SERVICES | Facility: HOSPITAL | Age: 55
LOS: 1 days | Discharge: HOME | End: 2025-07-10

## 2025-07-10 ENCOUNTER — APPOINTMENT (OUTPATIENT)
Dept: ORTHOPEDIC SURGERY | Facility: HOSPITAL | Age: 55
End: 2025-07-10
Payer: COMMERCIAL

## 2025-07-10 VITALS
DIASTOLIC BLOOD PRESSURE: 69 MMHG | SYSTOLIC BLOOD PRESSURE: 106 MMHG | RESPIRATION RATE: 16 BRPM | HEART RATE: 73 BPM | OXYGEN SATURATION: 98 %

## 2025-07-10 VITALS
OXYGEN SATURATION: 97 % | WEIGHT: 126.1 LBS | DIASTOLIC BLOOD PRESSURE: 80 MMHG | HEIGHT: 64 IN | TEMPERATURE: 98 F | SYSTOLIC BLOOD PRESSURE: 120 MMHG | RESPIRATION RATE: 14 BRPM | HEART RATE: 68 BPM

## 2025-07-10 DIAGNOSIS — Z98.1 ARTHRODESIS STATUS: Chronic | ICD-10-CM

## 2025-07-10 DIAGNOSIS — Z98.49 CATARACT EXTRACTION STATUS, UNSPECIFIED EYE: Chronic | ICD-10-CM

## 2025-07-10 DIAGNOSIS — Z90.49 ACQUIRED ABSENCE OF OTHER SPECIFIED PARTS OF DIGESTIVE TRACT: Chronic | ICD-10-CM

## 2025-07-10 DIAGNOSIS — Z98.890 OTHER SPECIFIED POSTPROCEDURAL STATES: Chronic | ICD-10-CM

## 2025-07-10 DIAGNOSIS — Z96.651 PRESENCE OF RIGHT ARTIFICIAL KNEE JOINT: Chronic | ICD-10-CM

## 2025-07-10 DIAGNOSIS — Z98.51 TUBAL LIGATION STATUS: Chronic | ICD-10-CM

## 2025-07-10 DIAGNOSIS — Z86.018 PERSONAL HISTORY OF OTHER BENIGN NEOPLASM: Chronic | ICD-10-CM

## 2025-07-10 PROCEDURE — 63047 LAM FACETEC & FORAMOT LUMBAR: CPT | Mod: AS

## 2025-07-10 PROCEDURE — 63048 LAM FACETEC &FORAMOT EA ADDL: CPT

## 2025-07-10 PROCEDURE — 64708 REVISE ARM/LEG NERVE: CPT | Mod: AS,59

## 2025-07-10 PROCEDURE — 64708 REVISE ARM/LEG NERVE: CPT | Mod: 59

## 2025-07-10 PROCEDURE — 63047 LAM FACETEC & FORAMOT LUMBAR: CPT

## 2025-07-10 PROCEDURE — 63048 LAM FACETEC &FORAMOT EA ADDL: CPT | Mod: AS

## 2025-07-10 DEVICE — SURGIFLO MATRIX WITH THROMBIN KIT: Type: IMPLANTABLE DEVICE | Site: RIGHT | Status: FUNCTIONAL

## 2025-07-10 RX ORDER — FENTANYL CITRATE-0.9 % NACL/PF 100MCG/2ML
50 SYRINGE (ML) INTRAVENOUS
Refills: 0 | Status: DISCONTINUED | OUTPATIENT
Start: 2025-07-10 | End: 2025-07-10

## 2025-07-10 RX ORDER — HYDROMORPHONE/SOD CHLOR,ISO/PF 2 MG/10 ML
1 SYRINGE (ML) INJECTION
Refills: 0 | Status: DISCONTINUED | OUTPATIENT
Start: 2025-07-10 | End: 2025-07-10

## 2025-07-10 RX ORDER — ONDANSETRON HCL/PF 4 MG/2 ML
1 VIAL (ML) INJECTION
Qty: 28 | Refills: 0
Start: 2025-07-10 | End: 2025-07-16

## 2025-07-10 RX ORDER — ACETAMINOPHEN 500 MG/5ML
1000 LIQUID (ML) ORAL ONCE
Refills: 0 | Status: COMPLETED | OUTPATIENT
Start: 2025-07-10 | End: 2025-07-10

## 2025-07-10 RX ORDER — PRUCALOPRIDE 2 MG/1
1 TABLET ORAL
Refills: 0 | DISCHARGE

## 2025-07-10 RX ORDER — SODIUM CHLORIDE 9 G/1000ML
1000 INJECTION, SOLUTION INTRAVENOUS
Refills: 0 | Status: DISCONTINUED | OUTPATIENT
Start: 2025-07-10 | End: 2025-07-10

## 2025-07-10 RX ADMIN — Medication 1000 MILLIGRAM(S): at 11:30

## 2025-07-10 RX ADMIN — Medication 50 MICROGRAM(S): at 13:13

## 2025-07-10 RX ADMIN — Medication 1 DROP(S): at 14:12

## 2025-07-10 RX ADMIN — SODIUM CHLORIDE 75 MILLILITER(S): 9 INJECTION, SOLUTION INTRAVENOUS at 10:59

## 2025-07-10 RX ADMIN — Medication 50 MICROGRAM(S): at 13:40

## 2025-07-10 RX ADMIN — Medication 400 MILLIGRAM(S): at 11:00

## 2025-07-10 NOTE — ASU PATIENT PROFILE, ADULT - FALL HARM RISK - RISK INTERVENTIONS
Assistance OOB with selected safe patient handling equipment/Assistance with ambulation/Communicate Fall Risk and Risk Factors to all staff, patient, and family/Discuss with provider need for PT consult/Monitor gait and stability/Reinforce activity limits and safety measures with patient and family/Visual Cue: Yellow wristband/Bed in lowest position, wheels locked, appropriate side rails in place/Call bell, personal items and telephone in reach/Instruct patient to call for assistance before getting out of bed or chair/Non-slip footwear when patient is out of bed/[place] to call system/Physically safe environment - no spills, clutter or unnecessary equipment/Purposeful Proactive Rounding/Room/bathroom lighting operational, light cord in reach

## 2025-07-10 NOTE — ASU PREOP CHECKLIST - PATIENT PROBLEMS/NEEDS
"Subjective     Olesya Cintron is a 43 year old female who presents to clinic today for the following health issues:    HPI   Concern - fatigue  Onset: since her lyme disease diagnosis    Description:   Patient c/o fatigue  Worried about blood pressure- was high in the urgent care last month  Had lyme disease this past summer- concerned this has affected her energy level    Intensity: moderate    Progression of Symptoms:  Fatigue improving  A bit    Accompanying Signs & Symptoms:  Also having trouble sleeping    Previous history of similar problem:   yes    Precipitating factors:   Worsened by: high blood pressure?  Lyme disease?    Alleviating factors:  Improved by: starting  Supplements of fish oil and coq10  She reports feeling better lately as she has started taking better care of herself - eating better, more activity.        Medication Followup of flexeril    Taking Medication as prescribed: yes    Side Effects:  None    Medication Helping Symptoms:  Yes    Neck pain - first had an issue one year ago. PT was helpful.    Recently started having pain again.             Reviewed and updated as needed this visit by Provider  Tobacco  Allergies  Meds  Problems  Med Hx  Surg Hx  Fam Hx         Review of Systems   ROS COMP: Constitutional, HEENT, cardiovascular, pulmonary, gi and gu systems are negative, except as otherwise noted.      Objective    /78 (BP Location: Right arm, Cuff Size: Adult Regular)   Pulse 80   Temp 98.3  F (36.8  C) (Tympanic)   Ht 1.702 m (5' 7\")   Wt 68.9 kg (152 lb)   SpO2 98%   BMI 23.81 kg/m    Body mass index is 23.81 kg/m .  Physical Exam   GENERAL: healthy, alert and no distress  HENT: ear canals and TM's normal, nose and mouth without ulcers or lesions  NECK: no adenopathy, no asymmetry, masses, or scars and thyroid normal to palpation  RESP: lungs clear to auscultation - no rales, rhonchi or wheezes  CV: regular rate and rhythm, normal S1 S2, no S3 or S4, no murmur, " click or rub, no peripheral edema and peripheral pulses strong  ABDOMEN: soft, nontender, no hepatosplenomegaly, no masses and bowel sounds normal  MS: no gross musculoskeletal defects noted, no edema            Assessment & Plan       ICD-10-CM    1. Neck pain M54.2 Recurrent episode.  Advised to restart PT exercises.  Heat to area for 15 minutes several times a day.  Ibuprofen 600 mg every 6 hours as needed for pain.  May also use Flexeril 10 mg every 8 hours as needed for muscle tightness or muscle spasms.  If no improvement in 1 month follow-up in clinic.  cyclobenzaprine (FLEXERIL) 10 MG tablet     2. H/o Lyme disease Z86.19 Patient continues to have fatigue following Lyme disease diagnosis several months ago.  She reports feeling better when she makes an effort to take care of herself.  Encouraged her to resume healthy eating and to begin exercising again.     3. Elevated BP without diagnosis of hypertension R03.0 Patient reports an elevated blood pressure reading in urgent care last month.  She also reports intermittent high blood pressure readings when she has her Adderall refilled.  Her blood pressure is normal today.  Encourage patient to start home monitoring of her blood pressure and to keep a written log.  If blood pressures are consistently greater than 130/90, she should follow-up in clinic to discuss starting antihypertensive medications.  Handouts given on DASH diet and Mediterranean diet.              Return in about 4 weeks (around 12/6/2019), or if symptoms worsen or fail to improve.    The risks, benefits and treatment options of prescribed medications or other treatments have been discussed with the patient. The patient verbalized their understanding and should call or follow up if no improvement or if they develop further problems.    FANI Frausto [facility]       Patient expressed no known problems or needs

## 2025-07-10 NOTE — PROGRESS NOTE ADULT - SUBJECTIVE AND OBJECTIVE BOX
Called to PACU to evaluate patient who is c/o left eye pain.  Described as a scratchy feeling. Denies any double vision, blurry vision or visual changes.  O/E eye shows mild erythema but no trauma. Attempts to flush the eye with normal saline ineffective.  Of note, patient was witnessed rubbing the left eye quite aggressively in PACU.  Patient treated with Tetracaine Ophth solution with immediate relief.  Counseled patient that it is likely a corneal abrasion and reassured.  Prescribed Tobramycin opthalmic drops and told that condition should improve with treatment.  If condition remains or worsens she was instructed to see Ophthalmologist.  All questions answered.

## 2025-07-10 NOTE — ASU PREOP CHECKLIST - TEMPERATURE IN FAHRENHEIT (DEGREES F)
"Outpatient Psychiatry Follow-Up Visit (MD/NP)    10/2/2024    Clinical Status of Patient:  Outpatient (Ambulatory)    Chief Complaint:  Naomy Armstrong is a 59 y.o. female who presents today for follow-up of depression and anxiety.  Met with patient.      Interval History and Content of Current Session:  Interim Events/Subjective Report/Content of Current Session:     Patient Naomy Armstrong presents to clinic for follow up. Last seen 4/24. Was on prozac and lurasidone. She says life has been "stressful." She says "physically I'm falling apart"- is going to be having surgery. Because of her health problems she felt like she was falling behind at work. Ex boyfriend is still in her house, and she is getting him out of the house this month. She is planning to sell the house. Mood is "ok" despite all these stressors. Her new boyfriend is very supportive.   She dislikes her supervisor at work and that makes it more stressful. She does generally like her job.   Sleep is "ok"- GP increased her pregabalin and that makes her drowsy, sometimes groggy. It makes her feel quite drowsy, so she is trying to take her medicine earlier in the day. Depression and anxiety have improved, and she is feeling better despite her stress. She is also on a pain medicine for her back.     No abnormal movements in face or mouth. Had one fall when tripped on tree roots. No thoughts of suicide or self harm.     Surgery is scheduled - October 28th.     PSYCHOTHERAPY ADD-ON +[id number] 30 minutes (range 16-37 minutes)  Therapeutic intervention type: supportive psychotherapy  Why chosen therapy is appropriate versus another modality: relevant to diagnosis  Target symptoms addressed: depression, anxiety , work stress  Topics and themes discussed: relationships difficulties, work stress  Primary focus: relationship changes- dealing with ex moving out, boundaries,  health issues  Psychotherapeutic techniques employed: active listening and " empathic responses  Outcome monitoring methods: self-report  The patient's response to the intervention is: accepting.   The patient's progress toward treatment goals is: fair.  Duration of intervention: 17 minutes    No SI, HI, AVH.      Review of Systems   PSYCHIATRIC: Pertinant items are noted in the narrative.  CONSTITUTIONAL: No weight gain or loss.   MUSCULOSKELETAL: No pain or stiffness of the joints.  NEUROLOGIC: No weakness, sensory changes, seizures, confusion, memory loss, tremor or other abnormal movements.  RESPIRATORY: No shortness of breath.  CARDIOVASCULAR: No tachycardia or chest pain.  GASTROINTESTINAL: No nausea, vomiting, pain, constipation or diarrhea.    Past Medical, Family and Social History: The patient's past medical, family and social history have been reviewed and updated as appropriate within the electronic medical record - see encounter notes.    Compliance: yes    Side effects: None    Risk Parameters:  Patient reports no suicidal ideation  Patient reports no homicidal ideation  Patient reports no self-injurious behavior  Patient reports no violent behavior    Exam (detailed: at least 9 elements; comprehensive: all 15 elements)   Constitutional  Vitals:  Most recent vital signs, dated less than 90 days prior to this appointment, were reviewed.   There were no vitals filed for this visit.       General:  unremarkable, age appropriate     Musculoskeletal  Muscle Strength/Tone:  no tremor, no tic   Gait & Station:  non-ataxic     Psychiatric  Speech:  no latency; no press   Mood & Affect:  Much better- euthymic, bright   congruent and appropriate   Thought Process:  normal and logical   Associations:  intact   Thought Content:  normal, no suicidality, no homicidality, delusions, or paranoia   Insight:  Insight is good    Judgement: good   Orientation:  person, place, situation, time/date   Memory: able to remember recent events- yes, able to remember remote events- yes   Language: able to  name, able to repeat   Attention Span & Concentration:  able to focus   Fund of Knowledge:  intact and appropriate to age and level of education     Assessment and Diagnosis   Status/Progress: Based on the examination today, the patient's problem(s) is/are adequately but not ideally controlled.  New problems have been presented today.   Co-morbidities are complicating management of the primary condition.  There are no active rule-out diagnoses for this patient at this time.     General Impression: We will continue pharmacological intervention and adjunctive therapy.   Major depressive disorder, recurrent, in partial remission  Generalized anxiety disorder  Psychosocial distress     Intervention/Counseling/Treatment Plan   Medication Management: Continue current medications. The risks and benefits of medication were discussed with the patient.  Counseling provided with patient as follows: importance of compliance with chosen treatment options was emphasized, risks and benefits of treatment options, including medications, were discussed with the patient, risk factor reduction, prognosis, patient education, instructions for  management, treatment and follow-up were reviewed  1.  Continue Prozac to 80 mg (2x40 mg) daily targeting depression and anxiety.  Warned of risk of abla, suicidality, serotonin syndrome.  2. Continue lurasidone 20 mg daily. Discussed taking with food. Discussed FDA approval for bipolar depression rather than unipolar MDD. Discussed r/b/SE including dysmetabolia/need for labwork, TD, NMS, dystonia, etc.  Will put in labwork today.  3.  Continue rare use of hydroxyzine 10 mg p.o. b.i.d. p.r.n. anxiety or insomnia.  Warned of risk of over-sedation.  4.  May have to consider monitoring for memory changes, given significant genetic history.   5. Reviewed ED precautions. No SI, HI, AVH.           Return to Clinic: 3 months or sooner PRN           97.6

## 2025-07-10 NOTE — ASU DISCHARGE PLAN (ADULT/PEDIATRIC) - FINANCIAL ASSISTANCE
[facility] provides services at a reduced cost to those who are determined to be eligible through [facility]’s financial assistance program. Information regarding [facility]’s financial assistance program can be found by going to [email or web address] or by calling [phone].

## 2025-07-10 NOTE — ASU PATIENT PROFILE, ADULT - NSICDXPASTMEDICALHX_GEN_ALL_CORE_FT
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

## 2025-07-12 DIAGNOSIS — J30.2 OTHER SEASONAL ALLERGIC RHINITIS: ICD-10-CM

## 2025-07-12 DIAGNOSIS — K58.9 IRRITABLE BOWEL SYNDROME, UNSPECIFIED: ICD-10-CM

## 2025-07-12 DIAGNOSIS — E78.5 HYPERLIPIDEMIA, UNSPECIFIED: ICD-10-CM

## 2025-07-12 DIAGNOSIS — F41.9 ANXIETY DISORDER, UNSPECIFIED: ICD-10-CM

## 2025-07-12 DIAGNOSIS — E03.9 HYPOTHYROIDISM, UNSPECIFIED: ICD-10-CM

## 2025-07-12 DIAGNOSIS — M48.061 SPINAL STENOSIS, LUMBAR REGION WITHOUT NEUROGENIC CLAUDICATION: ICD-10-CM

## 2025-07-12 DIAGNOSIS — Z96.651 PRESENCE OF RIGHT ARTIFICIAL KNEE JOINT: ICD-10-CM

## 2025-07-12 DIAGNOSIS — I47.10 SUPRAVENTRICULAR TACHYCARDIA, UNSPECIFIED: ICD-10-CM

## 2025-07-12 DIAGNOSIS — Z88.0 ALLERGY STATUS TO PENICILLIN: ICD-10-CM

## 2025-07-16 PROBLEM — T70.3XXA: Chronic | Status: ACTIVE | Noted: 2025-07-10

## 2025-07-16 RX ORDER — OXYCODONE AND ACETAMINOPHEN 5; 325 MG/1; MG/1
5-325 TABLET ORAL
Qty: 60 | Refills: 0 | Status: ACTIVE | COMMUNITY
Start: 2025-07-10 | End: 1900-01-01

## 2025-07-17 ENCOUNTER — APPOINTMENT (OUTPATIENT)
Dept: CARDIOLOGY | Facility: CLINIC | Age: 55
End: 2025-07-17
Payer: COMMERCIAL

## 2025-07-17 PROCEDURE — G2211 COMPLEX E/M VISIT ADD ON: CPT | Mod: NC,95

## 2025-07-17 PROCEDURE — 99214 OFFICE O/P EST MOD 30 MIN: CPT | Mod: 95

## 2025-07-21 ENCOUNTER — RX RENEWAL (OUTPATIENT)
Age: 55
End: 2025-07-21

## 2025-07-23 ENCOUNTER — APPOINTMENT (OUTPATIENT)
Dept: ORTHOPEDIC SURGERY | Facility: CLINIC | Age: 55
End: 2025-07-23
Payer: COMMERCIAL

## 2025-07-23 DIAGNOSIS — M54.16 RADICULOPATHY, LUMBAR REGION: ICD-10-CM

## 2025-07-23 PROCEDURE — 99024 POSTOP FOLLOW-UP VISIT: CPT

## 2025-07-23 PROCEDURE — 72170 X-RAY EXAM OF PELVIS: CPT

## 2025-07-23 PROCEDURE — 72100 X-RAY EXAM L-S SPINE 2/3 VWS: CPT

## 2025-08-04 ENCOUNTER — APPOINTMENT (OUTPATIENT)
Dept: ORTHOPEDIC SURGERY | Facility: CLINIC | Age: 55
End: 2025-08-04
Payer: COMMERCIAL

## 2025-08-04 DIAGNOSIS — M54.16 RADICULOPATHY, LUMBAR REGION: ICD-10-CM

## 2025-08-04 PROCEDURE — 10140 I&D HMTMA SEROMA/FLUID COLLJ: CPT

## 2025-08-04 PROCEDURE — 99024 POSTOP FOLLOW-UP VISIT: CPT

## 2025-08-04 PROCEDURE — 72100 X-RAY EXAM L-S SPINE 2/3 VWS: CPT

## 2025-08-06 ENCOUNTER — APPOINTMENT (OUTPATIENT)
Dept: CARDIOLOGY | Facility: CLINIC | Age: 55
End: 2025-08-06
Payer: COMMERCIAL

## 2025-08-06 VITALS
DIASTOLIC BLOOD PRESSURE: 72 MMHG | WEIGHT: 132 LBS | OXYGEN SATURATION: 99 % | HEART RATE: 84 BPM | SYSTOLIC BLOOD PRESSURE: 130 MMHG | BODY MASS INDEX: 22.66 KG/M2

## 2025-08-06 DIAGNOSIS — E78.5 HYPERLIPIDEMIA, UNSPECIFIED: ICD-10-CM

## 2025-08-06 DIAGNOSIS — R00.2 PALPITATIONS: ICD-10-CM

## 2025-08-06 PROCEDURE — G2211 COMPLEX E/M VISIT ADD ON: CPT | Mod: NC

## 2025-08-06 PROCEDURE — 99214 OFFICE O/P EST MOD 30 MIN: CPT

## 2025-08-06 RX ORDER — TIRZEPATIDE 7.5 MG/.5ML
7.5 INJECTION, SOLUTION SUBCUTANEOUS
Qty: 1 | Refills: 1 | Status: ACTIVE | COMMUNITY
Start: 2025-08-06 | End: 1900-01-01

## 2025-08-07 LAB
ALBUMIN SERPL ELPH-MCNC: 4.4 G/DL
ALP BLD-CCNC: 68 U/L
ALT SERPL-CCNC: 28 U/L
ANION GAP SERPL CALC-SCNC: 13 MMOL/L
AST SERPL-CCNC: 27 U/L
BILIRUB SERPL-MCNC: 0.7 MG/DL
BUN SERPL-MCNC: 10 MG/DL
CALCIUM SERPL-MCNC: 9.4 MG/DL
CHLORIDE SERPL-SCNC: 104 MMOL/L
CHOLEST SERPL-MCNC: 262 MG/DL
CO2 SERPL-SCNC: 25 MMOL/L
CREAT SERPL-MCNC: 0.64 MG/DL
EGFRCR SERPLBLD CKD-EPI 2021: 103 ML/MIN/1.73M2
ESTIMATED AVERAGE GLUCOSE: 97 MG/DL
GLUCOSE SERPL-MCNC: 78 MG/DL
HBA1C MFR BLD HPLC: 5 %
HDLC SERPL-MCNC: 81 MG/DL
LDLC SERPL-MCNC: 168 MG/DL
NONHDLC SERPL-MCNC: 181 MG/DL
POTASSIUM SERPL-SCNC: 4.3 MMOL/L
PROT SERPL-MCNC: 7.1 G/DL
SODIUM SERPL-SCNC: 141 MMOL/L
TRIGL SERPL-MCNC: 79 MG/DL

## 2025-08-11 ENCOUNTER — NON-APPOINTMENT (OUTPATIENT)
Age: 55
End: 2025-08-11

## 2025-08-11 ENCOUNTER — APPOINTMENT (OUTPATIENT)
Dept: GASTROENTEROLOGY | Facility: CLINIC | Age: 55
End: 2025-08-11
Payer: COMMERCIAL

## 2025-08-11 VITALS
HEART RATE: 79 BPM | WEIGHT: 135 LBS | SYSTOLIC BLOOD PRESSURE: 115 MMHG | OXYGEN SATURATION: 98 % | DIASTOLIC BLOOD PRESSURE: 70 MMHG | BODY MASS INDEX: 23.05 KG/M2 | HEIGHT: 64 IN

## 2025-08-11 DIAGNOSIS — K59.00 CONSTIPATION, UNSPECIFIED: ICD-10-CM

## 2025-08-11 PROCEDURE — G2211 COMPLEX E/M VISIT ADD ON: CPT | Mod: NC

## 2025-08-11 PROCEDURE — 99214 OFFICE O/P EST MOD 30 MIN: CPT

## 2025-08-12 ENCOUNTER — OUTPATIENT (OUTPATIENT)
Dept: OUTPATIENT SERVICES | Facility: HOSPITAL | Age: 55
LOS: 1 days | End: 2025-08-12

## 2025-08-12 ENCOUNTER — APPOINTMENT (OUTPATIENT)
Dept: INTERNAL MEDICINE | Facility: CLINIC | Age: 55
End: 2025-08-12

## 2025-08-12 DIAGNOSIS — Z98.1 ARTHRODESIS STATUS: Chronic | ICD-10-CM

## 2025-08-12 DIAGNOSIS — Z96.651 PRESENCE OF RIGHT ARTIFICIAL KNEE JOINT: Chronic | ICD-10-CM

## 2025-08-12 DIAGNOSIS — Z98.51 TUBAL LIGATION STATUS: Chronic | ICD-10-CM

## 2025-08-12 DIAGNOSIS — Z98.890 OTHER SPECIFIED POSTPROCEDURAL STATES: Chronic | ICD-10-CM

## 2025-08-12 DIAGNOSIS — Z90.49 ACQUIRED ABSENCE OF OTHER SPECIFIED PARTS OF DIGESTIVE TRACT: Chronic | ICD-10-CM

## 2025-08-12 DIAGNOSIS — Z98.49 CATARACT EXTRACTION STATUS, UNSPECIFIED EYE: Chronic | ICD-10-CM

## 2025-08-12 DIAGNOSIS — Z86.018 PERSONAL HISTORY OF OTHER BENIGN NEOPLASM: Chronic | ICD-10-CM

## 2025-08-12 RX ORDER — EVOLOCUMAB 140 MG/ML
140 INJECTION, SOLUTION SUBCUTANEOUS
Qty: 1 | Refills: 5 | Status: ACTIVE | COMMUNITY
Start: 2025-08-12 | End: 1900-01-01

## 2025-08-18 ENCOUNTER — APPOINTMENT (OUTPATIENT)
Dept: ORTHOPEDIC SURGERY | Facility: CLINIC | Age: 55
End: 2025-08-18
Payer: COMMERCIAL

## 2025-08-18 DIAGNOSIS — M51.26 OTHER INTERVERTEBRAL DISC DISPLACEMENT, LUMBAR REGION: ICD-10-CM

## 2025-08-18 PROCEDURE — 99024 POSTOP FOLLOW-UP VISIT: CPT

## 2025-08-18 PROCEDURE — 72100 X-RAY EXAM L-S SPINE 2/3 VWS: CPT

## 2025-09-08 ENCOUNTER — APPOINTMENT (OUTPATIENT)
Dept: CARDIOLOGY | Facility: CLINIC | Age: 55
End: 2025-09-08
Payer: COMMERCIAL

## 2025-09-08 DIAGNOSIS — E78.5 HYPERLIPIDEMIA, UNSPECIFIED: ICD-10-CM

## 2025-09-08 DIAGNOSIS — R00.2 PALPITATIONS: ICD-10-CM

## 2025-09-08 DIAGNOSIS — E66.3 OVERWEIGHT: ICD-10-CM

## 2025-09-08 PROCEDURE — G2211 COMPLEX E/M VISIT ADD ON: CPT | Mod: NC,95

## 2025-09-08 PROCEDURE — 99214 OFFICE O/P EST MOD 30 MIN: CPT | Mod: 95

## 2025-09-08 RX ORDER — TIRZEPATIDE 10 MG/.5ML
10 INJECTION, SOLUTION SUBCUTANEOUS
Qty: 1 | Refills: 1 | Status: ACTIVE | COMMUNITY
Start: 2025-09-08 | End: 1900-01-01

## 2025-09-15 ENCOUNTER — APPOINTMENT (OUTPATIENT)
Dept: ORTHOPEDIC SURGERY | Facility: CLINIC | Age: 55
End: 2025-09-15

## (undated) DEVICE — GOWN LG

## (undated) DEVICE — DENTURE CUP PINK

## (undated) DEVICE — DRAPE IOBAN 33" X 23"

## (undated) DEVICE — GLV 7 PROTEXIS (WHITE)

## (undated) DEVICE — BIPOLAR FORCEP HENSLER BAYONET 8" X 1MM (YELLOW)

## (undated) DEVICE — CONTAINER FORMALIN 80ML YELLOW

## (undated) DEVICE — NDL SPINAL 18G X 3.5" (PINK)

## (undated) DEVICE — SOL IRR BAG NS 0.9% 3000ML

## (undated) DEVICE — STRYKER MIXEVAC 3 BONE CEMENT MIXER

## (undated) DEVICE — TUBING IV SET GRAVITY 3Y 100" MACRO

## (undated) DEVICE — SUT VICRYL 2-0 18" CP-2 UNDYED (POP-OFF)

## (undated) DEVICE — DRAPE 1/2 SHEET 40X57"

## (undated) DEVICE — ELCTR BOVIE TIP BLADE INSULATED 2.75" EDGE WITH SAFETY

## (undated) DEVICE — WARMING BLANKET UPPER ADULT

## (undated) DEVICE — BASIN EMESIS 10IN GRADUATED MAUVE

## (undated) DEVICE — DRSG STERISTRIPS 0.5 X 4"

## (undated) DEVICE — MIDAS REX LEGEND MATCH HEAD FLUTED LG BORE 3.0MM X 14CM

## (undated) DEVICE — LINE BREATHE SAMPLNG

## (undated) DEVICE — PACK IV START WITH CHG

## (undated) DEVICE — UNDERPAD LINEN SAVER 17 X 24"

## (undated) DEVICE — Device

## (undated) DEVICE — ELCTR GROUNDING PAD ADULT COVIDIEN

## (undated) DEVICE — SAW BLADE STRYKER SAGITTAL 25X98.5X1.24MM

## (undated) DEVICE — GOWN IMPERV BREATHABLE XL

## (undated) DEVICE — CLAMP BX HOT RAD JAW 3

## (undated) DEVICE — SALIVA EJECTOR (BLUE)

## (undated) DEVICE — SYR SLIP 10CC

## (undated) DEVICE — EYE SHIELD

## (undated) DEVICE — WARMING BLANKET LOWER ADULT

## (undated) DEVICE — FRA-ESU BOVIE FORCE TRIAD T7J19745DX: Type: DURABLE MEDICAL EQUIPMENT

## (undated) DEVICE — VENODYNE/SCD SLEEVE CALF MEDIUM

## (undated) DEVICE — DRAPE C ARM 41X140"

## (undated) DEVICE — SUT PROLENE 4-0 18" PS-2

## (undated) DEVICE — BIPOLAR FORCEP HENSLER BAYONET 10" X 1MM (YELLOW)

## (undated) DEVICE — PACK BASIC

## (undated) DEVICE — SUT PROLENE 3-0 36" SH

## (undated) DEVICE — BIOPSY FORCEP RADIAL JAW 4 STANDARD WITH NEEDLE

## (undated) DEVICE — DRSG 2X2

## (undated) DEVICE — GLV 7 PROTEXIS (BLUE)

## (undated) DEVICE — ZIMMER PULSAVAC PLUS FAN KIT

## (undated) DEVICE — DRAPE SURGICAL #1010

## (undated) DEVICE — TUBING MEDI-VAC W MAXIGRIP CONNECTORS 1/4"X6'

## (undated) DEVICE — SAW BLADE STRYKER SAGITTAL 3 HOLE OSCILLATING

## (undated) DEVICE — SUT VICRYL 0 18" CT-1 UNDYED (POP-OFF)

## (undated) DEVICE — GLV 8 PROTEXIS (BLUE)

## (undated) DEVICE — DRSG BANDAID 0.75X3"

## (undated) DEVICE — SUT VICRYL PLUS 0 18" CT-1 UNDYED (POP-OFF)

## (undated) DEVICE — SUCTION YANKAUER TAPERED BULBOUS NO VENT

## (undated) DEVICE — ELCTR ECG CONDUCTIVE ADHESIVE

## (undated) DEVICE — CATH IV SAFE BC 22G X 1" (BLUE)

## (undated) DEVICE — STAPLER SKIN MULTI DIRECTION W35

## (undated) DEVICE — BIOPSY FORCEP COLD DISP

## (undated) DEVICE — MIDAS REX LEGEND LUBRICANT DIFFUSER CARTRIDGE

## (undated) DEVICE — MISONIX BONESCALPEL BLUNT BLADE & TUBESET 20MM

## (undated) DEVICE — SOL IRR POUR NS 0.9% 1000ML

## (undated) DEVICE — DRAPE INSTRUMENT POUCH 6.75" X 11"

## (undated) DEVICE — DRSG CURITY GAUZE SPONGE 4 X 4" 12-PLY

## (undated) DEVICE — GLV 7.5 PROTEXIS (BLUE)

## (undated) DEVICE — PACK CERVICAL FUSION

## (undated) DEVICE — POSITIONER STRAP ARMBOARD VELCRO TS-30

## (undated) DEVICE — DRSG XEROFORM 5 X 9"

## (undated) DEVICE — SYR LUER LOK 20CC

## (undated) DEVICE — DRSG PICO NPWT 4X12"

## (undated) DEVICE — NDL HYPO SAFE 20G X 1.5" (YELLOW)

## (undated) DEVICE — SUT ETHILON 2-0 18" FS

## (undated) DEVICE — PREP DURAPREP 6CC

## (undated) DEVICE — SUT VICRYL PLUS 1 18" CT-1 UNDYED (POP-OFF)

## (undated) DEVICE — DRSG STOCKINETTE TUBULAR COTTON 2PLY 6X72"

## (undated) DEVICE — SOL INJ LR 1000ML

## (undated) DEVICE — MAKO VIZADISC KNEE TRACKING KIT

## (undated) DEVICE — SUT SILK 2-0 12-18"

## (undated) DEVICE — DRAPE SHOWER CURTAIN ISOLATION

## (undated) DEVICE — DRAPE 3/4 SHEET 52X76"

## (undated) DEVICE — PACK TOTAL KNEE

## (undated) DEVICE — SOL ANTI FOG

## (undated) DEVICE — BITE BLOCK ADULT 20 X 27MM (GREEN)

## (undated) DEVICE — FACESHIELD FULL VISOR

## (undated) DEVICE — TUBING SUCTION NONCONDUCTIVE 6MM X 12FT

## (undated) DEVICE — WOUND IRR IRRISEPT W 0.5 CHG

## (undated) DEVICE — SUT VICRYL PLUS 2-0 18" CP-2 UNDYED (POP-OFF)

## (undated) DEVICE — SPONGE PEANUT AUTO COUNT

## (undated) DEVICE — MAKO BLADE STANDARD

## (undated) DEVICE — LUBRICATING JELLY HR ONE SHOT 3G

## (undated) DEVICE — DRSG CURITY GAUZE SPONGE 4 X 4" 12-PLY NON-STERILE

## (undated) DEVICE — DRSG WEBRIL 6"

## (undated) DEVICE — MAKO BLADE NARROW

## (undated) DEVICE — TAPE SILK 3"

## (undated) DEVICE — CRYO/CUFF GRAVITY COOLER KNEE LARGE

## (undated) DEVICE — ELCTR STRYKER NEPTUNE SMOKE EVACUATION PENCIL (GREEN)

## (undated) DEVICE — VALVE BIOPSY BRONCHOVIDEOSCOPE

## (undated) DEVICE — DRSG TEGADERM 4X4.75"

## (undated) DEVICE — SUT VLOC 90 4-0 18" P-12 UNDYED

## (undated) DEVICE — SUT VICRYL 2-0 36" CT-1 UNDYED

## (undated) DEVICE — GLV 8 PROTEXIS (WHITE)

## (undated) DEVICE — PACK SPINE

## (undated) DEVICE — MAKO DRAPE KIT

## (undated) DEVICE — DRAIN JACKSON PRATT 7MM FLAT FULL NO TROCAR

## (undated) DEVICE — DRSG STOCKINETTE TUBULAR COTTON 1PLY 6X72"

## (undated) DEVICE — SUT VICRYL 1 36" CTX UNDYED

## (undated) DEVICE — DRILL BIT NUVASIVE HELIX 2.5X10MM

## (undated) DEVICE — MAKO CHECKPOINT KIT FEMORAL / TIBIAL

## (undated) DEVICE — HOOD T5 PEELAWAY

## (undated) DEVICE — PREP CHLORAPREP HI-LITE ORANGE 26ML

## (undated) DEVICE — CERVICAL COLLAR ZIMMER PHILA MED

## (undated) DEVICE — VALVE SUCTION EVIS 160/200/240

## (undated) DEVICE — TAPE SILK 2"

## (undated) DEVICE — DRAPE STERI-DRAPE INCISE 32X33"

## (undated) DEVICE — TUBING BIPOLAR IRRIGATOR AND CORD SET

## (undated) DEVICE — FRA-ESU BOVIE FORCE TRIAD T7J19717DX: Type: DURABLE MEDICAL EQUIPMENT

## (undated) DEVICE — HOOD FLYTE STRYKER HELMET SHIELD

## (undated) DEVICE — BLADE SCALPEL SAFETYLOCK #15